# Patient Record
Sex: MALE | Race: WHITE | NOT HISPANIC OR LATINO | Employment: FULL TIME | URBAN - METROPOLITAN AREA
[De-identification: names, ages, dates, MRNs, and addresses within clinical notes are randomized per-mention and may not be internally consistent; named-entity substitution may affect disease eponyms.]

---

## 2021-03-20 PROBLEM — K70.11 ALCOHOLIC HEPATITIS WITH ASCITES: Status: ACTIVE | Noted: 2021-03-20

## 2021-03-20 PROBLEM — K76.6 PORTAL VENOUS HYPERTENSION (HCC): Status: ACTIVE | Noted: 2021-03-20

## 2021-06-24 PROBLEM — E87.1 HYPONATREMIA: Status: ACTIVE | Noted: 2021-06-01

## 2023-07-12 ENCOUNTER — APPOINTMENT (OUTPATIENT)
Dept: RADIOLOGY | Facility: MEDICAL CENTER | Age: 33
DRG: 854 | End: 2023-07-12
Attending: EMERGENCY MEDICINE

## 2023-07-12 ENCOUNTER — ANESTHESIA EVENT (OUTPATIENT)
Dept: SURGERY | Facility: MEDICAL CENTER | Age: 33
DRG: 854 | End: 2023-07-12

## 2023-07-12 ENCOUNTER — HOSPITAL ENCOUNTER (INPATIENT)
Facility: MEDICAL CENTER | Age: 33
LOS: 7 days | DRG: 854 | End: 2023-07-19
Attending: EMERGENCY MEDICINE | Admitting: HOSPITALIST
Payer: SELF-PAY

## 2023-07-12 ENCOUNTER — ANESTHESIA (OUTPATIENT)
Dept: SURGERY | Facility: MEDICAL CENTER | Age: 33
DRG: 854 | End: 2023-07-12

## 2023-07-12 DIAGNOSIS — R78.81 BACTEREMIA: ICD-10-CM

## 2023-07-12 DIAGNOSIS — E11.69 CONTROLLED TYPE 2 DIABETES MELLITUS WITH OTHER SPECIFIED COMPLICATION, UNSPECIFIED WHETHER LONG TERM INSULIN USE (HCC): ICD-10-CM

## 2023-07-12 DIAGNOSIS — L02.612 ABSCESS OF LEFT FOOT: ICD-10-CM

## 2023-07-12 PROBLEM — E11.9 DIABETES MELLITUS TYPE II, CONTROLLED (HCC): Status: ACTIVE | Noted: 2023-07-12

## 2023-07-12 PROBLEM — D64.9 NORMOCHROMIC NORMOCYTIC ANEMIA: Status: ACTIVE | Noted: 2023-07-12

## 2023-07-12 PROBLEM — E87.20 METABOLIC ACIDOSIS: Status: ACTIVE | Noted: 2023-07-12

## 2023-07-12 LAB
ALBUMIN SERPL BCP-MCNC: 4 G/DL (ref 3.2–4.9)
ALBUMIN/GLOB SERPL: 1.3 G/DL
ALP SERPL-CCNC: 197 U/L (ref 30–99)
ALT SERPL-CCNC: 14 U/L (ref 2–50)
ANION GAP SERPL CALC-SCNC: 18 MMOL/L (ref 7–16)
APPEARANCE UR: ABNORMAL
AST SERPL-CCNC: 18 U/L (ref 12–45)
BACTERIA #/AREA URNS HPF: ABNORMAL /HPF
BASOPHILS # BLD AUTO: 0.3 % (ref 0–1.8)
BASOPHILS # BLD: 0.03 K/UL (ref 0–0.12)
BILIRUB SERPL-MCNC: 2 MG/DL (ref 0.1–1.5)
BILIRUB UR QL STRIP.AUTO: ABNORMAL
BUN SERPL-MCNC: 10 MG/DL (ref 8–22)
CALCIUM ALBUM COR SERPL-MCNC: 8.8 MG/DL (ref 8.5–10.5)
CALCIUM SERPL-MCNC: 8.8 MG/DL (ref 8.4–10.2)
CHLORIDE SERPL-SCNC: 84 MMOL/L (ref 96–112)
CO2 SERPL-SCNC: 21 MMOL/L (ref 20–33)
COLOR UR: YELLOW
CREAT SERPL-MCNC: 0.94 MG/DL (ref 0.5–1.4)
EOSINOPHIL # BLD AUTO: 0 K/UL (ref 0–0.51)
EOSINOPHIL NFR BLD: 0 % (ref 0–6.9)
EPI CELLS #/AREA URNS HPF: ABNORMAL /HPF
ERYTHROCYTE [DISTWIDTH] IN BLOOD BY AUTOMATED COUNT: 38.4 FL (ref 35.9–50)
ERYTHROCYTE [SEDIMENTATION RATE] IN BLOOD BY WESTERGREN METHOD: 30 MM/HOUR (ref 0–20)
GFR SERPLBLD CREATININE-BSD FMLA CKD-EPI: 110 ML/MIN/1.73 M 2
GLOBULIN SER CALC-MCNC: 3 G/DL (ref 1.9–3.5)
GLUCOSE BLD STRIP.AUTO-MCNC: 188 MG/DL (ref 65–99)
GLUCOSE SERPL-MCNC: 209 MG/DL (ref 65–99)
GLUCOSE UR STRIP.AUTO-MCNC: 100 MG/DL
HCT VFR BLD AUTO: 39 % (ref 42–52)
HGB BLD-MCNC: 13.4 G/DL (ref 14–18)
HGB RETIC QN AUTO: 27.9 PG/CELL (ref 29–35)
IMM GRANULOCYTES # BLD AUTO: 0.07 K/UL (ref 0–0.11)
IMM GRANULOCYTES NFR BLD AUTO: 0.6 % (ref 0–0.9)
IMM RETICS NFR: 6.9 % (ref 2.6–16.1)
INR PPP: 1.48 (ref 0.87–1.13)
IRON SATN MFR SERPL: 5 % (ref 15–55)
IRON SERPL-MCNC: 12 UG/DL (ref 50–180)
KETONES UR STRIP.AUTO-MCNC: NEGATIVE MG/DL
LACTATE SERPL-SCNC: 1.6 MMOL/L (ref 0.5–2)
LACTATE SERPL-SCNC: 2.3 MMOL/L (ref 0.5–2)
LEUKOCYTE ESTERASE UR QL STRIP.AUTO: NEGATIVE
LYMPHOCYTES # BLD AUTO: 0.33 K/UL (ref 1–4.8)
LYMPHOCYTES NFR BLD: 3.1 % (ref 22–41)
MCH RBC QN AUTO: 28.6 PG (ref 27–33)
MCHC RBC AUTO-ENTMCNC: 34.4 G/DL (ref 32.3–36.5)
MCV RBC AUTO: 83.3 FL (ref 81.4–97.8)
MICRO URNS: ABNORMAL
MONOCYTES # BLD AUTO: 0.65 K/UL (ref 0–0.85)
MONOCYTES NFR BLD AUTO: 6 % (ref 0–13.4)
MUCOUS THREADS #/AREA URNS HPF: ABNORMAL /HPF
NEUTROPHILS # BLD AUTO: 9.73 K/UL (ref 1.82–7.42)
NEUTROPHILS NFR BLD: 90 % (ref 44–72)
NITRITE UR QL STRIP.AUTO: POSITIVE
NRBC # BLD AUTO: 0 K/UL
NRBC BLD-RTO: 0 /100 WBC (ref 0–0.2)
PH UR STRIP.AUTO: 5.5 [PH] (ref 5–8)
PLATELET # BLD AUTO: 94 K/UL (ref 164–446)
PLATELET BLD QL SMEAR: NORMAL
PMV BLD AUTO: 11.9 FL (ref 9–12.9)
POTASSIUM SERPL-SCNC: 3.9 MMOL/L (ref 3.6–5.5)
PROCALCITONIN SERPL-MCNC: 0.76 NG/ML
PROCALCITONIN SERPL-MCNC: 0.77 NG/ML
PROT SERPL-MCNC: 7 G/DL (ref 6–8.2)
PROT UR QL STRIP: 100 MG/DL
PROTHROMBIN TIME: 18.5 SEC (ref 12–14.6)
RBC # BLD AUTO: 4.68 M/UL (ref 4.7–6.1)
RBC # URNS HPF: ABNORMAL /HPF
RBC BLD AUTO: NORMAL
RBC UR QL AUTO: ABNORMAL
RETICS # AUTO: 0.06 M/UL (ref 0.04–0.12)
RETICS/RBC NFR: 1.4 % (ref 0.8–2.6)
SODIUM SERPL-SCNC: 123 MMOL/L (ref 135–145)
SP GR UR STRIP.AUTO: 1.02
TIBC SERPL-MCNC: 250 UG/DL (ref 250–450)
TROPONIN T SERPL-MCNC: 19 NG/L (ref 6–19)
UIBC SERPL-MCNC: 238 UG/DL (ref 110–370)
WBC # BLD AUTO: 10.8 K/UL (ref 4.8–10.8)
WBC #/AREA URNS HPF: ABNORMAL /HPF

## 2023-07-12 PROCEDURE — 700101 HCHG RX REV CODE 250: Performed by: EMERGENCY MEDICINE

## 2023-07-12 PROCEDURE — 99291 CRITICAL CARE FIRST HOUR: CPT

## 2023-07-12 PROCEDURE — 96367 TX/PROPH/DG ADDL SEQ IV INF: CPT

## 2023-07-12 PROCEDURE — 87181 SC STD AGAR DILUTION PER AGT: CPT

## 2023-07-12 PROCEDURE — 84145 PROCALCITONIN (PCT): CPT

## 2023-07-12 PROCEDURE — 73610 X-RAY EXAM OF ANKLE: CPT | Mod: LT

## 2023-07-12 PROCEDURE — 82607 VITAMIN B-12: CPT

## 2023-07-12 PROCEDURE — 99223 1ST HOSP IP/OBS HIGH 75: CPT | Performed by: HOSPITALIST

## 2023-07-12 PROCEDURE — 80053 COMPREHEN METABOLIC PANEL: CPT

## 2023-07-12 PROCEDURE — 99222 1ST HOSP IP/OBS MODERATE 55: CPT | Mod: 25 | Performed by: ORTHOPAEDIC SURGERY

## 2023-07-12 PROCEDURE — 85610 PROTHROMBIN TIME: CPT

## 2023-07-12 PROCEDURE — 28003 TREATMENT OF FOOT INFECTION: CPT | Mod: LT | Performed by: ORTHOPAEDIC SURGERY

## 2023-07-12 PROCEDURE — 700111 HCHG RX REV CODE 636 W/ 250 OVERRIDE (IP): Mod: JZ | Performed by: STUDENT IN AN ORGANIZED HEALTH CARE EDUCATION/TRAINING PROGRAM

## 2023-07-12 PROCEDURE — 700102 HCHG RX REV CODE 250 W/ 637 OVERRIDE(OP): Performed by: EMERGENCY MEDICINE

## 2023-07-12 PROCEDURE — 160009 HCHG ANES TIME/MIN: Performed by: ORTHOPAEDIC SURGERY

## 2023-07-12 PROCEDURE — 160027 HCHG SURGERY MINUTES - 1ST 30 MINS LEVEL 2: Performed by: ORTHOPAEDIC SURGERY

## 2023-07-12 PROCEDURE — 87186 SC STD MICRODIL/AGAR DIL: CPT | Mod: 91

## 2023-07-12 PROCEDURE — 02HV33Z INSERTION OF INFUSION DEVICE INTO SUPERIOR VENA CAVA, PERCUTANEOUS APPROACH: ICD-10-PCS | Performed by: ORTHOPAEDIC SURGERY

## 2023-07-12 PROCEDURE — 82728 ASSAY OF FERRITIN: CPT

## 2023-07-12 PROCEDURE — A9270 NON-COVERED ITEM OR SERVICE: HCPCS | Performed by: HOSPITALIST

## 2023-07-12 PROCEDURE — 770001 HCHG ROOM/CARE - MED/SURG/GYN PRIV*

## 2023-07-12 PROCEDURE — A9270 NON-COVERED ITEM OR SERVICE: HCPCS | Performed by: EMERGENCY MEDICINE

## 2023-07-12 PROCEDURE — 160002 HCHG RECOVERY MINUTES (STAT): Performed by: ORTHOPAEDIC SURGERY

## 2023-07-12 PROCEDURE — 87076 CULTURE ANAEROBE IDENT EACH: CPT | Mod: 91

## 2023-07-12 PROCEDURE — 71045 X-RAY EXAM CHEST 1 VIEW: CPT

## 2023-07-12 PROCEDURE — 36415 COLL VENOUS BLD VENIPUNCTURE: CPT

## 2023-07-12 PROCEDURE — 99140 ANES COMP EMERGENCY COND: CPT | Performed by: STUDENT IN AN ORGANIZED HEALTH CARE EDUCATION/TRAINING PROGRAM

## 2023-07-12 PROCEDURE — 83605 ASSAY OF LACTIC ACID: CPT

## 2023-07-12 PROCEDURE — 82962 GLUCOSE BLOOD TEST: CPT

## 2023-07-12 PROCEDURE — 700105 HCHG RX REV CODE 258: Performed by: HOSPITALIST

## 2023-07-12 PROCEDURE — 96365 THER/PROPH/DIAG IV INF INIT: CPT

## 2023-07-12 PROCEDURE — 96375 TX/PRO/DX INJ NEW DRUG ADDON: CPT

## 2023-07-12 PROCEDURE — 83540 ASSAY OF IRON: CPT

## 2023-07-12 PROCEDURE — 700101 HCHG RX REV CODE 250: Performed by: STUDENT IN AN ORGANIZED HEALTH CARE EDUCATION/TRAINING PROGRAM

## 2023-07-12 PROCEDURE — 96366 THER/PROPH/DIAG IV INF ADDON: CPT

## 2023-07-12 PROCEDURE — 87147 CULTURE TYPE IMMUNOLOGIC: CPT

## 2023-07-12 PROCEDURE — 700111 HCHG RX REV CODE 636 W/ 250 OVERRIDE (IP): Performed by: EMERGENCY MEDICINE

## 2023-07-12 PROCEDURE — 700105 HCHG RX REV CODE 258: Mod: JZ | Performed by: ORTHOPAEDIC SURGERY

## 2023-07-12 PROCEDURE — 87102 FUNGUS ISOLATION CULTURE: CPT | Mod: 91

## 2023-07-12 PROCEDURE — 700102 HCHG RX REV CODE 250 W/ 637 OVERRIDE(OP): Performed by: HOSPITALIST

## 2023-07-12 PROCEDURE — 87040 BLOOD CULTURE FOR BACTERIA: CPT

## 2023-07-12 PROCEDURE — 85025 COMPLETE CBC W/AUTO DIFF WBC: CPT

## 2023-07-12 PROCEDURE — 700111 HCHG RX REV CODE 636 W/ 250 OVERRIDE (IP): Mod: JZ | Performed by: HOSPITALIST

## 2023-07-12 PROCEDURE — 01470 ANES PX NRV MSC LW L/A/F NOS: CPT | Performed by: STUDENT IN AN ORGANIZED HEALTH CARE EDUCATION/TRAINING PROGRAM

## 2023-07-12 PROCEDURE — 83550 IRON BINDING TEST: CPT

## 2023-07-12 PROCEDURE — 94760 N-INVAS EAR/PLS OXIMETRY 1: CPT

## 2023-07-12 PROCEDURE — 84484 ASSAY OF TROPONIN QUANT: CPT

## 2023-07-12 PROCEDURE — 81001 URINALYSIS AUTO W/SCOPE: CPT

## 2023-07-12 PROCEDURE — 87086 URINE CULTURE/COLONY COUNT: CPT

## 2023-07-12 PROCEDURE — 87070 CULTURE OTHR SPECIMN AEROBIC: CPT | Mod: 91

## 2023-07-12 PROCEDURE — 160035 HCHG PACU - 1ST 60 MINS PHASE I: Performed by: ORTHOPAEDIC SURGERY

## 2023-07-12 PROCEDURE — 0QDM0ZZ EXTRACTION OF LEFT TARSAL, OPEN APPROACH: ICD-10-PCS | Performed by: ORTHOPAEDIC SURGERY

## 2023-07-12 PROCEDURE — 0JDR0ZZ EXTRACTION OF LEFT FOOT SUBCUTANEOUS TISSUE AND FASCIA, OPEN APPROACH: ICD-10-PCS | Performed by: ORTHOPAEDIC SURGERY

## 2023-07-12 PROCEDURE — 85652 RBC SED RATE AUTOMATED: CPT

## 2023-07-12 PROCEDURE — 87077 CULTURE AEROBIC IDENTIFY: CPT | Mod: 91

## 2023-07-12 PROCEDURE — 700105 HCHG RX REV CODE 258: Performed by: EMERGENCY MEDICINE

## 2023-07-12 PROCEDURE — 87075 CULTR BACTERIA EXCEPT BLOOD: CPT

## 2023-07-12 PROCEDURE — 160048 HCHG OR STATISTICAL LEVEL 1-5: Performed by: ORTHOPAEDIC SURGERY

## 2023-07-12 PROCEDURE — 85046 RETICYTE/HGB CONCENTRATE: CPT

## 2023-07-12 PROCEDURE — 87205 SMEAR GRAM STAIN: CPT | Mod: 91

## 2023-07-12 PROCEDURE — 73630 X-RAY EXAM OF FOOT: CPT | Mod: LT

## 2023-07-12 RX ORDER — SODIUM CHLORIDE, SODIUM LACTATE, POTASSIUM CHLORIDE, CALCIUM CHLORIDE 600; 310; 30; 20 MG/100ML; MG/100ML; MG/100ML; MG/100ML
INJECTION, SOLUTION INTRAVENOUS CONTINUOUS
Status: ACTIVE | OUTPATIENT
Start: 2023-07-12 | End: 2023-07-12

## 2023-07-12 RX ORDER — PROMETHAZINE HYDROCHLORIDE 25 MG/1
12.5-25 TABLET ORAL EVERY 4 HOURS PRN
Status: DISCONTINUED | OUTPATIENT
Start: 2023-07-12 | End: 2023-07-19 | Stop reason: HOSPADM

## 2023-07-12 RX ORDER — DEXAMETHASONE SODIUM PHOSPHATE 4 MG/ML
INJECTION, SOLUTION INTRA-ARTICULAR; INTRALESIONAL; INTRAMUSCULAR; INTRAVENOUS; SOFT TISSUE PRN
Status: DISCONTINUED | OUTPATIENT
Start: 2023-07-12 | End: 2023-07-12 | Stop reason: SURG

## 2023-07-12 RX ORDER — HALOPERIDOL 5 MG/ML
1 INJECTION INTRAMUSCULAR
Status: DISCONTINUED | OUTPATIENT
Start: 2023-07-12 | End: 2023-07-12 | Stop reason: HOSPADM

## 2023-07-12 RX ORDER — POLYETHYLENE GLYCOL 3350 17 G/17G
1 POWDER, FOR SOLUTION ORAL
Status: DISCONTINUED | OUTPATIENT
Start: 2023-07-12 | End: 2023-07-19 | Stop reason: HOSPADM

## 2023-07-12 RX ORDER — MORPHINE SULFATE 4 MG/ML
2 INJECTION INTRAVENOUS
Status: DISCONTINUED | OUTPATIENT
Start: 2023-07-12 | End: 2023-07-19 | Stop reason: HOSPADM

## 2023-07-12 RX ORDER — SODIUM CHLORIDE, SODIUM LACTATE, POTASSIUM CHLORIDE, AND CALCIUM CHLORIDE .6; .31; .03; .02 G/100ML; G/100ML; G/100ML; G/100ML
500 INJECTION, SOLUTION INTRAVENOUS
Status: DISCONTINUED | OUTPATIENT
Start: 2023-07-12 | End: 2023-07-19 | Stop reason: HOSPADM

## 2023-07-12 RX ORDER — SODIUM CHLORIDE, SODIUM LACTATE, POTASSIUM CHLORIDE, CALCIUM CHLORIDE 600; 310; 30; 20 MG/100ML; MG/100ML; MG/100ML; MG/100ML
INJECTION, SOLUTION INTRAVENOUS CONTINUOUS
Status: DISCONTINUED | OUTPATIENT
Start: 2023-07-12 | End: 2023-07-12

## 2023-07-12 RX ORDER — HYDROMORPHONE HYDROCHLORIDE 1 MG/ML
0.2 INJECTION, SOLUTION INTRAMUSCULAR; INTRAVENOUS; SUBCUTANEOUS
Status: DISCONTINUED | OUTPATIENT
Start: 2023-07-12 | End: 2023-07-12 | Stop reason: HOSPADM

## 2023-07-12 RX ORDER — EPHEDRINE SULFATE 50 MG/ML
5 INJECTION, SOLUTION INTRAVENOUS
Status: DISCONTINUED | OUTPATIENT
Start: 2023-07-12 | End: 2023-07-12 | Stop reason: HOSPADM

## 2023-07-12 RX ORDER — SODIUM CHLORIDE, SODIUM LACTATE, POTASSIUM CHLORIDE, CALCIUM CHLORIDE 600; 310; 30; 20 MG/100ML; MG/100ML; MG/100ML; MG/100ML
INJECTION, SOLUTION INTRAVENOUS CONTINUOUS
Status: DISCONTINUED | OUTPATIENT
Start: 2023-07-12 | End: 2023-07-12 | Stop reason: HOSPADM

## 2023-07-12 RX ORDER — ONDANSETRON 2 MG/ML
4 INJECTION INTRAMUSCULAR; INTRAVENOUS EVERY 4 HOURS PRN
Status: DISCONTINUED | OUTPATIENT
Start: 2023-07-12 | End: 2023-07-19 | Stop reason: HOSPADM

## 2023-07-12 RX ORDER — ONDANSETRON 2 MG/ML
4 INJECTION INTRAMUSCULAR; INTRAVENOUS
Status: DISCONTINUED | OUTPATIENT
Start: 2023-07-12 | End: 2023-07-12 | Stop reason: HOSPADM

## 2023-07-12 RX ORDER — OXYCODONE HCL 5 MG/5 ML
10 SOLUTION, ORAL ORAL
Status: DISCONTINUED | OUTPATIENT
Start: 2023-07-12 | End: 2023-07-12 | Stop reason: HOSPADM

## 2023-07-12 RX ORDER — LABETALOL HYDROCHLORIDE 5 MG/ML
10 INJECTION, SOLUTION INTRAVENOUS EVERY 4 HOURS PRN
Status: DISCONTINUED | OUTPATIENT
Start: 2023-07-12 | End: 2023-07-19 | Stop reason: HOSPADM

## 2023-07-12 RX ORDER — ACETAMINOPHEN 325 MG/1
650 TABLET ORAL EVERY 6 HOURS PRN
Status: DISCONTINUED | OUTPATIENT
Start: 2023-07-12 | End: 2023-07-19 | Stop reason: HOSPADM

## 2023-07-12 RX ORDER — PROCHLORPERAZINE EDISYLATE 5 MG/ML
5-10 INJECTION INTRAMUSCULAR; INTRAVENOUS EVERY 4 HOURS PRN
Status: DISCONTINUED | OUTPATIENT
Start: 2023-07-12 | End: 2023-07-19 | Stop reason: HOSPADM

## 2023-07-12 RX ORDER — DIPHENHYDRAMINE HYDROCHLORIDE 50 MG/ML
12.5 INJECTION INTRAMUSCULAR; INTRAVENOUS
Status: DISCONTINUED | OUTPATIENT
Start: 2023-07-12 | End: 2023-07-12 | Stop reason: HOSPADM

## 2023-07-12 RX ORDER — HYDROMORPHONE HYDROCHLORIDE 1 MG/ML
0.5 INJECTION, SOLUTION INTRAMUSCULAR; INTRAVENOUS; SUBCUTANEOUS
Status: DISCONTINUED | OUTPATIENT
Start: 2023-07-12 | End: 2023-07-12 | Stop reason: HOSPADM

## 2023-07-12 RX ORDER — HYDRALAZINE HYDROCHLORIDE 20 MG/ML
5 INJECTION INTRAMUSCULAR; INTRAVENOUS
Status: DISCONTINUED | OUTPATIENT
Start: 2023-07-12 | End: 2023-07-12 | Stop reason: HOSPADM

## 2023-07-12 RX ORDER — ONDANSETRON 2 MG/ML
4 INJECTION INTRAMUSCULAR; INTRAVENOUS ONCE
Status: COMPLETED | OUTPATIENT
Start: 2023-07-12 | End: 2023-07-12

## 2023-07-12 RX ORDER — OXYCODONE HYDROCHLORIDE 5 MG/1
2.5 TABLET ORAL
Status: DISCONTINUED | OUTPATIENT
Start: 2023-07-12 | End: 2023-07-19 | Stop reason: HOSPADM

## 2023-07-12 RX ORDER — MEPERIDINE HYDROCHLORIDE 25 MG/ML
6.25 INJECTION INTRAMUSCULAR; INTRAVENOUS; SUBCUTANEOUS
Status: DISCONTINUED | OUTPATIENT
Start: 2023-07-12 | End: 2023-07-12 | Stop reason: HOSPADM

## 2023-07-12 RX ORDER — SODIUM CHLORIDE, SODIUM LACTATE, POTASSIUM CHLORIDE, CALCIUM CHLORIDE 600; 310; 30; 20 MG/100ML; MG/100ML; MG/100ML; MG/100ML
1000 INJECTION, SOLUTION INTRAVENOUS ONCE
Status: COMPLETED | OUTPATIENT
Start: 2023-07-12 | End: 2023-07-12

## 2023-07-12 RX ORDER — MIDAZOLAM HYDROCHLORIDE 1 MG/ML
INJECTION INTRAMUSCULAR; INTRAVENOUS PRN
Status: DISCONTINUED | OUTPATIENT
Start: 2023-07-12 | End: 2023-07-12 | Stop reason: SURG

## 2023-07-12 RX ORDER — OXYCODONE HCL 5 MG/5 ML
5 SOLUTION, ORAL ORAL
Status: DISCONTINUED | OUTPATIENT
Start: 2023-07-12 | End: 2023-07-12 | Stop reason: HOSPADM

## 2023-07-12 RX ORDER — ONDANSETRON 2 MG/ML
INJECTION INTRAMUSCULAR; INTRAVENOUS PRN
Status: DISCONTINUED | OUTPATIENT
Start: 2023-07-12 | End: 2023-07-12 | Stop reason: SURG

## 2023-07-12 RX ORDER — ACETAMINOPHEN 325 MG/1
975 TABLET ORAL ONCE
Status: COMPLETED | OUTPATIENT
Start: 2023-07-12 | End: 2023-07-12

## 2023-07-12 RX ORDER — BISACODYL 10 MG
10 SUPPOSITORY, RECTAL RECTAL
Status: DISCONTINUED | OUTPATIENT
Start: 2023-07-12 | End: 2023-07-19 | Stop reason: HOSPADM

## 2023-07-12 RX ORDER — ONDANSETRON 4 MG/1
4 TABLET, ORALLY DISINTEGRATING ORAL EVERY 4 HOURS PRN
Status: DISCONTINUED | OUTPATIENT
Start: 2023-07-12 | End: 2023-07-19 | Stop reason: HOSPADM

## 2023-07-12 RX ORDER — OXYCODONE HYDROCHLORIDE 5 MG/1
5 TABLET ORAL
Status: DISCONTINUED | OUTPATIENT
Start: 2023-07-12 | End: 2023-07-19 | Stop reason: HOSPADM

## 2023-07-12 RX ORDER — CLINDAMYCIN PHOSPHATE 600 MG/50ML
600 INJECTION, SOLUTION INTRAVENOUS EVERY 8 HOURS
Status: DISCONTINUED | OUTPATIENT
Start: 2023-07-12 | End: 2023-07-13

## 2023-07-12 RX ORDER — HYDROMORPHONE HYDROCHLORIDE 1 MG/ML
0.1 INJECTION, SOLUTION INTRAMUSCULAR; INTRAVENOUS; SUBCUTANEOUS
Status: DISCONTINUED | OUTPATIENT
Start: 2023-07-12 | End: 2023-07-12 | Stop reason: HOSPADM

## 2023-07-12 RX ORDER — PROMETHAZINE HYDROCHLORIDE 25 MG/1
12.5-25 SUPPOSITORY RECTAL EVERY 4 HOURS PRN
Status: DISCONTINUED | OUTPATIENT
Start: 2023-07-12 | End: 2023-07-19 | Stop reason: HOSPADM

## 2023-07-12 RX ORDER — LIDOCAINE HYDROCHLORIDE 20 MG/ML
INJECTION, SOLUTION EPIDURAL; INFILTRATION; INTRACAUDAL; PERINEURAL PRN
Status: DISCONTINUED | OUTPATIENT
Start: 2023-07-12 | End: 2023-07-12 | Stop reason: SURG

## 2023-07-12 RX ORDER — AMOXICILLIN 250 MG
2 CAPSULE ORAL 2 TIMES DAILY
Status: DISCONTINUED | OUTPATIENT
Start: 2023-07-12 | End: 2023-07-19 | Stop reason: HOSPADM

## 2023-07-12 RX ADMIN — ACETAMINOPHEN 975 MG: 325 TABLET ORAL at 16:08

## 2023-07-12 RX ADMIN — AMPICILLIN AND SULBACTAM 3 G: 1; 2 INJECTION, POWDER, FOR SOLUTION INTRAMUSCULAR; INTRAVENOUS at 16:25

## 2023-07-12 RX ADMIN — ONDANSETRON 4 MG: 2 INJECTION INTRAMUSCULAR; INTRAVENOUS at 16:08

## 2023-07-12 RX ADMIN — PROPOFOL 150 MG: 10 INJECTION, EMULSION INTRAVENOUS at 19:30

## 2023-07-12 RX ADMIN — AMPICILLIN SODIUM AND SULBACTAM SODIUM 3 G: 2; 1 INJECTION, POWDER, FOR SOLUTION INTRAMUSCULAR; INTRAVENOUS at 23:57

## 2023-07-12 RX ADMIN — ACETAMINOPHEN 650 MG: 325 TABLET ORAL at 21:26

## 2023-07-12 RX ADMIN — VANCOMYCIN HYDROCHLORIDE 2250 MG: 500 INJECTION, POWDER, LYOPHILIZED, FOR SOLUTION INTRAVENOUS at 16:48

## 2023-07-12 RX ADMIN — INSULIN HUMAN 3 UNITS: 100 INJECTION, SOLUTION PARENTERAL at 21:15

## 2023-07-12 RX ADMIN — ONDANSETRON 4 MG: 2 INJECTION INTRAMUSCULAR; INTRAVENOUS at 19:51

## 2023-07-12 RX ADMIN — FENTANYL CITRATE 100 MCG: 50 INJECTION, SOLUTION INTRAMUSCULAR; INTRAVENOUS at 19:26

## 2023-07-12 RX ADMIN — SODIUM CHLORIDE, POTASSIUM CHLORIDE, SODIUM LACTATE AND CALCIUM CHLORIDE 1000 ML: 600; 310; 30; 20 INJECTION, SOLUTION INTRAVENOUS at 16:24

## 2023-07-12 RX ADMIN — CLINDAMYCIN PHOSPHATE 600 MG: 600 INJECTION, SOLUTION INTRAVENOUS at 21:22

## 2023-07-12 RX ADMIN — SODIUM CHLORIDE, POTASSIUM CHLORIDE, SODIUM LACTATE AND CALCIUM CHLORIDE: 600; 310; 30; 20 INJECTION, SOLUTION INTRAVENOUS at 19:26

## 2023-07-12 RX ADMIN — LIDOCAINE HYDROCHLORIDE 100 MG: 20 INJECTION, SOLUTION EPIDURAL; INFILTRATION; INTRACAUDAL at 19:30

## 2023-07-12 RX ADMIN — DEXAMETHASONE SODIUM PHOSPHATE 4 MG: 4 INJECTION INTRA-ARTICULAR; INTRALESIONAL; INTRAMUSCULAR; INTRAVENOUS; SOFT TISSUE at 19:30

## 2023-07-12 RX ADMIN — MIDAZOLAM 2 MG: 1 INJECTION, SOLUTION INTRAMUSCULAR; INTRAVENOUS at 19:26

## 2023-07-12 ASSESSMENT — ENCOUNTER SYMPTOMS
NAUSEA: 1
ABDOMINAL PAIN: 1
DIAPHORESIS: 0
HEADACHES: 1
BACK PAIN: 0
FLANK PAIN: 0
HEARTBURN: 0
CHILLS: 1
PSYCHIATRIC NEGATIVE: 1
BLOOD IN STOOL: 0
ORTHOPNEA: 0
STRIDOR: 0
CARDIOVASCULAR NEGATIVE: 1
SINUS PAIN: 0
SPUTUM PRODUCTION: 0
PND: 0
VOMITING: 1
TINGLING: 0
EYE DISCHARGE: 0
EYE REDNESS: 0
COUGH: 0
FALLS: 0
DOUBLE VISION: 0
WHEEZING: 0
SEIZURES: 0
CONSTIPATION: 1
BLURRED VISION: 1
PHOTOPHOBIA: 0
DEPRESSION: 0
DIZZINESS: 0
FOCAL WEAKNESS: 0
FEVER: 1
BRUISES/BLEEDS EASILY: 0
SHORTNESS OF BREATH: 0
WEIGHT LOSS: 1
MYALGIAS: 1
INSOMNIA: 0
POLYDIPSIA: 0
RESPIRATORY NEGATIVE: 1
SENSORY CHANGE: 0

## 2023-07-12 ASSESSMENT — COGNITIVE AND FUNCTIONAL STATUS - GENERAL
WALKING IN HOSPITAL ROOM: A LITTLE
DAILY ACTIVITIY SCORE: 24
MOBILITY SCORE: 22
SUGGESTED CMS G CODE MODIFIER DAILY ACTIVITY: CH
CLIMB 3 TO 5 STEPS WITH RAILING: A LITTLE
SUGGESTED CMS G CODE MODIFIER MOBILITY: CJ

## 2023-07-12 ASSESSMENT — LIFESTYLE VARIABLES
ALCOHOL_USE: NO
TOTAL SCORE: 0
HAVE YOU EVER FELT YOU SHOULD CUT DOWN ON YOUR DRINKING: NO
CONSUMPTION TOTAL: NEGATIVE
ON A TYPICAL DAY WHEN YOU DRINK ALCOHOL HOW MANY DRINKS DO YOU HAVE: 0
SUBSTANCE_ABUSE: 0
EVER HAD A DRINK FIRST THING IN THE MORNING TO STEADY YOUR NERVES TO GET RID OF A HANGOVER: NO
TOTAL SCORE: 0
TOTAL SCORE: 0
HAVE PEOPLE ANNOYED YOU BY CRITICIZING YOUR DRINKING: NO
AVERAGE NUMBER OF DAYS PER WEEK YOU HAVE A DRINK CONTAINING ALCOHOL: 0
HOW MANY TIMES IN THE PAST YEAR HAVE YOU HAD 5 OR MORE DRINKS IN A DAY: 0
EVER FELT BAD OR GUILTY ABOUT YOUR DRINKING: NO

## 2023-07-12 ASSESSMENT — FIBROSIS 4 INDEX
FIB4 SCORE: 2.19
FIB4 SCORE: 1.69

## 2023-07-12 ASSESSMENT — PATIENT HEALTH QUESTIONNAIRE - PHQ9
SUM OF ALL RESPONSES TO PHQ9 QUESTIONS 1 AND 2: 0
2. FEELING DOWN, DEPRESSED, IRRITABLE, OR HOPELESS: NOT AT ALL
1. LITTLE INTEREST OR PLEASURE IN DOING THINGS: NOT AT ALL

## 2023-07-12 ASSESSMENT — PAIN SCALES - GENERAL: PAIN_LEVEL: 2

## 2023-07-12 ASSESSMENT — PAIN DESCRIPTION - PAIN TYPE
TYPE: ACUTE PAIN;SURGICAL PAIN
TYPE: ACUTE PAIN

## 2023-07-12 NOTE — ED TRIAGE NOTES
Chief Complaint   Patient presents with    Wound Infection     Ulcer on the Left ball of foot for many months. Became worse 2 days ago.    Abdominal Pain     He feels the abd pain stems from the inability to eat.

## 2023-07-12 NOTE — ED NOTES
Pt stated that he does not want to stay over night, stated that he is worried about losing his job, ERP notified;

## 2023-07-12 NOTE — ED PROVIDER NOTES
ED Provider Note    CHIEF COMPLAINT  Chief Complaint   Patient presents with    Wound Infection     Ulcer on the Left ball of foot for many months. Became worse 2 days ago.    Abdominal Pain     He feels the abd pain stems from the inability to eat.       EXTERNAL RECORDS REVIEWED  Reviewed outpatient clinic visits previous work-ups for underlying liver disease laboratory studies    HPI/ROS  LIMITATION TO HISTORY   None  OUTSIDE HISTORIAN(S):  Father provided additional history     Esequiel Molina is a 33 y.o. male who presents evaluation of reported fever chills and profound increased pain and swelling in the left foot.  The patient has a notable history of underlying liver disease.  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  He had also been previously a diabetic due to his underlying liver disease but was able to clinically improve and is not on any medications currently.  He has been dealing with an open wound on the underside of his left foot for several months.  It had cultured out Staph aureus he had been on antibiotics but is currently not on antibiotics.  Over the past 3 days he reported elevated heart rate fever chills and profound increased swelling and now purulent drainage in the underside of the left foot.  Due to some underlying neuropathy he does not have great sensation in his extremities especially his feet.  He is accompanied by his father.    PAST MEDICAL HISTORY   has a past medical history of Anxiety, Depression, Diabetes (HCC), GERD (gastroesophageal reflux disease), History of chickenpox, Indigestion, Jaundice, Liver disease, and Urinary incontinence.    SURGICAL HISTORY  patient denies any surgical history  No major surgery  FAMILY HISTORY  No family history on file.  Noncontributory  SOCIAL HISTORY  Social History     Tobacco Use    Smoking status: Former     Types: Cigarettes     "Smokeless tobacco: Current     Types: Chew   Vaping Use    Vaping Use: Former    Start date: 1/11/2017    Quit date: 1/11/2020    Substances: Nicotine, THC    Devices: Pre-filled pod   Substance and Sexual Activity    Alcohol use: Not Currently    Drug use: Yes     Types: Marijuana, Inhaled, Oral     Comment: marijuana    Sexual activity: Not Currently     He has history of extensive alcohol use currently abstinent  CURRENT MEDICATIONS  Home Medications       Reviewed by Pebbles Briggs R.N. (Registered Nurse) on 07/12/23 at 1528  Med List Status: Not Addressed     Medication Last Dose Status   multivitamin Tab  Active   omeprazole (PRILOSEC) 20 MG delayed-release capsule  Active   sulfamethoxazole-trimethoprim (BACTRIM DS) 800-160 MG tablet  Active                    ALLERGIES  No Known Allergies    PHYSICAL EXAM  VITAL SIGNS: /85   Pulse (!) 103   Temp (!) 39.1 °C (102.3 °F) (Temporal)   Resp 16   Ht 1.956 m (6' 5\")   Wt 94.4 kg (208 lb 1.8 oz)   SpO2 98%   BMI 24.68 kg/m²    Pulse ox interpretation: I interpret this pulse ox as normal.  Constitutional: Alert and oriented x 3, patient appears ill tachycardic s  HEENT: Atraumatic normocephalic, pupils are equal round reactive to light extraocular movements are intact. The nares is clear, external ears are normal, mouth shows moist mucous membranes normal dentition for age  Neck: Supple, no JVD no tracheal deviation  Cardiovascular: Tachycardic no murmur rub or gallop 2+ pulses peripherally x4  Thorax & Lungs: No respiratory distress, no wheezes rales or rhonchi, No chest tenderness.   GI: Soft nontender nondistended positive bowel sounds, no peritoneal signs  Skin: Warm dry no acute rash or lesion  Musculoskeletal: Left lower extremity is notable for profound erythema soft tissue swelling to the left lower extremity particular in the dorsal aspect of the foot with a large 2 x 2 centimeter draining ulcer on the underside of the left " foot.          Neurologic: Cranial nerves III through XII are grossly intact no sensory deficit no cerebellar dysfunction   Psychiatric: Appropriate affect for situation at this time          DIAGNOSTIC STUDIES / PROCEDURES    LABS  Results for orders placed or performed during the hospital encounter of 07/12/23   Lactic acid (lactate)   Result Value Ref Range    Lactic Acid 2.3 (H) 0.5 - 2.0 mmol/L   CBC With Differential   Result Value Ref Range    WBC 10.8 4.8 - 10.8 K/uL    RBC 4.68 (L) 4.70 - 6.10 M/uL    Hemoglobin 13.4 (L) 14.0 - 18.0 g/dL    Hematocrit 39.0 (L) 42.0 - 52.0 %    MCV 83.3 81.4 - 97.8 fL    MCH 28.6 27.0 - 33.0 pg    MCHC 34.4 32.3 - 36.5 g/dL    RDW 38.4 35.9 - 50.0 fL    Platelet Count 94 (L) 164 - 446 K/uL    MPV 11.9 9.0 - 12.9 fL    Neutrophils-Polys 90.00 (H) 44.00 - 72.00 %    Lymphocytes 3.10 (L) 22.00 - 41.00 %    Monocytes 6.00 0.00 - 13.40 %    Eosinophils 0.00 0.00 - 6.90 %    Basophils 0.30 0.00 - 1.80 %    Immature Granulocytes 0.60 0.00 - 0.90 %    Nucleated RBC 0.00 0.00 - 0.20 /100 WBC    Neutrophils (Absolute) 9.73 (H) 1.82 - 7.42 K/uL    Lymphs (Absolute) 0.33 (L) 1.00 - 4.80 K/uL    Monos (Absolute) 0.65 0.00 - 0.85 K/uL    Eos (Absolute) 0.00 0.00 - 0.51 K/uL    Baso (Absolute) 0.03 0.00 - 0.12 K/uL    Immature Granulocytes (abs) 0.07 0.00 - 0.11 K/uL    NRBC (Absolute) 0.00 K/uL   Comp Metabolic Panel   Result Value Ref Range    Sodium 123 (L) 135 - 145 mmol/L    Potassium 3.9 3.6 - 5.5 mmol/L    Chloride 84 (L) 96 - 112 mmol/L    Co2 21 20 - 33 mmol/L    Anion Gap 18.0 (H) 7.0 - 16.0    Glucose 209 (H) 65 - 99 mg/dL    Bun 10 8 - 22 mg/dL    Creatinine 0.94 0.50 - 1.40 mg/dL    Calcium 8.8 8.4 - 10.2 mg/dL    AST(SGOT) 18 12 - 45 U/L    ALT(SGPT) 14 2 - 50 U/L    Alkaline Phosphatase 197 (H) 30 - 99 U/L    Total Bilirubin 2.0 (H) 0.1 - 1.5 mg/dL    Albumin 4.0 3.2 - 4.9 g/dL    Total Protein 7.0 6.0 - 8.2 g/dL    Globulin 3.0 1.9 - 3.5 g/dL    A-G Ratio 1.3 g/dL    Urinalysis    Specimen: Blood   Result Value Ref Range    Color Yellow     Character Hazy (A)     Specific Gravity 1.025 <1.035    Ph 5.5 5.0 - 8.0    Glucose 100 (A) Negative mg/dL    Ketones Negative Negative mg/dL    Protein 100 (A) Negative mg/dL    Bilirubin Moderate (A) Negative    Nitrite Positive (A) Negative    Leukocyte Esterase Negative Negative    Occult Blood Trace (A) Negative    Micro Urine Req Microscopic    PROCALCITONIN   Result Value Ref Range    Procalcitonin 0.76 (H) <0.25 ng/mL   Sed Rate   Result Value Ref Range    Sed Rate Westergren 30 (H) 0 - 20 mm/hour   URINE MICROSCOPIC (W/UA)   Result Value Ref Range    WBC 0-2 (A) /hpf    RBC Rare /hpf    Bacteria Rare (A) None /hpf    Epithelial Cells Rare Few /hpf    Mucous Threads Few /hpf   CORRECTED CALCIUM   Result Value Ref Range    Correct Calcium 8.8 8.5 - 10.5 mg/dL   ESTIMATED GFR   Result Value Ref Range    GFR (CKD-EPI) 110 >60 mL/min/1.73 m 2   PLATELET ESTIMATE   Result Value Ref Range    Plt Estimation Decreased    MORPHOLOGY   Result Value Ref Range    RBC Morphology Normal    Lactic Acid -STAT Once   Result Value Ref Range    Lactic Acid 1.6 0.5 - 2.0 mmol/L   Prothrombin time (INR)   Result Value Ref Range    PT 18.5 (H) 12.0 - 14.6 sec    INR 1.48 (H) 0.87 - 1.13   Procalcitonin   Result Value Ref Range    Procalcitonin 0.77 (H) <0.25 ng/mL   Troponin   Result Value Ref Range    Troponin T 19 6 - 19 ng/L   RETICULOCYTES COUNT   Result Value Ref Range    Reticulocyte Count 1.4 0.8 - 2.6 %    Retic, Absolute 0.06 0.04 - 0.12 M/uL    Imm. Reticulocyte Fraction 6.9 2.6 - 16.1 %    Retic Hgb Equivalent 27.9 (L) 29.0 - 35.0 pg/cell   AFB Culture    Specimen: Wound   Result Value Ref Range    Significant Indicator NEG     Source WND     Site left foot     Culture Result -     AFB Smear Results -    Fungal Culture    Specimen: Wound   Result Value Ref Range    Significant Indicator NEG     Source WND     Site left foot     Culture Result  -     Fungal Smear Results -    Anaerobic Culture    Specimen: Wound   Result Value Ref Range    Significant Indicator NEG     Source WND     Site left foot     Culture Result -    AFB Culture    Specimen: Wound   Result Value Ref Range    Significant Indicator NEG     Source WND     Site left foot #2     Culture Result -     AFB Smear Results -    Fungal Culture    Specimen: Wound   Result Value Ref Range    Significant Indicator NEG     Source WND     Site left foot #2     Culture Result -     Fungal Smear Results -    Anaerobic Culture    Specimen: Wound   Result Value Ref Range    Significant Indicator NEG     Source WND     Site left foot #2     Culture Result -    POCT glucose device results   Result Value Ref Range    POC Glucose, Blood 188 (H) 65 - 99 mg/dL        RADIOLOGY  I have independently interpreted the diagnostic imaging associated with this visit and am waiting the final reading from the radiologist.   My preliminary interpretation is as follows: Soft tissue swelling with small amount of gas in the first metatarsal  Radiologist interpretation:   DX-ANKLE 3+ VIEWS LEFT   Final Result      1.  There is diffuse swelling of the left ankle and hindfoot with no underlying osteomyelitis.      DX-FOOT-COMPLETE 3+ LEFT   Final Result      1.  There is diffuse swelling of the left foot predominantly adjacent to the 1st metatarsal with soft tissue air lucencies suggestive of cellulitis or potentially abscess.   2.  No radiographic evidence of osteomyelitis.      DX-CHEST-PORTABLE (1 VIEW)   Final Result      1.  There is no acute cardiopulmonary process.      MR-FOOT-W/O LEFT    (Results Pending)       COURSE & MEDICAL DECISION MAKING    ED Observation Status? No; Patient does not meet criteria for ED Observation.     INITIAL ASSESSMENT, COURSE AND PLAN  Care Narrative:     This is a very pleasant 33-year-old gentleman who presents here profoundly ill.  He has high fever tachycardia with evidence of a severe  limb threatening infection in the left lower extremity.  I immediately activated septic protocol and ordered blood cultures IV fluids as well as antibiotics.  Radiograph demonstrates significant soft tissue swelling with a small amount of soft tissue gas.  Emergent consultation with orthopedic surgery was obtained with Dr. Ponce as well as Dr. Knight with the hospitalist service.  The patient was given parenteral vancomycin, Unasyn as well as clindamycin.  There is significant concern about limb threatening infection possible even necrotizing fasciitis.  We considered performing an MRI but after just further discussion with orthopedics the plan was to go straight to the operating room after brief resuscitative efforts here in the emergency department.  The patient will be admitted to the hospitalist service as well as to the operating room with orthopedics.  He is in critical condition with a life and limb threatening condition.      HYDRATION: Based on the patient's presentation of Sepsis the patient was given IV fluids. IV Hydration was used because oral hydration was not adequate alone. Upon recheck following hydration, the patient was not improved.      ADDITIONAL PROBLEM LIST    DISPOSITION AND DISCUSSIONS  I have discussed management of the patient with the following physicians and REMY's: Discussed with orthopedic attending as well as hospitalist    Discussion of management with other QHP or appropriate source(s): Discussed with ER pharmacist regarding antibiotic therapy    Escalation of care considered, and ultimately not performed: Not applicable    Barriers to care at this time, including but not limited to: Not applicable    Decision tools and prescription drugs considered including, but not limited to: Not applicable    FINAL DIAGNOSIS  Possible left foot necrotizing fasciitis  Left lower extremity cellulitis  Sepsis    CRITICAL CARE  The very real possibilty of a deterioration of this patient's condition  required the highest level of my preparedness for sudden, emergent intervention.  I provided critical care services, which included medication orders, frequent reevaluations of the patient's condition and response to treatment, ordering and reviewing test results, and discussing the case with various consultants.  The critical care time associated with the care of the patient was 45 minutes. Review chart for interventions. This time is exclusive of any other billable procedures.          Electronically signed by: Werner Jones M.D., 7/12/2023 4:07 PM

## 2023-07-13 ENCOUNTER — APPOINTMENT (OUTPATIENT)
Dept: RADIOLOGY | Facility: MEDICAL CENTER | Age: 33
DRG: 854 | End: 2023-07-13
Attending: INTERNAL MEDICINE

## 2023-07-13 PROBLEM — M86.172 ACUTE OSTEOMYELITIS OF LEFT FOOT (HCC): Status: ACTIVE | Noted: 2023-07-13

## 2023-07-13 LAB
ANION GAP SERPL CALC-SCNC: 15 MMOL/L (ref 7–16)
BUN SERPL-MCNC: 13 MG/DL (ref 8–22)
CALCIUM SERPL-MCNC: 7.9 MG/DL (ref 8.4–10.2)
CHLORIDE SERPL-SCNC: 88 MMOL/L (ref 96–112)
CO2 SERPL-SCNC: 20 MMOL/L (ref 20–33)
CREAT SERPL-MCNC: 0.93 MG/DL (ref 0.5–1.4)
ERYTHROCYTE [DISTWIDTH] IN BLOOD BY AUTOMATED COUNT: 39.1 FL (ref 35.9–50)
EST. AVERAGE GLUCOSE BLD GHB EST-MCNC: 120 MG/DL
FERRITIN SERPL-MCNC: 406 NG/ML (ref 22–322)
FUNGUS SPEC FUNGUS STN: NORMAL
FUNGUS SPEC FUNGUS STN: NORMAL
GFR SERPLBLD CREATININE-BSD FMLA CKD-EPI: 111 ML/MIN/1.73 M 2
GLUCOSE BLD STRIP.AUTO-MCNC: 155 MG/DL (ref 65–99)
GLUCOSE BLD STRIP.AUTO-MCNC: 216 MG/DL (ref 65–99)
GLUCOSE BLD STRIP.AUTO-MCNC: 224 MG/DL (ref 65–99)
GLUCOSE BLD STRIP.AUTO-MCNC: 278 MG/DL (ref 65–99)
GLUCOSE SERPL-MCNC: 363 MG/DL (ref 65–99)
GRAM STN SPEC: ABNORMAL
GRAM STN SPEC: NORMAL
GRAM STN SPEC: NORMAL
HBA1C MFR BLD: 5.8 % (ref 4–5.6)
HCT VFR BLD AUTO: 31.9 % (ref 42–52)
HGB BLD-MCNC: 10.9 G/DL (ref 14–18)
LACTATE SERPL-SCNC: 3.1 MMOL/L (ref 0.5–2)
LACTATE SERPL-SCNC: 3.4 MMOL/L (ref 0.5–2)
MCH RBC QN AUTO: 29 PG (ref 27–33)
MCHC RBC AUTO-ENTMCNC: 34.2 G/DL (ref 32.3–36.5)
MCV RBC AUTO: 84.8 FL (ref 81.4–97.8)
PLATELET # BLD AUTO: 79 K/UL (ref 164–446)
PLATELETS.RETICULATED NFR BLD AUTO: 13.2 % (ref 0.6–13.1)
PMV BLD AUTO: 12.6 FL (ref 9–12.9)
POTASSIUM SERPL-SCNC: 3.8 MMOL/L (ref 3.6–5.5)
RBC # BLD AUTO: 3.76 M/UL (ref 4.7–6.1)
SCCMEC + MECA PNL NOSE NAA+PROBE: NEGATIVE
SIGNIFICANT IND 70042: ABNORMAL
SIGNIFICANT IND 70042: NORMAL
SITE SITE: ABNORMAL
SITE SITE: NORMAL
SODIUM SERPL-SCNC: 123 MMOL/L (ref 135–145)
SOURCE SOURCE: ABNORMAL
SOURCE SOURCE: NORMAL
VIT B12 SERPL-MCNC: 1464 PG/ML (ref 211–911)
WBC # BLD AUTO: 9.3 K/UL (ref 4.8–10.8)

## 2023-07-13 PROCEDURE — 700102 HCHG RX REV CODE 250 W/ 637 OVERRIDE(OP): Performed by: INTERNAL MEDICINE

## 2023-07-13 PROCEDURE — 80048 BASIC METABOLIC PNL TOTAL CA: CPT

## 2023-07-13 PROCEDURE — 700101 HCHG RX REV CODE 250: Performed by: HOSPITALIST

## 2023-07-13 PROCEDURE — 94760 N-INVAS EAR/PLS OXIMETRY 1: CPT

## 2023-07-13 PROCEDURE — 73720 MRI LWR EXTREMITY W/O&W/DYE: CPT | Mod: LT

## 2023-07-13 PROCEDURE — 36415 COLL VENOUS BLD VENIPUNCTURE: CPT

## 2023-07-13 PROCEDURE — 700105 HCHG RX REV CODE 258: Performed by: HOSPITALIST

## 2023-07-13 PROCEDURE — 700111 HCHG RX REV CODE 636 W/ 250 OVERRIDE (IP): Mod: JZ | Performed by: HOSPITALIST

## 2023-07-13 PROCEDURE — 99024 POSTOP FOLLOW-UP VISIT: CPT | Performed by: ORTHOPAEDIC SURGERY

## 2023-07-13 PROCEDURE — 85027 COMPLETE CBC AUTOMATED: CPT

## 2023-07-13 PROCEDURE — 87641 MR-STAPH DNA AMP PROBE: CPT

## 2023-07-13 PROCEDURE — 700117 HCHG RX CONTRAST REV CODE 255: Performed by: INTERNAL MEDICINE

## 2023-07-13 PROCEDURE — 700105 HCHG RX REV CODE 258: Performed by: INTERNAL MEDICINE

## 2023-07-13 PROCEDURE — 99233 SBSQ HOSP IP/OBS HIGH 50: CPT | Performed by: INTERNAL MEDICINE

## 2023-07-13 PROCEDURE — 83036 HEMOGLOBIN GLYCOSYLATED A1C: CPT

## 2023-07-13 PROCEDURE — 85055 RETICULATED PLATELET ASSAY: CPT

## 2023-07-13 PROCEDURE — 82962 GLUCOSE BLOOD TEST: CPT | Mod: 91

## 2023-07-13 PROCEDURE — 770001 HCHG ROOM/CARE - MED/SURG/GYN PRIV*

## 2023-07-13 PROCEDURE — A9579 GAD-BASE MR CONTRAST NOS,1ML: HCPCS | Performed by: INTERNAL MEDICINE

## 2023-07-13 PROCEDURE — 83605 ASSAY OF LACTIC ACID: CPT | Mod: 91

## 2023-07-13 RX ORDER — SODIUM CHLORIDE 9 MG/ML
1000 INJECTION, SOLUTION INTRAVENOUS ONCE
Status: COMPLETED | OUTPATIENT
Start: 2023-07-13 | End: 2023-07-13

## 2023-07-13 RX ORDER — SODIUM CHLORIDE 9 MG/ML
INJECTION, SOLUTION INTRAVENOUS CONTINUOUS
Status: DISCONTINUED | OUTPATIENT
Start: 2023-07-13 | End: 2023-07-16

## 2023-07-13 RX ADMIN — SODIUM CHLORIDE 1000 ML: 9 INJECTION, SOLUTION INTRAVENOUS at 08:03

## 2023-07-13 RX ADMIN — INSULIN HUMAN 3 UNITS: 100 INJECTION, SOLUTION PARENTERAL at 17:33

## 2023-07-13 RX ADMIN — CLINDAMYCIN PHOSPHATE 600 MG: 600 INJECTION, SOLUTION INTRAVENOUS at 05:17

## 2023-07-13 RX ADMIN — AMPICILLIN SODIUM AND SULBACTAM SODIUM 3 G: 2; 1 INJECTION, POWDER, FOR SOLUTION INTRAMUSCULAR; INTRAVENOUS at 23:21

## 2023-07-13 RX ADMIN — VANCOMYCIN HYDROCHLORIDE 1250 MG: 1 INJECTION, POWDER, LYOPHILIZED, FOR SOLUTION INTRAVENOUS at 09:33

## 2023-07-13 RX ADMIN — INSULIN HUMAN 4 UNITS: 100 INJECTION, SOLUTION PARENTERAL at 12:09

## 2023-07-13 RX ADMIN — INSULIN HUMAN 7 UNITS: 100 INJECTION, SOLUTION PARENTERAL at 05:52

## 2023-07-13 RX ADMIN — SODIUM CHLORIDE: 9 INJECTION, SOLUTION INTRAVENOUS at 09:06

## 2023-07-13 RX ADMIN — INSULIN HUMAN 4 UNITS: 100 INJECTION, SOLUTION PARENTERAL at 21:07

## 2023-07-13 RX ADMIN — VANCOMYCIN HYDROCHLORIDE 1250 MG: 1 INJECTION, POWDER, LYOPHILIZED, FOR SOLUTION INTRAVENOUS at 17:40

## 2023-07-13 RX ADMIN — INSULIN GLARGINE-YFGN 6 UNITS: 100 INJECTION, SOLUTION SUBCUTANEOUS at 08:05

## 2023-07-13 RX ADMIN — SODIUM CHLORIDE 1000 ML: 9 INJECTION, SOLUTION INTRAVENOUS at 01:38

## 2023-07-13 RX ADMIN — VANCOMYCIN HYDROCHLORIDE 1250 MG: 1 INJECTION, POWDER, LYOPHILIZED, FOR SOLUTION INTRAVENOUS at 01:34

## 2023-07-13 RX ADMIN — AMPICILLIN SODIUM AND SULBACTAM SODIUM 3 G: 2; 1 INJECTION, POWDER, FOR SOLUTION INTRAMUSCULAR; INTRAVENOUS at 12:09

## 2023-07-13 RX ADMIN — AMPICILLIN SODIUM AND SULBACTAM SODIUM 3 G: 2; 1 INJECTION, POWDER, FOR SOLUTION INTRAMUSCULAR; INTRAVENOUS at 17:30

## 2023-07-13 RX ADMIN — GADOTERIDOL 20 ML: 279.3 INJECTION, SOLUTION INTRAVENOUS at 13:50

## 2023-07-13 RX ADMIN — AMPICILLIN SODIUM AND SULBACTAM SODIUM 3 G: 2; 1 INJECTION, POWDER, FOR SOLUTION INTRAMUSCULAR; INTRAVENOUS at 05:50

## 2023-07-13 ASSESSMENT — ENCOUNTER SYMPTOMS
FEVER: 0
COUGH: 0
DIZZINESS: 0
SORE THROAT: 0
FOCAL WEAKNESS: 0
NAUSEA: 0
MYALGIAS: 0
VOMITING: 0
ROS SKIN COMMENTS: WOUND
HEARTBURN: 0
WEAKNESS: 1
BLOOD IN STOOL: 0
CHILLS: 0
DEPRESSION: 0
HALLUCINATIONS: 0
ABDOMINAL PAIN: 0
DIARRHEA: 0
HEADACHES: 0
SHORTNESS OF BREATH: 0
BACK PAIN: 0
PALPITATIONS: 0

## 2023-07-13 ASSESSMENT — PAIN DESCRIPTION - PAIN TYPE
TYPE: SURGICAL PAIN
TYPE: SURGICAL PAIN

## 2023-07-13 NOTE — ANESTHESIA PREPROCEDURE EVALUATION
Case: [id number] Date/Time: 07/12/23 1845    Procedure: INCISION AND DRAINAGE foot (Left)    Location:  OR 02 / SURGERY [facility]    Surgeons: Michael Ponce M.D.          Relevant Problems   CARDIAC   (positive) Portal venous hypertension (HCC)      ENDO   (positive) Diabetes mellitus type II, controlled (HCC)      Other   (positive) Alcoholic hepatitis with ascites   (positive) Hyponatremia   (positive) Metabolic acidosis   (positive) Normochromic normocytic anemia   Septic from foot infection, with resultant acidosis and hyponatremia    Pt is an everyday marijuana smoker, has not drank in 2 years, smokes cigarettes occasionally, no other medical problems.     Physical Exam    Airway   Mallampati: II  TM distance: >3 FB  Neck ROM: full       Cardiovascular - normal exam  Rhythm: regular  Rate: normal  (-) murmur     Dental - normal exam           Pulmonary - normal exam  Breath sounds clear to auscultation     Abdominal    Neurological - normal exam                 Anesthesia Plan    ASA 4- EMERGENT   ASA physical status 4 criteria: sepsisASA physical status emergent criteria: sepsis    Plan - general       Airway plan will be LMA          Induction: intravenous    Postoperative Plan: Postoperative administration of opioids is intended.    Pertinent diagnostic labs and testing reviewed    Informed Consent:    Anesthetic plan and risks discussed with patient.    Use of blood products discussed with: patient whom consented to blood products.

## 2023-07-13 NOTE — H&P
Hospital Medicine History & Physical Note    Date of Service  7/12/2023    Primary Care Physician  Juvenal Lewis P.A.-C.    Consultants  orthopedics    Specialist Names: Dr. Michael Ponce    Code Status  Full Code    Chief Complaint  Chief Complaint   Patient presents with    Wound Infection     Ulcer on the Left ball of foot for many months. Became worse 2 days ago.    Abdominal Pain     He feels the abd pain stems from the inability to eat.       History of Presenting Illness  Esequiel Molina is a 33 y.o. male who presented 7/12/2023 with swelling and redness in his left foot.  Patient was known to be diabetic when he was drinking heavily but quit 2-1/2 years ago.  Unfortunately in spite of this the diabetes took some time to resolve and the patient developed a left sole abscess that was under the care of wound care.  Because of wound care the patient was able to really improve and he had wound care from November all the way until February.  At that point his insurance changed and he had issues with regarding regaining his insurance and so he no longer had wound care.  Over time his foot started breaking down and he developed a deep ulcer.  Over the past 2 days his foot became much worse and his great toe started swelling up with yellowish weeping.  The patient does came into the emergency room with pain 6 out of 10.  Fevers of 103-104 at home for the past 2 days.  The patient was evaluated imaging studies which shows an abscess formation with early necrotizing fasciitis with gas formation.  Patient at this point will be placed on clindamycin, vancomycin, Unasyn.  I have spoken with Dr. Michael Ponce of orthopedic surgery and he will be taken the patient to the OR tonight for incision and drainage.  Patient will be kept NPO.  We will continue pain management.  We will keep fluid resuscitation going.    I discussed the plan of care with patient, family, bedside RN, and emergency room physician and orthopedic surgeon  .    Review of Systems  Review of Systems   Constitutional:  Positive for chills, fever, malaise/fatigue and weight loss. Negative for diaphoresis.   HENT: Negative.  Negative for nosebleeds and sinus pain.         Loss of taste     Eyes:  Positive for blurred vision. Negative for double vision, photophobia, discharge and redness.   Respiratory: Negative.  Negative for cough, sputum production, shortness of breath, wheezing and stridor.    Cardiovascular: Negative.  Negative for chest pain, orthopnea, leg swelling and PND.   Gastrointestinal:  Positive for abdominal pain, constipation, nausea and vomiting. Negative for blood in stool and heartburn.   Genitourinary: Negative.  Negative for dysuria, flank pain and frequency.   Musculoskeletal:  Positive for joint pain (left foot 6/10) and myalgias. Negative for back pain and falls.   Skin:  Negative for itching.        Redness and swelling of left foot   Neurological:  Positive for headaches. Negative for dizziness, tingling, sensory change, focal weakness and seizures.   Endo/Heme/Allergies: Negative.  Negative for polydipsia. Does not bruise/bleed easily.   Psychiatric/Behavioral: Negative.  Negative for depression, substance abuse and suicidal ideas. The patient does not have insomnia.    All other systems reviewed and are negative.      Past Medical History   has a past medical history of Anxiety, Depression, Diabetes (HCC), GERD (gastroesophageal reflux disease), History of chickenpox, Indigestion, Jaundice, Liver disease, and Urinary incontinence.    Surgical History   has no past surgical history on file.     Family History  Diabetes and atrial fibrillation in her father, diabetes in her mom  Family history reviewed with patient. There is family history that is pertinent to the chief complaint.     Social History   reports that he has quit smoking. His smoking use included cigarettes. His smokeless tobacco use includes chew. He reports that he does not  currently use alcohol. He reports current drug use. Drugs: Marijuana, Inhaled, and Oral.    Allergies  No Known Allergies    Medications  None       Physical Exam  Temp:  [39.1 °C (102.3 °F)-39.7 °C (103.4 °F)] 39.7 °C (103.4 °F)  Pulse:  [] 85  Resp:  [16-20] 20  BP: (134-160)/(62-85) 134/65  SpO2:  [97 %-100 %] 97 %  Blood Pressure: (!) 146/62   Temperature: (!) 39.7 °C (103.4 °F)   Pulse: 90   Respiration: 20   Pulse Oximetry: 97 %       Physical Exam  Vitals and nursing note reviewed. Exam conducted with a chaperone present.   Constitutional:       General: He is not in acute distress.     Appearance: Normal appearance. He is well-developed and normal weight. He is not ill-appearing, toxic-appearing or diaphoretic.   HENT:      Head: Normocephalic and atraumatic.      Right Ear: External ear normal.      Left Ear: External ear normal.      Nose: Nose normal.      Mouth/Throat:      Mouth: Mucous membranes are moist.      Pharynx: Oropharynx is clear.   Eyes:      Extraocular Movements: Extraocular movements intact.      Conjunctiva/sclera: Conjunctivae normal.      Pupils: Pupils are equal, round, and reactive to light.   Neck:      Thyroid: No thyromegaly.      Vascular: No JVD.   Cardiovascular:      Rate and Rhythm: Normal rate and regular rhythm.      Pulses: Normal pulses.      Heart sounds: Normal heart sounds.   Pulmonary:      Effort: Pulmonary effort is normal.      Breath sounds: Normal breath sounds.   Chest:      Chest wall: No tenderness.   Abdominal:      General: Abdomen is flat. Bowel sounds are normal. There is no distension.      Palpations: Abdomen is soft. There is no mass.      Tenderness: There is no abdominal tenderness. There is no guarding or rebound.   Musculoskeletal:         General: Swelling and tenderness present. Normal range of motion.      Cervical back: Normal range of motion and neck supple.      Right lower leg: No edema.      Left lower leg: Edema present.    Lymphadenopathy:      Cervical: No cervical adenopathy.   Skin:     General: Skin is warm and dry.      Capillary Refill: Capillary refill takes more than 3 seconds.      Findings: Erythema present. No rash.   Neurological:      General: No focal deficit present.      Mental Status: He is alert and oriented to person, place, and time. Mental status is at baseline.      GCS: GCS eye subscore is 4. GCS verbal subscore is 5. GCS motor subscore is 6.      Cranial Nerves: No cranial nerve deficit.      Deep Tendon Reflexes: Reflexes are normal and symmetric.   Psychiatric:         Mood and Affect: Mood normal.         Behavior: Behavior normal.         Thought Content: Thought content normal.         Judgment: Judgment normal.         Laboratory:  Recent Labs     07/12/23  1557   WBC 10.8   RBC 4.68*   HEMOGLOBIN 13.4*   HEMATOCRIT 39.0*   MCV 83.3   MCH 28.6   MCHC 34.4   RDW 38.4   PLATELETCT 94*   MPV 11.9     Recent Labs     07/12/23  1557   SODIUM 123*   POTASSIUM 3.9   CHLORIDE 84*   CO2 21   GLUCOSE 209*   BUN 10   CREATININE 0.94   CALCIUM 8.8     Recent Labs     07/12/23  1557   ALTSGPT 14   ASTSGOT 18   ALKPHOSPHAT 197*   TBILIRUBIN 2.0*   GLUCOSE 209*         No results for input(s): NTPROBNP in the last 72 hours.      No results for input(s): TROPONINT in the last 72 hours.    Imaging:  DX-ANKLE 3+ VIEWS LEFT   Final Result      1.  There is diffuse swelling of the left ankle and hindfoot with no underlying osteomyelitis.      DX-FOOT-COMPLETE 3+ LEFT   Final Result      1.  There is diffuse swelling of the left foot predominantly adjacent to the 1st metatarsal with soft tissue air lucencies suggestive of cellulitis or potentially abscess.   2.  No radiographic evidence of osteomyelitis.      DX-CHEST-PORTABLE (1 VIEW)   Final Result      1.  There is no acute cardiopulmonary process.      MR-FOOT-W/O LEFT    (Results Pending)       X-Ray:  I have personally reviewed the images and compared with prior  images.  EKG:  I have personally reviewed the images and compared with prior images.  Early gas formation in the first metatarsal joint with cellulitis and abscess formation    Assessment/Plan:  Justification for Admission Status  I anticipate this patient will require at least two midnights for appropriate medical management, necessitating inpatient admission because patient has early necrotizing fasciitis with need of antibiotics and surgical intervention.  Patient will require at least 48 hours of inpatient management utilizing antibiotics pain management and obtaining cultures    Patient will need a Med/Surg bed on MEDICAL service .  The need is secondary to left foot infection.    * Abscess of left foot- (present on admission)  Assessment & Plan  Patient has developed swelling and redness of the left foot 2 days ago  Patient is coming to the hospital for evaluation and imaging studies at this point reveal a abscess formation with gas suspicious for necrotizing fasciitis  MRI has been ordered  Dr. Michael Ponce has been consulted  Antibiotics with Unasyn, vancomycin and clindamycin have been started after obtaining cultures from both the blood and the wound.  Pain management  Fluid resuscitation  Monitoring of lactic acid level  Evaluation procalcitonin level  Strict diabetic control          Hyponatremia- (present on admission)  Assessment & Plan  Most likely secondary to the infection sodium is down to 123  Give fluid resuscitation monitor sodium levels    Metabolic acidosis  Assessment & Plan  Metabolic acidosis most likely secondary to early sepsis  Corrected sepsis with antibiotics and fluid management and we should see an improvement in his metabolic acidosis.    Diabetes mellitus type II, controlled (HCC)  Assessment & Plan  -accus with sliding scale coverage  -diabetic diet  -diabetic education  -follow glycohemoglobin levels long term, most recent hemoglobin A1c is 5.3  -monitor for hypoglycemic episodes  and adjust control if he should get low    Portal venous hypertension (HCC)- (present on admission)  Assessment & Plan  History of portal hypertension continue at this point with blood pressure management using beta-blockers    Alcoholic hepatitis with ascites- (present on admission)  Assessment & Plan  History of alcoholic hepatitis and he has been in remission for 2-1/2 years  Continue to monitor    Normochromic normocytic anemia  Assessment & Plan  Monitor H&H if drops below 7 or 21 transfuse  Check a B12 level and iron level        VTE prophylaxis: SCDs/TEDs

## 2023-07-13 NOTE — ASSESSMENT & PLAN NOTE
Chronic DM ulcer which was healing with wound care, then he developed pain, swelling, fever  Imaging showed abscess formation with gas suspicious for necrotizing fasciitis s/p I&D by Dr. Ponce on 7/12  Operative cultures are polymicrobial  MRI shows osteomyelitis-patient declined any further orthopedic surgery  D/w ID- will need 6 wks IV abx  Continue Unasyn, add Dapto (to cover Corynebacterium) through 8/25  Strict diabetic control  PICC in place

## 2023-07-13 NOTE — CARE PLAN
"The patient is Watcher - Medium risk of patient condition declining or worsening    Shift Goals  Clinical Goals: monitor for fevers, pain control, safety, sleep >4 hours  Patient Goals: \"just not have any fevers\"    Progress made toward(s) clinical / shift goals:  Pt had fever last night, tylenol given. Denies pain. Able to rest in long intervals      Problem: Pain - Standard  Goal: Alleviation of pain or a reduction in pain to the patient’s comfort goal  7/12/2023 2242 by Brooklynn Ernandez R.N.  Outcome: Progressing  7/12/2023 2235 by Brooklynn Ernandez R.N.  Outcome: Progressing     Problem: Knowledge Deficit - Standard  Goal: Patient and family/care givers will demonstrate understanding of plan of care, disease process/condition, diagnostic tests and medications  7/12/2023 2242 by Brooklynn Ernandez R.N.  Outcome: Progressing  7/12/2023 2235 by ESTHER HessNMaile  Outcome: Progressing     Problem: Self Care  Goal: Patient will have the ability to perform ADLs independently or with assistance (bathe, groom, dress, toilet and feed)  Outcome: Progressing     Problem: Infection - Standard  Goal: Patient will remain free from infection  Outcome: Progressing     Problem: Wound/ / Incision Healing  Goal: Patient's wound/surgical incision will decrease in size and heals properly  Outcome: Progressing     "

## 2023-07-13 NOTE — PROGRESS NOTES
Hospital Medicine Daily Progress Note    Date of Service  7/13/2023    Chief Complaint  Esequiel Molina is a 33 y.o. male admitted 7/12/2023 with foot wound with drainage and foul odor    Hospital Course  History of alcoholic cirrhosis, sober for 2 years, type 2 diabetes admitted for left foot pain redness, wound and drainage.  He was found to have an abscess on imaging and likely surgery was consulted.  They performed an I&D on 7/12 with cultures were taken.  He started empiric IV antibiotics syncope.  Visual 7/13:    Interval Problem Update  7/13: MRI performed and confirms osteomyelitis and cellulitis.  He will likely need at least a partial amputation and will need ID consultation for IV antibiotics.  Pending final Ortho recommendations.  Continue IV Vanco and Unasyn.  Sugars are not at goal, Lantus added    I have discussed this patient's plan of care and discharge plan at IDT rounds today with Case Management, Nursing, Nursing leadership, and other members of the IDT team.    Consultants/Specialty  orthopedics    Code Status  Full Code    Disposition  The patient is not medically cleared for discharge to home or a post-acute facility.  Anticipate discharge to: home with close outpatient follow-up    I have placed the appropriate orders for post-discharge needs.    Review of Systems  Review of Systems   Constitutional:  Negative for chills, fever and malaise/fatigue.   HENT:  Negative for sore throat.    Respiratory:  Negative for cough and shortness of breath.    Cardiovascular:  Negative for chest pain and palpitations.   Gastrointestinal:  Negative for abdominal pain, blood in stool, diarrhea, heartburn, nausea and vomiting.   Genitourinary:  Negative for dysuria and frequency.   Musculoskeletal:  Negative for back pain and myalgias.   Skin:         Wound   Neurological:  Positive for weakness. Negative for dizziness, focal weakness and headaches.   Psychiatric/Behavioral:  Negative for depression and  hallucinations.    All other systems reviewed and are negative.       Physical Exam  Temp:  [35.6 °C (96.1 °F)-39.7 °C (103.4 °F)] 36.3 °C (97.4 °F)  Pulse:  [] 71  Resp:  [16-20] 17  BP: ()/(54-85) 133/66  SpO2:  [90 %-100 %] 100 %    Physical Exam  Constitutional:       Appearance: Normal appearance.   HENT:      Head: Normocephalic and atraumatic.      Nose: Nose normal.      Mouth/Throat:      Mouth: Mucous membranes are moist.   Eyes:      Extraocular Movements: Extraocular movements intact.      Pupils: Pupils are equal, round, and reactive to light.   Cardiovascular:      Rate and Rhythm: Normal rate and regular rhythm.      Heart sounds: No murmur heard.  Pulmonary:      Effort: Pulmonary effort is normal. No respiratory distress.      Breath sounds: Normal breath sounds. No stridor.   Abdominal:      General: Abdomen is flat. Bowel sounds are normal. There is no distension.      Palpations: Abdomen is soft.      Tenderness: There is no abdominal tenderness.   Musculoskeletal:         General: No swelling, tenderness or deformity.      Cervical back: Normal range of motion and neck supple.      Comments: Dressing c/d/i   Skin:     General: Skin is warm and dry.      Coloration: Skin is not pale.   Neurological:      General: No focal deficit present.      Mental Status: He is alert and oriented to person, place, and time. Mental status is at baseline.   Psychiatric:         Mood and Affect: Mood normal.         Behavior: Behavior normal.         Thought Content: Thought content normal.         Fluids    Intake/Output Summary (Last 24 hours) at 7/13/2023 1508  Last data filed at 7/13/2023 0900  Gross per 24 hour   Intake 1660 ml   Output 1760 ml   Net -100 ml       Laboratory  Recent Labs     07/12/23  1557 07/13/23  0009   WBC 10.8 9.3   RBC 4.68* 3.76*   HEMOGLOBIN 13.4* 10.9*   HEMATOCRIT 39.0* 31.9*   MCV 83.3 84.8   MCH 28.6 29.0   MCHC 34.4 34.2   RDW 38.4 39.1   PLATELETCT 94* 79*   MPV  11.9 12.6     Recent Labs     07/12/23  1557 07/13/23  0009   SODIUM 123* 123*   POTASSIUM 3.9 3.8   CHLORIDE 84* 88*   CO2 21 20   GLUCOSE 209* 363*   BUN 10 13   CREATININE 0.94 0.93   CALCIUM 8.8 7.9*     Recent Labs     07/12/23  1557   INR 1.48*               Imaging  MR-FOOT-WITH & W/O LEFT   Final Result      1.  Limited study due to extensive patient motion artifact and failed fat saturation.      2.  Some marrow edema and enhancement of the first metatarsal and the first proximal phalanx likely representing osteomyelitis.      3.  Extensive cellulitis involving the great toe with extension into the mid and forefoot most prominently medially. There are areas of soft tissue ulceration seen as well.      DX-ANKLE 3+ VIEWS LEFT   Final Result      1.  There is diffuse swelling of the left ankle and hindfoot with no underlying osteomyelitis.      DX-FOOT-COMPLETE 3+ LEFT   Final Result      1.  There is diffuse swelling of the left foot predominantly adjacent to the 1st metatarsal with soft tissue air lucencies suggestive of cellulitis or potentially abscess.   2.  No radiographic evidence of osteomyelitis.      DX-CHEST-PORTABLE (1 VIEW)   Final Result      1.  There is no acute cardiopulmonary process.           Assessment/Plan  * Abscess of left foot- (present on admission)  Assessment & Plan  Chronic ulcer which was healing, then developed pain, swelling, fever  Imaging showed abscess formation with gas suspicious for necrotizing fasciitis s/p I&D by Dr. Ponce on 7/12  F/U Cultures  MRI shows osteomyelitis  He will need 6 wks IV abx vs amputation  Will consult ID  Continue Unasyn/ vancomycin  Pain management  Strict diabetic control    Normochromic normocytic anemia  Assessment & Plan  Monitor H&H if drops below 7 or 21 transfuse  Check a B12 level and iron level    Metabolic acidosis  Assessment & Plan  Metabolic acidosis most likely secondary to early sepsis and lactic acidosis  Still elevated  today  Bolus, +IVF  Repeat lactate    Diabetes mellitus type II, controlled (HCC)  Assessment & Plan  Not at goal  Add lantus 6U, continue sliding scale  +IV fluid bolus for lactic acidosis and hyponatremia  -diabetic diet  -follow glycohemoglobin levels long term, most recent hemoglobin A1c is 5.3 (November 11/22)  -Repeat A1c    Hyponatremia- (present on admission)  Assessment & Plan  Most likely secondary to the infection and hyperglycemia  Check a1C  Bolus x1 and start NS at 75  Correct glucose levels    Portal venous hypertension (HCC)- (present on admission)  Assessment & Plan  History of portal hypertension  Continue blood pressure management using beta-blockers prn    Alcoholic hepatitis with ascites- (present on admission)  Assessment & Plan  History of alcoholic hepatitis and he has been in remission for 2-1/2 years  Continue to monitor         VTE prophylaxis: SCDs/TEDs, no pharm ppx until surgical plan is finalized    I have performed a physical exam and reviewed and updated ROS and Plan today (7/13/2023). In review of yesterday's note (7/12/2023), there are no changes except as documented above.    Total time:  52 minutes.  I spent greater than 50% of the time for patient care, counseling, and coordination on this date, including unit/floor time, and face-to-face time with the patient as per interval events and assessment and plan above

## 2023-07-13 NOTE — PROGRESS NOTES
"Subjective:    He is feeling much better this morning.  No active fevers or chills.  Foot pain is significantly improved.    Objective:  /62   Pulse (!) 57   Temp (!) 35.6 °C (96.1 °F) (Oral)   Resp 17   Ht 1.956 m (6' 5.01\")   Wt 94.4 kg (208 lb 1.8 oz)   SpO2 99%     Recent Labs     07/12/23  1557 07/13/23  0009   WBC 10.8 9.3   RBC 4.68* 3.76*   HEMOGLOBIN 13.4* 10.9*   HEMATOCRIT 39.0* 31.9*   MCV 83.3 84.8   MCH 28.6 29.0   MCHC 34.4 34.2   RDW 38.4 39.1   PLATELETCT 94* 79*   MPV 11.9 12.6     Recent Labs     07/12/23  1557 07/13/23  0009   SODIUM 123* 123*   POTASSIUM 3.9 3.8   CHLORIDE 84* 88*   CO2 21 20   GLUCOSE 209* 363*   BUN 10 13   CREATININE 0.94 0.93   CALCIUM 8.8 7.9*         Intake/Output Summary (Last 24 hours) at 7/13/2023 0637  Last data filed at 7/13/2023 0440  Gross per 24 hour   Intake 1440 ml   Output 1760 ml   Net -320 ml       Comfortable, no distress  Dressing intact just some mild plantar saturation  No proximal erythema or other signs of a sending infection      Assessment:  Doing well s/p total knee arthroplasty.    Plan:      Heel weightbearing to offload the ulcer  Antibiotics  Recommend MRI to further evaluate the extent of infection osteomyelitis in his foot  Discussed the role of ongoing wound management and antibiotics versus partial foot amputation, which we can discuss further once the MRI is completed.    "

## 2023-07-13 NOTE — ASSESSMENT & PLAN NOTE
Sugars are at goal last 24 hours  A1C of 5.8, neuropathy from DM contributing   Continue lantus 7U in hospital, Metformin at discharge   continue sliding scale   -diabetic diet

## 2023-07-13 NOTE — PROGRESS NOTES
"Pharmacy Vancomycin Kinetics Note for 7/12/2023     33 y.o. male on Vancomycin day # 1     Vancomycin Indication (AUC Dosing): Skin/skin structure infection    Provider specified end date: 07/22/23    Active Antibiotics (From admission, onward)      Ordered     Ordering Provider       Wed Jul 12, 2023  6:02 PM    07/12/23 1802  ampicillin/sulbactam (Unasyn) 3 g in  mL IVPB  EVERY 6 HOURS         Michael Al M.D.    07/12/23 1802  clindamycin (Cleocin) IVPB premix 600 mg  EVERY 8 HOURS         Michael Al M.D.    07/12/23 1802  MD Alert...Vancomycin per Pharmacy  PHARMACY TO DOSE        Question:  Indication(s) for vancomycin?  Answer:  Skin and soft tissue infection    Michael Al M.D.       Wed Jul 12, 2023  4:04 PM    07/12/23 1604  vancomycin 2250 mg/500mL NS IVPB premix  (vancomycin (VANCOCIN) IV (LD + Maintenance))  ONCE         Werner Jones M.D.            Dosing Weight: 94.3 kg (207 lb 14.3 oz)      Admission History: Admitted on 7/12/2023 for Abscess of left foot [L02.612]  Pertinent history: Patient coming in with rapidly worsening non-healing ulcer on foot. Wound has been present for months, but increasing in size and redness over the last few days prior to arrival. On arrival, the patient is tachycardic, febrile and elevated markers of inflammation. Xray concerning for necrotizing fascitis. Empiric broad spectrum antibiotics initiated.    Allergies:     Patient has no known allergies.     Pertinent cultures to date:     Results       Procedure Component Value Units Date/Time    Culture Wound W/ Gram Stain [[id number]]     Order Status: Sent Specimen: Wound from Left Foot     Blood Culture [[id number]] Collected: 07/12/23 7571    Order Status: Sent Specimen: Blood from Peripheral Updated: 07/12/23 1622    Narrative:      Per Hospital Policy: Only change Specimen Src: to \"Line\" if  specified by physician order.    Urine Culture (New) [[id number]] Collected: 07/12/23 1550    Order Status: " "Sent Specimen: Urine Updated: 23    Narrative:      Indication for culture:->Evaluation for sepsis without a  clear source of infection    Urinalysis [[id number]]  (Abnormal) Collected: 23 155    Order Status: Completed Specimen: Blood Updated: 23     Color Yellow     Character Hazy     Specific Gravity 1.025     Ph 5.5     Glucose 100 mg/dL      Ketones Negative mg/dL      Protein 100 mg/dL      Bilirubin Moderate     Nitrite Positive     Leukocyte Esterase Negative     Occult Blood Trace     Micro Urine Req Microscopic    Narrative:      Indication for culture:->Evaluation for sepsis without a  clear source of infection    Blood Culture [[id number]] Collected: 23    Order Status: Sent Specimen: Blood from Peripheral Updated: 23    Narrative:      Per Hospital Policy: Only change Specimen Src: to \"Line\" if  specified by physician order.            Labs:     Estimated Creatinine Clearance: 140.9 mL/min (by C-G formula based on SCr of 0.94 mg/dL).  Recent Labs     23  1557   WBC 10.8   NEUTSPOLYS 90.00*     Recent Labs     23  1557   BUN 10   CREATININE 0.94   ALBUMIN 4.0     No intake or output data in the 24 hours ending 23 1807   /65   Pulse 85   Temp (!) 39.7 °C (103.4 °F) (Oral)   Resp 20   Ht 1.956 m (6' 5\")   Wt 94.4 kg (208 lb 1.8 oz)   SpO2 97%  Temp (24hrs), Av.4 °C (102.9 °F), Min:39.1 °C (102.3 °F), Max:39.7 °C (103.4 °F)      List concerns for Vancomycin clearance:     None    Pharmacokinetics: AUC Dosing    AUC kinetics:   Ke (hr ^-1): 0.1214 hr^-1  Half life: 5.71 hr  Clearance: 7.441  Estimated TDD: 3720.5  Estimated Dose: 1194  Estimated interval: 7.7  .    A/P:     -  Vancomycin dose: 1250 mg q8h    -  Next vancomycin level(s): Not currently scheduled,  consider after 4th maintenance dose.     -  Predicted vancomycin AUC from initial AUC test calculator: 504 mg·hr/L    -  Comments: MRSA nares ordered. Please continue " to monitor renal function, urine output and vancomycin levels for dosing adjustments. Please also follow cultures and signs of clinical cure for de-escalation of therapy.       Taisha Hall, PharmD, BCEMP

## 2023-07-13 NOTE — OP REPORT
Preop Diagnosis: Complicated left foot infection with large abscess and plantar ulcer in a patient with alcoholic cirrhosis  Postop Diagnosis:  Same  Procedure: Irrigation and debridement with drainage of abscess from plantar and dorsal aspects of the left foot   Surgeon: Dr. Michael Ponce MD  Anesthesia: General   Estimated Blood Loss: Min  Drains: None  Complications: None     Indications: He resented the emergency room with worsening left foot infection.  He had a ulcer on that side since about November which suddenly worsened.  We discussed the options.  Given his acute septic nature, he is indicated for urgent irrigation and debridement, which would likely need to be followed up with subsequent more formal surgery potentially involving amputation of the first ray or transmetatarsal region.  Discussed the pros and cons and risks and benefits of surgery as well as potential risk of worsening infection and loss of limb.  He expressed understanding wish to proceed.    Pertinent Findings: There was a full-thickness ulcer all the way to bone on the plantar aspect under the first metatarsal head.  There is also a large abscess involving this area extending around to the dorsal aspect of the foot extending across the nearly entire medial lateral extent of the forefoot.    Informed consent and site marking were confirmed in preop.  He was brought back to the OR where anesthesia was performed. Supine positioning was perfmored with all bony prominences well padded.  The extremity was prepped and draped in the usual sterile fashion. I performed a pre-procedure timeout to confirm we had the correct patient, side, site, procedure, and presence of necessary personal and equipment.  Antibiotics have been given.    I started by debriding and exploring the plantar wound.  There is a very foul odor and significant purulence noted.  Knife and rongeur were used to create full-thickness debridement of this tissue down to the level of  bone and tendon.  I then probed around and ultimately felt like his abscess was likely to be most profound over the dorsal aspect of the foot, particularly focused around the great toe.  A dorsal lateral incision was made over the great toe and there was a large abscess encountered which was evacuated.  This wound bed was probed and traveled all the way to the lateral aspect of the dorsal forefoot.  Also tracked around medially communicated with the plantar ulcer.  Blunt dissection was used to open this area up to get rid of all the loculations.  The wound was then copiously irrigated.  Multiple cultures were taken and sent.  A Penrose drain was placed in the abscess cavity and connected from the dorsal to plantar aspects.  The dorsal wound was loosely closed with nylon, and then the wounds were dressed with gauze and a compressive wrap was applied with cast padding and an Ace wrap.      Plan: Nonweightbearing on left foot.  Broad-spectrum antibiotics until cultures are back.  He will ultimately require an MRI of his left foot likely followed by definitive surgical management which based on findings tonight would most likely be a transmetatarsal amputation, lose weight for the MRI before making a definitive decision.

## 2023-07-13 NOTE — PROGRESS NOTES
2040: Pt arrived from PACU with MALINA Bui. A&Ox4. Transferred himself from [facility] to bed. Denies pain. Reviewed plan of care for the night and oriented pt to room  2121: Pt noted to have fever. Tylenol administered and MD notified. No new orders  2130: Skin check completed. Pt eating snack.  2230: Assisted to bathroom. Pt had large BM.   2315: Pt sweating, linen changed  0005: Assisted to bathroom, had another large BM  0130: Lactic Acid resulted 3.6. Notified MD, orders received for bolus, pt resting, denies pain  0255: Up to bathroom with CNA, tolerating well  0445: Awake, watching TV, denies pain  0600: MD at bedside. Pt awake and conversant with MD.

## 2023-07-13 NOTE — PROGRESS NOTES
Received bedside report from night RN. Patient alert and oriented x4. Reports 2 out 10 left foot pain, refused any intervention at this time. Discussed plan of care and understood. Assessment per [facility]. Falls precaution in place. Call light placed within reach. Will continue to monitor.

## 2023-07-13 NOTE — ASSESSMENT & PLAN NOTE
History of alcoholic hepatitis and he has been in remission for 2-1/2 years  He demonstrates pancytopenia, chronic and stable  Continue to monitor

## 2023-07-13 NOTE — HOSPITAL COURSE
This is a gentleman with a history of alcohol abuse (recovered), DM II and GERD who presented to hospital with a diabetic foot wound on 7/12/23. MRI foot was performed and demonstrated osteo of the first metatarsal and proximal phalanx - the pt underwent I&D with drainage of an abscess from the plantar and dorsal aspects of the L foot with Dr Ponce on 7/12 as the patient did not wish for amputation. Blood cultures from 7/12/23 were positive for strep intermedius, and foot cultures grew VIDAL, strep intermedius, Corynebacterium, pasteurella, bacteroides, prevotella and enterococcus. Repeat blood cultures taken on 7/14 were negative. ID was consulted and are recommending 6 weeks of Dapto and Ertapenem from 7/14 with a stop date of 8/25. The patient is uninsured and has agreed to pay out of pocket for wound care services, and we are looking to supplement access for [facility] for him.

## 2023-07-13 NOTE — PROGRESS NOTES
Rounding     2040 Arrived from PACU  2050 Vitals check, snack and water provided, emptied urinal   2121 Vitals check   2204 Vitals check   2233 Vitals check, snack  0005 Up to the bathroom   0126 Vitals check   0225 Urinal emptied   0300 Up to the bathroom, linen change   0405 Vitals check   0615 Patient watching TV

## 2023-07-13 NOTE — OR NURSING
1957: Patient arrived to PACU from OR via bed. Report received from anesthesia and RN. Respirations are spontaneous and unlabored. VSS on 6L. Dressing is CDI. Cold pack applied. LLE elevated. LLE: cap refill less than 3 seconds, warm, pink. OPA in place.    0812: OPA removed    0820: family updated    0830: meets criteria for transfer to room. Report called to receiving RN

## 2023-07-13 NOTE — CONSULTS
33-year-old male with a history of cirrhosis and chronic left foot ulcer presents to the emergency department this afternoon with increased pain and swelling.  He was febrile on admission with increased inflammatory markers and lactic acid.  He has been started on IV antibiotics.  The MRI machine is not currently functional, and Dr. Al with the medical service was concerned that he undergo urgent debridement to help with source control given his immunocompromise state as well as his significant symptoms.  Patient states he has been treating this ulcer since about November and was attending wound care down in [place] until about February and has been taking care of the wound on his own since then.  Things have been relatively stable until Monday.  Denies pain in the other extremities.     Past Medical History:   Diagnosis Date    Anxiety     Depression     Diabetes (HCC)     GERD (gastroesophageal reflux disease)     History of chickenpox     Indigestion     Jaundice     Liver disease     Urinary incontinence     bowel incontnence        History reviewed. No pertinent surgical history.    Medications  No current facility-administered medications on file prior to encounter.     No current outpatient medications on file prior to encounter.       Allergies  Patient has no known allergies.    ROS  All other systems were reviewed and found to be negative    History reviewed. No pertinent family history.    Social History     Socioeconomic History    Marital status: Single   Tobacco Use    Smoking status: Former     Types: Cigarettes    Smokeless tobacco: Current     Types: Chew   Vaping Use    Vaping Use: Former    Start date: 1/11/2017    Quit date: 1/11/2020    Substances: Nicotine, THC    Devices: Pre-filled pod   Substance and Sexual Activity    Alcohol use: Not Currently    Drug use: Yes     Types: Marijuana, Inhaled, Oral     Comment: marijuana    Sexual activity: Not Currently       Physical Exam  Vitals  BP  "136/61   Pulse 90   Temp 37.6 °C (99.7 °F) (Temporal)   Resp 20   Ht 1.956 m (6' 5.01\")   Wt 94.4 kg (208 lb 1.8 oz)   SpO2 97%   General: Well Developed, Well Nourished, laying supine   HEENT: Normocephalic, atraumatic  Lungs: Non labored breathing  Heart: Regular rate and rhythm  Abdomen: Soft, NT, ND  Skin: Plantar ulcer over the first metatarsal head of the left foot  Extremities: He has significant swelling in the left foot most pronounced in the great toe.  Neuro: Diminished sensation in the feet  Vascular: Capillary refill <2 seconds    Radiographs:  DX-ANKLE 3+ VIEWS LEFT   Final Result      1.  There is diffuse swelling of the left ankle and hindfoot with no underlying osteomyelitis.      DX-FOOT-COMPLETE 3+ LEFT   Final Result      1.  There is diffuse swelling of the left foot predominantly adjacent to the 1st metatarsal with soft tissue air lucencies suggestive of cellulitis or potentially abscess.   2.  No radiographic evidence of osteomyelitis.      DX-CHEST-PORTABLE (1 VIEW)   Final Result      1.  There is no acute cardiopulmonary process.      MR-FOOT-W/O LEFT    (Results Pending)       Laboratory Values  Recent Labs     07/12/23  1557   WBC 10.8   RBC 4.68*   HEMOGLOBIN 13.4*   HEMATOCRIT 39.0*   MCV 83.3   MCH 28.6   MCHC 34.4   RDW 38.4   PLATELETCT 94*   MPV 11.9     Recent Labs     07/12/23  1557   SODIUM 123*   POTASSIUM 3.9   CHLORIDE 84*   CO2 21   GLUCOSE 209*   BUN 10     Recent Labs     07/12/23  1557   INR 1.48*         Impression: Significant left foot infection in an immunocompromised patient with cirrhosis    Plan: Discussed the options.  Given the fact that we do not have an MRI with full detail on the extent of his infection and potential osteomyelitis, and the arterial structures were grasped.  I we will plan on performing irrigation and debridement tonight to help obtain source control and help him get stabilized.  Discussed that he will likely require more formal surgery " potentially involving amputation of the great toe or transmetatarsal region depending on how his hospital course progresses.  Discussed the risk of worsening infection or possible loss of limb as well as sepsis and worsening infection.

## 2023-07-13 NOTE — DISCHARGE PLANNING
ER CM met with pt and father at bedside. AOX4 Very pleasant. He lives in 1 story home with Father William and Step mother Marianne. His birth mother Evette is also supportive and lives in [place]. He has been able to weightbear. Size 13 foot . RX Anatoli[facility] Pedroza PCP Dr Juvenal CORMIER  Foot profound swelling. Denies ETOH Hx remote tho. Hx glipizide. Wound care pictures in past.   Care Transition Team Assessment    Information Source  Orientation Level: Oriented X4  Information Given By: Patient  Informant's Name: Esequiel  Who is responsible for making decisions for patient? : Patient         Elopement Risk  Legal Hold: No    Interdisciplinary Discharge Planning  Does Admitting Nurse Feel This Could be a Complex Discharge?: Yes  Primary Care Physician: MEREDITH CORMIER  Lives with - Patient's Self Care Capacity: Parents  Support Systems: Parent  Housing / Facility: [address] [facility]  Do You Take your Prescribed Medications Regularly: Yes  Mobility Issues: Yes  Assistance Needed: Yes  Durable Medical Equipment: Other - Specify (possible walking boot new that father may have. Size 13 but may still need)    Discharge Preparedness  What is your plan after discharge?: Home with help  What are your discharge supports?: Parent  Prior Functional Level: Ambulatory, Independent with Activities of Daily Living    Functional Assesment  Prior Functional Level: Ambulatory, Independent with Activities of Daily Living    Finances  Prescription Coverage: No                   Domestic Abuse  Have you ever been the victim of abuse or violence?: No    Psychological Assessment  History of Substance Abuse: None (etoh hx)    Discharge Risks or Barriers  Discharge risks or barriers?: Uninsured / underinsured    Anticipated Discharge Information  Discharge Disposition: D/T to home under A care in anticipation of covered skilled care (06)

## 2023-07-13 NOTE — ANESTHESIA PROCEDURE NOTES
Airway    Date/Time: 7/12/2023 7:32 PM    Performed by: Pranav Lubin M.D.  Authorized by: Pranav Lubin M.D.    Location:  OR  Urgency:  Elective  Indications for Airway Management:  Anesthesia      Spontaneous Ventilation: absent    Sedation Level:  Deep  Preoxygenated: Yes    Mask Difficulty Assessment:  0 - not attempted  Final Airway Type:  Supraglottic airway  Final Supraglottic Airway:  Standard LMA    SGA Size:  5  Number of Attempts at Approach:  1

## 2023-07-13 NOTE — ANESTHESIA POSTPROCEDURE EVALUATION
Patient: Esequiel Molina    Procedure Summary     Date: 07/12/23 Room / Location:  OR  / SURGERY [facility]    Anesthesia Start: 1926 Anesthesia Stop: 1958    Procedure: INCISION AND DRAINAGE foot (Left: Foot) Diagnosis: (left foot infection)    Surgeons: Michael Ponce M.D. Responsible Provider: Pranav Lubin M.D.    Anesthesia Type: general ASA Status: 4 - Emergent          Final Anesthesia Type: general  Last vitals  BP   Blood Pressure: 133/70    Temp   36.3 °C (97.3 °F)    Pulse   96   Resp   20    SpO2   96 %      Anesthesia Post Evaluation    Patient location during evaluation: PACU  Patient participation: complete - patient participated  Level of consciousness: awake and alert  Pain score: 2    Airway patency: patent  Anesthetic complications: no  Cardiovascular status: hemodynamically stable  Respiratory status: acceptable  Hydration status: euvolemic    PONV: none          No notable events documented.     Nurse Pain Score: 2 (NPRS)

## 2023-07-13 NOTE — PROGRESS NOTES
4 Eyes Skin Assessment Completed by Milana RN and MALINA Ruiz.    Head WDL  Ears WDL  Nose WDL  Mouth WDL  Neck WDL  Breast/Chest WDL  Shoulder Blades WDL  Spine Acne scars  (R) Arm/Elbow/Hand WDL  (L) Arm/Elbow/Hand WDL  Abdomen WDL  Groin WDL  Scrotum/Coccyx/Buttocks WDL  (R) Leg WDL  (L) Leg WDL  (R) Heel/Foot/Toe WDL  (L) Heel/Foot/Toe Surgical wound          Devices In Places Blood Pressure Cuff, Pulse Ox, and SCD's      Interventions In Place Pillows and Heels Loaded W/Pillows    Possible Skin Injury No    Pictures Uploaded Into Epic N/A  Wound Consult Placed N/A  RN Wound Prevention Protocol Ordered No

## 2023-07-13 NOTE — ANESTHESIA TIME REPORT
Anesthesia Start and Stop Event Times     Date Time Event    7/12/2023 1839 Ready for Procedure     1926 Anesthesia Start     1958 Anesthesia Stop        Responsible Staff  07/12/23    Name Role Begin End    Pranav Lubin M.D. Anesth 1926 1958        Overtime Reason:  no overtime (within assigned shift)    Comments:

## 2023-07-13 NOTE — WOUND TEAM
"Wound consult received regarding patient left foot. Patient went to OR with MD Ponce on 7/12/23 for left foot I&D. Per OP note, penrose drain was placed, dorsal foot closed with nylon and gauze dressings with ACE wrap applied.   Per MD Ponce: \"Nonweightbearing on left foot.  Broad-spectrum antibiotics until cultures are back.  He will ultimately require an MRI of his left foot likely followed by definitive surgical management which based on findings tonight would most likely be a transmetatarsal amputation, lose weight for the MRI before making a definitive decision.\"    Wound team signing off at this time, MD Ponce to reconsult or reach out to wound team if our follow up or interventions are needed. Please call for questions or concerns.   "

## 2023-07-14 PROBLEM — R78.81 BACTEREMIA: Status: ACTIVE | Noted: 2023-07-14

## 2023-07-14 PROBLEM — E83.39 HYPOPHOSPHATEMIA: Status: ACTIVE | Noted: 2023-07-14

## 2023-07-14 LAB
ALBUMIN SERPL BCP-MCNC: 3 G/DL (ref 3.2–4.9)
BACTERIA WND AEROBE CULT: ABNORMAL
BACTERIA WND AEROBE CULT: ABNORMAL
BASOPHILS # BLD AUTO: 0.1 % (ref 0–1.8)
BASOPHILS # BLD: 0.01 K/UL (ref 0–0.12)
BUN SERPL-MCNC: 13 MG/DL (ref 8–22)
CALCIUM ALBUM COR SERPL-MCNC: 8.9 MG/DL (ref 8.5–10.5)
CALCIUM SERPL-MCNC: 8.1 MG/DL (ref 8.4–10.2)
CHLORIDE SERPL-SCNC: 102 MMOL/L (ref 96–112)
CO2 SERPL-SCNC: 22 MMOL/L (ref 20–33)
CREAT SERPL-MCNC: 0.64 MG/DL (ref 0.5–1.4)
EOSINOPHIL # BLD AUTO: 0.02 K/UL (ref 0–0.51)
EOSINOPHIL NFR BLD: 0.3 % (ref 0–6.9)
ERYTHROCYTE [DISTWIDTH] IN BLOOD BY AUTOMATED COUNT: 40.2 FL (ref 35.9–50)
GFR SERPLBLD CREATININE-BSD FMLA CKD-EPI: 128 ML/MIN/1.73 M 2
GLUCOSE BLD STRIP.AUTO-MCNC: 130 MG/DL (ref 65–99)
GLUCOSE BLD STRIP.AUTO-MCNC: 148 MG/DL (ref 65–99)
GLUCOSE BLD STRIP.AUTO-MCNC: 148 MG/DL (ref 65–99)
GLUCOSE BLD STRIP.AUTO-MCNC: 149 MG/DL (ref 65–99)
GLUCOSE SERPL-MCNC: 151 MG/DL (ref 65–99)
GRAM STN SPEC: ABNORMAL
HCT VFR BLD AUTO: 30.7 % (ref 42–52)
HGB BLD-MCNC: 10.4 G/DL (ref 14–18)
IMM GRANULOCYTES # BLD AUTO: 0.04 K/UL (ref 0–0.11)
IMM GRANULOCYTES NFR BLD AUTO: 0.6 % (ref 0–0.9)
LYMPHOCYTES # BLD AUTO: 0.72 K/UL (ref 1–4.8)
LYMPHOCYTES NFR BLD: 10.3 % (ref 22–41)
MAGNESIUM SERPL-MCNC: 1.9 MG/DL (ref 1.5–2.5)
MCH RBC QN AUTO: 28.4 PG (ref 27–33)
MCHC RBC AUTO-ENTMCNC: 33.9 G/DL (ref 32.3–36.5)
MCV RBC AUTO: 83.9 FL (ref 81.4–97.8)
MONOCYTES # BLD AUTO: 0.54 K/UL (ref 0–0.85)
MONOCYTES NFR BLD AUTO: 7.7 % (ref 0–13.4)
NEUTROPHILS # BLD AUTO: 5.67 K/UL (ref 1.82–7.42)
NEUTROPHILS NFR BLD: 81 % (ref 44–72)
NRBC # BLD AUTO: 0 K/UL
NRBC BLD-RTO: 0 /100 WBC (ref 0–0.2)
PHOSPHATE SERPL-MCNC: 1.9 MG/DL (ref 2.5–4.5)
PLATELET # BLD AUTO: 85 K/UL (ref 164–446)
PLATELETS.RETICULATED NFR BLD AUTO: 14.8 % (ref 0.6–13.1)
PMV BLD AUTO: 12.6 FL (ref 9–12.9)
POTASSIUM SERPL-SCNC: 3.4 MMOL/L (ref 3.6–5.5)
RBC # BLD AUTO: 3.66 M/UL (ref 4.7–6.1)
SIGNIFICANT IND 70042: ABNORMAL
SITE SITE: ABNORMAL
SODIUM SERPL-SCNC: 136 MMOL/L (ref 135–145)
SOURCE SOURCE: ABNORMAL
VANCOMYCIN PEAK 2573: 25.2 UG/ML (ref 20–40)
VANCOMYCIN TROUGH SERPL-MCNC: 17.1 UG/ML (ref 10–20)
WBC # BLD AUTO: 7 K/UL (ref 4.8–10.8)

## 2023-07-14 PROCEDURE — 36415 COLL VENOUS BLD VENIPUNCTURE: CPT

## 2023-07-14 PROCEDURE — A9270 NON-COVERED ITEM OR SERVICE: HCPCS | Performed by: INTERNAL MEDICINE

## 2023-07-14 PROCEDURE — 700105 HCHG RX REV CODE 258: Performed by: HOSPITALIST

## 2023-07-14 PROCEDURE — 85025 COMPLETE CBC W/AUTO DIFF WBC: CPT

## 2023-07-14 PROCEDURE — 94760 N-INVAS EAR/PLS OXIMETRY 1: CPT

## 2023-07-14 PROCEDURE — 97602 WOUND(S) CARE NON-SELECTIVE: CPT

## 2023-07-14 PROCEDURE — 700105 HCHG RX REV CODE 258: Performed by: INTERNAL MEDICINE

## 2023-07-14 PROCEDURE — 770001 HCHG ROOM/CARE - MED/SURG/GYN PRIV*

## 2023-07-14 PROCEDURE — 80069 RENAL FUNCTION PANEL: CPT

## 2023-07-14 PROCEDURE — 700102 HCHG RX REV CODE 250 W/ 637 OVERRIDE(OP): Performed by: INTERNAL MEDICINE

## 2023-07-14 PROCEDURE — 85055 RETICULATED PLATELET ASSAY: CPT

## 2023-07-14 PROCEDURE — 82962 GLUCOSE BLOOD TEST: CPT | Mod: 91

## 2023-07-14 PROCEDURE — 99255 IP/OBS CONSLTJ NEW/EST HI 80: CPT | Performed by: INTERNAL MEDICINE

## 2023-07-14 PROCEDURE — 87040 BLOOD CULTURE FOR BACTERIA: CPT

## 2023-07-14 PROCEDURE — 700111 HCHG RX REV CODE 636 W/ 250 OVERRIDE (IP): Mod: JZ | Performed by: INTERNAL MEDICINE

## 2023-07-14 PROCEDURE — 99233 SBSQ HOSP IP/OBS HIGH 50: CPT | Performed by: INTERNAL MEDICINE

## 2023-07-14 PROCEDURE — 80202 ASSAY OF VANCOMYCIN: CPT | Mod: 91

## 2023-07-14 PROCEDURE — 83735 ASSAY OF MAGNESIUM: CPT

## 2023-07-14 PROCEDURE — 700101 HCHG RX REV CODE 250: Performed by: HOSPITALIST

## 2023-07-14 PROCEDURE — 700111 HCHG RX REV CODE 636 W/ 250 OVERRIDE (IP): Performed by: HOSPITALIST

## 2023-07-14 PROCEDURE — 302009 SKINTEGRITY WOUND CLEANSER: Performed by: ORTHOPAEDIC SURGERY

## 2023-07-14 RX ORDER — MAGNESIUM SULFATE HEPTAHYDRATE 40 MG/ML
2 INJECTION, SOLUTION INTRAVENOUS ONCE
Status: COMPLETED | OUTPATIENT
Start: 2023-07-14 | End: 2023-07-14

## 2023-07-14 RX ADMIN — INSULIN GLARGINE-YFGN 6 UNITS: 100 INJECTION, SOLUTION SUBCUTANEOUS at 06:05

## 2023-07-14 RX ADMIN — DIBASIC SODIUM PHOSPHATE, MONOBASIC POTASSIUM PHOSPHATE AND MONOBASIC SODIUM PHOSPHATE 500 MG: 852; 155; 130 TABLET ORAL at 17:38

## 2023-07-14 RX ADMIN — AMPICILLIN SODIUM AND SULBACTAM SODIUM 3 G: 2; 1 INJECTION, POWDER, FOR SOLUTION INTRAMUSCULAR; INTRAVENOUS at 17:38

## 2023-07-14 RX ADMIN — MAGNESIUM SULFATE HEPTAHYDRATE 2 G: 2 INJECTION, SOLUTION INTRAVENOUS at 07:58

## 2023-07-14 RX ADMIN — AMPICILLIN SODIUM AND SULBACTAM SODIUM 3 G: 2; 1 INJECTION, POWDER, FOR SOLUTION INTRAMUSCULAR; INTRAVENOUS at 12:06

## 2023-07-14 RX ADMIN — SODIUM CHLORIDE: 9 INJECTION, SOLUTION INTRAVENOUS at 04:43

## 2023-07-14 RX ADMIN — DIBASIC SODIUM PHOSPHATE, MONOBASIC POTASSIUM PHOSPHATE AND MONOBASIC SODIUM PHOSPHATE 500 MG: 852; 155; 130 TABLET ORAL at 07:59

## 2023-07-14 RX ADMIN — AMPICILLIN SODIUM AND SULBACTAM SODIUM 3 G: 2; 1 INJECTION, POWDER, FOR SOLUTION INTRAMUSCULAR; INTRAVENOUS at 04:45

## 2023-07-14 RX ADMIN — VANCOMYCIN HYDROCHLORIDE 1250 MG: 1 INJECTION, POWDER, LYOPHILIZED, FOR SOLUTION INTRAVENOUS at 00:15

## 2023-07-14 ASSESSMENT — ENCOUNTER SYMPTOMS
COUGH: 0
HEARTBURN: 0
CONSTIPATION: 0
WEAKNESS: 0
BLOOD IN STOOL: 0
DIZZINESS: 0
WEAKNESS: 1
DEPRESSION: 0
BACK PAIN: 0
MYALGIAS: 0
HEADACHES: 0
ROS SKIN COMMENTS: WOUND
SORE THROAT: 0
NERVOUS/ANXIOUS: 0
SPUTUM PRODUCTION: 0
NAUSEA: 0
HALLUCINATIONS: 0
SHORTNESS OF BREATH: 0
ABDOMINAL PAIN: 0
BLURRED VISION: 0
VOMITING: 0
FOCAL WEAKNESS: 0
CHILLS: 0
DIARRHEA: 0
FEVER: 0
PALPITATIONS: 0

## 2023-07-14 ASSESSMENT — PAIN DESCRIPTION - PAIN TYPE
TYPE: SURGICAL PAIN
TYPE: SURGICAL PAIN

## 2023-07-14 NOTE — PROGRESS NOTES
[facility] Medicine Daily Progress Note    Date of Service  7/14/2023    Chief Complaint  Esequiel Molina is a 33 y.o. male admitted 7/12/2023 with foot wound with drainage and foul odor    Hospital Course  History of alcoholic cirrhosis, sober for 2 years, type 2 diabetes admitted for left foot pain redness, wound and drainage.  He was found to have an abscess on imaging and likely surgery was consulted.  They performed an I&D on 7/12 with cultures were taken.  He started empiric IV antibiotics syncope.  Visual 7/13:    Interval Problem Update  7/13: MRI performed and confirms osteomyelitis and cellulitis.  He will likely need at least a partial amputation and will need ID consultation for IV antibiotics.  Pending final Ortho recommendations.  Continue IV Vanco and Unasyn.  Sugars are not at goal, Lantus added    7/14: Patient declining surgery, ID consulted for antibiotic treatment.  Cultures with MSSA.  Positive blood culture from 7/12 (strep), repeat drawn.  Sugars improved with Lantus.  Phos low, replaced    I have discussed this patient's plan of care and discharge plan at IDT rounds today with Case Management, Nursing, Nursing leadership, and other members of the IDT team.    Consultants/Specialty  orthopedics    Code Status  Full Code    Disposition  The patient is not medically cleared for discharge to home or a post-acute facility.  Anticipate discharge to: home with close outpatient follow-up    I have placed the appropriate orders for post-discharge needs.    Review of Systems  Review of Systems   Constitutional:  Negative for chills, fever and malaise/fatigue.   HENT:  Negative for sore throat.    Respiratory:  Negative for cough and shortness of breath.    Cardiovascular:  Negative for chest pain and palpitations.   Gastrointestinal:  Negative for abdominal pain, blood in stool, diarrhea, heartburn, nausea and vomiting.   Genitourinary:  Negative for dysuria and frequency.   Musculoskeletal:  Negative for  back pain and myalgias.   Skin:         Wound   Neurological:  Positive for weakness. Negative for dizziness, focal weakness and headaches.   Psychiatric/Behavioral:  Negative for depression and hallucinations.    All other systems reviewed and are negative.       Physical Exam  Temp:  [36.6 °C (97.9 °F)-37.5 °C (99.5 °F)] 36.6 °C (97.9 °F)  Pulse:  [67-82] 76  Resp:  [16-18] 18  BP: ()/(7-73) 113/67  SpO2:  [97 %-100 %] 100 %    Physical Exam  Constitutional:       Appearance: Normal appearance.   HENT:      Head: Normocephalic and atraumatic.      Nose: Nose normal.      Mouth/Throat:      Mouth: Mucous membranes are moist.   Eyes:      Extraocular Movements: Extraocular movements intact.      Pupils: Pupils are equal, round, and reactive to light.   Cardiovascular:      Rate and Rhythm: Normal rate and regular rhythm.      Heart sounds: No murmur heard.  Pulmonary:      Effort: Pulmonary effort is normal. No respiratory distress.      Breath sounds: Normal breath sounds. No stridor.   Abdominal:      General: Abdomen is flat. Bowel sounds are normal. There is no distension.      Palpations: Abdomen is soft.      Tenderness: There is no abdominal tenderness.   Musculoskeletal:         General: No swelling, tenderness or deformity.      Cervical back: Normal range of motion and neck supple.      Comments: Dressing c/d/I   Skin:     General: Skin is warm and dry.      Coloration: Skin is not pale.   Neurological:      General: No focal deficit present.      Mental Status: He is alert and oriented to person, place, and time. Mental status is at baseline.   Psychiatric:         Speech: Speech is rapid and pressured.         Behavior: Behavior normal.         Fluids    Intake/Output Summary (Last 24 hours) at 7/14/2023 1200  Last data filed at 7/14/2023 0600  Gross per 24 hour   Intake 940 ml   Output --   Net 940 ml       Laboratory  Recent Labs     07/12/23  1557 07/13/23  0009 07/14/23  0346   WBC 10.8 9.3 7.0    RBC 4.68* 3.76* 3.66*   HEMOGLOBIN 13.4* 10.9* 10.4*   HEMATOCRIT 39.0* 31.9* 30.7*   MCV 83.3 84.8 83.9   MCH 28.6 29.0 28.4   MCHC 34.4 34.2 33.9   RDW 38.4 39.1 40.2   PLATELETCT 94* 79* 85*   MPV 11.9 12.6 12.6     Recent Labs     07/12/23  1557 07/13/23  0009 07/14/23  0346   SODIUM 123* 123* 136   POTASSIUM 3.9 3.8 3.4*   CHLORIDE 84* 88* 102   CO2 21 20 22   GLUCOSE 209* 363* 151*   BUN 10 13 13   CREATININE 0.94 0.93 0.64   CALCIUM 8.8 7.9* 8.1*     Recent Labs     07/12/23  1557   INR 1.48*               Imaging  MR-FOOT-WITH & W/O LEFT   Final Result      1.  Limited study due to extensive patient motion artifact and failed fat saturation.      2.  Some marrow edema and enhancement of the first metatarsal and the first proximal phalanx likely representing osteomyelitis.      3.  Extensive cellulitis involving the great toe with extension into the mid and forefoot most prominently medially. There are areas of soft tissue ulceration seen as well.      DX-ANKLE 3+ VIEWS LEFT   Final Result      1.  There is diffuse swelling of the left ankle and hindfoot with no underlying osteomyelitis.      DX-FOOT-COMPLETE 3+ LEFT   Final Result      1.  There is diffuse swelling of the left foot predominantly adjacent to the 1st metatarsal with soft tissue air lucencies suggestive of cellulitis or potentially abscess.   2.  No radiographic evidence of osteomyelitis.      DX-CHEST-PORTABLE (1 VIEW)   Final Result      1.  There is no acute cardiopulmonary process.           Assessment/Plan  * Abscess of left foot- (present on admission)  Assessment & Plan  Chronic DM ulcer which was healing with wound care, then he developed pain, swelling, fever  Imaging showed abscess formation with gas suspicious for necrotizing fasciitis s/p I&D by Dr. Ponce on 7/12  Cultures with MSSA  MRI shows osteomyelitis-patient declined any further orthopedic surgery  He will need 6 wks IV abx  Discussed with ID  Continue Unasyn  DC  vancomycin  Pain is controlled, continue as needed Tylenol  Strict diabetic control    Bacteremia  Assessment & Plan  Real versus contaminant  Strep sp + in both cultures from 7/12  Repeat blood cultures this morning  Will need negative cultures for 48 hours prior to placement of PICC    Hypophosphatemia  Assessment & Plan  Replace p.o.  Repeat in the morning    Acute osteomyelitis of left foot (HCC)  Assessment & Plan  Due to infected diabetic ulcer/abscess status post washout on 7/12  Cultures are growing MSSA  Patient declining orthopedic intervention at this time and would like to opt for nonoperative treatment despite the increased risk of recurrent infection  I discussed with ID -anticipate 6 weeks of IV antibiotic therapy  Discontinue vancomycin  Continue Unasyn  Repeat blood cultures, will need to be negative x48 hours prior to placing PICC line  Heel weightbearing    Normochromic normocytic anemia  Assessment & Plan  Monitor H&H if drops below 7 or 21 transfuse  Chronic disease and pancytopenia from cirrhosis contributing  No e/o bleeding  Monitor    Metabolic acidosis  Assessment & Plan  Metabolic acidosis most likely secondary to early sepsis and lactic acidosis  Improved with fluid bolus    Diabetes mellitus type II, controlled (HCC)  Assessment & Plan  Sugars are not at goal despite A1C of 5.8, neuropathy from DM contributing   Added lantus 6U, with improved control   continue sliding scale  -diabetic diet    Hyponatremia- (present on admission)  Assessment & Plan  Most likely secondary to the infection and hyperglycemia  Improved with fluids  A1c has increased slightly but is still in the nondiabetic range at 5.8  DC NS    Portal venous hypertension (HCC)- (present on admission)  Assessment & Plan  History of portal hypertension  Continue blood pressure management using beta-blockers prn    Alcoholic hepatitis with ascites- (present on admission)  Assessment & Plan  History of alcoholic hepatitis and  he has been in remission for 2-1/2 years  He demonstrates pancytopenia, chronic and stable  Continue to monitor         VTE prophylaxis: SCDs/TEDs, no pharm ppx until surgical plan is finalized    I have performed a physical exam and reviewed and updated ROS and Plan today (7/14/2023). In review of yesterday's note (7/13/2023), there are no changes except as documented above.    Total time:  54 minutes.  I spent greater than 50% of the time for patient care, counseling, and coordination on this date, including unit/floor time, and face-to-face time with the patient as per interval events and assessment and plan above

## 2023-07-14 NOTE — PROGRESS NOTES
0747 Called for breakfast tray.    0830 Follow up for breakfast tray. Snacks given to patient in the meantime.    0900 2nd follow up for breakfast tray. Walked over to kitchen and asked for breakfast tray.

## 2023-07-14 NOTE — CONSULTS
Consults  INFECTIOUS DISEASES INPATIENT CONSULT NOTE     Date of Service: 7/14/2023    Consult Requested By: Zabrina Nina D.O.    Reason for Consultation: Bacteremia and foot infection     History of Present Illness:   Esequiel Molina is a 33 y.o.  admitted 7/12/2023. Pt has a past medical history of DMT2, alcoholic cirrhosis and sober x2 years per notes, admitted with left foot pain, erythema and drainage.  Febrile to 103.4 on arrival.  MRI left foot with limited study due to motion artifact, some marrow enhancement the first metatarsal and first proximal likely representing osteomyelitis, extensive cellulitis of the great toe with extension into the midfoot.  Orthopedic surgery were consulted and the patient went to the OR.  MRSA nares negative.    Review Of Systems:  Review of Systems   Constitutional:  Positive for malaise/fatigue. Negative for chills and fever.   HENT:  Negative for hearing loss.    Eyes:  Negative for blurred vision.   Respiratory:  Negative for cough, sputum production and shortness of breath.    Cardiovascular:  Positive for leg swelling. Negative for chest pain.   Gastrointestinal:  Negative for abdominal pain, constipation, diarrhea, nausea and vomiting.   Genitourinary:  Negative for dysuria.   Musculoskeletal:  Negative for myalgias.   Skin:  Negative for rash.   Neurological:  Negative for weakness.   Psychiatric/Behavioral:  The patient is not nervous/anxious.        PMH:   Past Medical History:   Diagnosis Date    Acute osteomyelitis of left foot (HCC) 7/13/2023    Anxiety     Depression     Diabetes (HCC)     GERD (gastroesophageal reflux disease)     History of chickenpox     Indigestion     Jaundice     Liver disease     Urinary incontinence     bowel incontnence        PSH:  Past Surgical History:   Procedure Laterality Date    INCISION AND DRAINAGE ORTHOPEDIC Left 7/12/2023    Procedure: INCISION AND DRAINAGE foot;  Surgeon: Michael Ponce M.D.;  Location: SURGERY [facility]  [place];  Service: Orthopedics       FAMILY HX:  History reviewed. No pertinent family history.  Reviewed family history. No pertinent family history.     SOCIAL HX:  Social History     Socioeconomic History    Marital status: Single     Spouse name: Not on file    Number of children: Not on file    Years of education: Not on file    Highest education level: Not on file   Occupational History    Not on file   Tobacco Use    Smoking status: Former     Types: Cigarettes    Smokeless tobacco: Current     Types: Chew   Vaping Use    Vaping Use: Former    Start date: 1/11/2017    Quit date: 1/11/2020    Substances: Nicotine, THC    Devices: Pre-filled pod   Substance and Sexual Activity    Alcohol use: Not Currently    Drug use: Yes     Types: Marijuana, Inhaled, Oral     Comment: marijuana    Sexual activity: Not Currently   Other Topics Concern    Not on file   Social History Narrative    Not on file     Social Determinants of Health     Financial Resource Strain: Not on file   Food Insecurity: Not on file   Transportation Needs: Not on file   Physical Activity: Not on file   Stress: Not on file   Social Connections: Not on file   Intimate Partner Violence: Not on file   Housing Stability: Not on file     Social History     Tobacco Use   Smoking Status Former    Types: Cigarettes   Smokeless Tobacco Current    Types: Chew     Social History     Substance and Sexual Activity   Alcohol Use Not Currently       Allergies/Intolerances:  No Known Allergies    History reviewed with the patient and /or family member, chart & primary care team    Other Current Medications:    Current Facility-Administered Medications:     phosphorus (K-Phos-Neutral) per tablet 500 mg, 2 Tablet, Oral, BID, Zabrina Nina, D.O., 500 mg at 07/14/23 0759    insulin GLARGINE (Lantus,Semglee) injection, 6 Units, Subcutaneous, QAM INSULIN, HEMANTH LamO., 6 Units at 07/14/23 0605    NS infusion, , Intravenous, Continuous, Zabrina Nina,  D.O., Last Rate: 75 mL/hr at 07/14/23 0443, New Bag at 07/14/23 0443    senna-docusate (Pericolace Or Senokot S) 8.6-50 MG per tablet 2 Tablet, 2 Tablet, Oral, BID **AND** polyethylene glycol/lytes (Miralax) PACKET 1 Packet, 1 Packet, Oral, QDAY PRN **AND** magnesium hydroxide (Milk Of Magnesia) suspension 30 mL, 30 mL, Oral, QDAY PRN **AND** bisacodyl (Dulcolax) suppository 10 mg, 10 mg, Rectal, QDAY PRN, Michael Al M.D.    lactated ringers infusion (BOLUS), 500 mL, Intravenous, Once PRN, Michael Al M.D.    acetaminophen (Tylenol) tablet 650 mg, 650 mg, Oral, Q6HRS PRN, Michael Al M.D., 650 mg at 07/12/23 2126    Notify provider if pain remains uncontrolled, , , CONTINUOUS **AND** Use the Numeric Rating Scale (NRS), Zapata-Baker Faces (WBF), or FLACC on regular floors and Critical-Care Pain Observation Tool (CPOT) on ICUs/Trauma to assess pain, , , CONTINUOUS **AND** Pulse Ox, , , CONTINUOUS **AND** Pharmacy Consult Request ...Pain Management Review 1 Each, 1 Each, Other, PHARMACY TO DOSE **AND** If patient difficult to arouse and/or has respiratory depression (respiratory rate of 10 or less), stop any opiates that are currently infusing and call a Rapid Response., , , CONTINUOUS, Michael Al M.D.    oxyCODONE immediate-release (Roxicodone) tablet 2.5 mg, 2.5 mg, Oral, Q3HRS PRN **OR** oxyCODONE immediate-release (Roxicodone) tablet 5 mg, 5 mg, Oral, Q3HRS PRN **OR** morphine 4 MG/ML injection 2 mg, 2 mg, Intravenous, Q3HRS PRN, Michael Al M.D.    ampicillin/sulbactam (Unasyn) 3 g in  mL IVPB, 3 g, Intravenous, Q6HRS, Michael Al M.D., Stopped at 07/14/23 1236    labetalol (Normodyne/Trandate) injection 10 mg, 10 mg, Intravenous, Q4HRS PRN, Michael Al M.D.    ondansetron (Zofran) syringe/vial injection 4 mg, 4 mg, Intravenous, Q4HRS PRN, Michael Al M.D.    ondansetron (Zofran ODT) dispertab 4 mg, 4 mg, Oral, Q4HRS PRN, Michael Al M.D.    promethazine (Phenergan) tablet 12.5-25  "mg, 12.5-25 mg, Oral, Q4HRS PRN, Mihcael Al M.D.    promethazine (Phenergan) suppository 12.5-25 mg, 12.5-25 mg, Rectal, Q4HRS PRN, Michael Al M.D.    prochlorperazine (Compazine) injection 5-10 mg, 5-10 mg, Intravenous, Q4HRS PRN, Michael Al M.D.    insulin regular (HumuLIN R,NovoLIN R) injection, 3-14 Units, Subcutaneous, 4X/DAY ACHS, 4 Units at 23 2107 **AND** POC blood glucose manual result, , , Q AC AND BEDTIME(S) **AND** NOTIFY MD and PharmD, , , Once **AND** Administer 20 grams of glucose (approximately 8 ounces of fruit juice) every 15 minutes PRN FSBG less than 70 mg/dL, , , PRN **AND** dextrose 10 % BOLUS 25 g, 25 g, Intravenous, Q15 MIN PRN, Michael Al M.D.  [unfilled]    Most Recent Vital Signs:  /67   Pulse 75   Temp 36.6 °C (97.9 °F) (Oral)   Resp 16   Ht 1.956 m (6' 5.01\")   Wt 94.4 kg (208 lb 1.8 oz)   SpO2 98%   BMI 24.67 kg/m²   Temp  Av.4 °C (99.3 °F)  Min: 35.6 °C (96.1 °F)  Max: 39.7 °C (103.4 °F)    Physical Exam:  Physical Exam  Constitutional:       Appearance: Normal appearance.   HENT:      Head: Normocephalic and atraumatic.      Right Ear: External ear normal.      Left Ear: External ear normal.      Nose: Nose normal.      Mouth/Throat:      Mouth: Mucous membranes are moist.      Pharynx: Oropharynx is clear.   Eyes:      Extraocular Movements: Extraocular movements intact.      Conjunctiva/sclera: Conjunctivae normal.      Pupils: Pupils are equal, round, and reactive to light.   Cardiovascular:      Rate and Rhythm: Normal rate and regular rhythm.      Heart sounds: Normal heart sounds.   Pulmonary:      Effort: Pulmonary effort is normal.      Breath sounds: Normal breath sounds.   Abdominal:      General: Abdomen is flat. Bowel sounds are normal.      Palpations: Abdomen is soft.   Musculoskeletal:         General: Tenderness and signs of injury present.      Cervical back: Normal range of motion and neck supple.      Left lower leg: " Edema present.   Skin:     General: Skin is warm and dry.   Neurological:      General: No focal deficit present.      Mental Status: He is alert and oriented to person, place, and time.   Psychiatric:         Mood and Affect: Mood normal.         Behavior: Behavior normal.           Pertinent Lab Results:  Recent Labs     07/12/23  1557 07/13/23  0009 07/14/23  0346   WBC 10.8 9.3 7.0      Recent Labs     07/12/23  1557 07/13/23  0009 07/14/23  0346   HEMOGLOBIN 13.4* 10.9* 10.4*   HEMATOCRIT 39.0* 31.9* 30.7*   MCV 83.3 84.8 83.9   MCH 28.6 29.0 28.4   PLATELETCT 94* 79* 85*         Recent Labs     07/12/23  1557 07/13/23 0009 07/14/23 0346   SODIUM 123* 123* 136   POTASSIUM 3.9 3.8 3.4*   CHLORIDE 84* 88* 102   CO2 21 20 22   CREATININE 0.94 0.93 0.64        Recent Labs     07/12/23 1557 07/14/23 0346   ALBUMIN 4.0 3.0*        Pertinent Micro:  Results       Procedure Component Value Units Date/Time    Fungal Culture [[id number]] Collected: 07/12/23 1938    Order Status: Completed Specimen: Wound Updated: 07/14/23 1434     Significant Indicator NEG     Source WND     Site left foot #2     Culture Result Culture in progress.     Fungal Smear Results No fungal elements seen.    Narrative:      2 left foot swab #2  Surgery - swabs received    Anaerobic Culture [[id number]] Collected: 07/12/23 1938    Order Status: Completed Specimen: Wound Updated: 07/14/23 1434     Significant Indicator NEG     Source WND     Site left foot #2     Culture Result Culture in progress.    Narrative:      2 left foot swab #2  Surgery - swabs received    CULTURE WOUND W/ GRAM STAIN [[id number]]  (Abnormal) Collected: 07/12/23 1938    Order Status: No result Specimen: Wound Updated: 07/14/23 1434     Significant Indicator NEG     Source WND     Site left foot #2     Culture Result -     Gram Stain Result Many WBCs.  Moderate Gram positive cocci.      Narrative:      2 left foot swab #2  Surgery - swabs received    Fungal Culture  [[id number]] Collected: 07/12/23 1845    Order Status: Completed Specimen: Wound Updated: 07/14/23 1434     Significant Indicator NEG     Source WND     Site left foot     Culture Result Culture in progress.     Fungal Smear Results No fungal elements seen.    Narrative:      1 left foot swab #1  Surgery - swabs received    Anaerobic Culture [[id number]] Collected: 07/12/23 1845    Order Status: Completed Specimen: Wound Updated: 07/14/23 1434     Significant Indicator NEG     Source WND     Site left foot     Culture Result Culture in progress.    Narrative:      1 left foot swab #1  Surgery - swabs received    CULTURE WOUND W/ GRAM STAIN [[id number]]  (Abnormal) Collected: 07/12/23 1845    Order Status: No result Specimen: Wound Updated: 07/14/23 1434     Significant Indicator NEG     Source WND     Site left foot     Culture Result -     Gram Stain Result Many WBCs.  Many Gram positive cocci.      Narrative:      1 left foot swab #1  Surgery - swabs received    Culture Wound W/ Gram Stain [[id number]]  (Abnormal)  (Susceptibility) Collected: 07/12/23 1841    Order Status: Completed Specimen: Wound from Left Foot Updated: 07/14/23 1210     Significant Indicator POS     Source WND     Site LEFT FOOT     Culture Result Heavy growth usual skin ramonita.     Gram Stain Result Many WBCs.  Many mixed bacteria, no predominant organism seen.       Culture Result Staphylococcus aureus  Moderate growth  Methicillin sensitive by screening method      Susceptibility       Staphylococcus aureus (1)       Antibiotic Interpretation Microscan   Method Status    Azithromycin Sensitive <=2 mcg/mL CONCETTA Final    Clindamycin Sensitive 0.5 mcg/mL CONCETTA Final    Cefazolin Sensitive <=8 mcg/mL CONCETTA Final    Cefepime Sensitive <=4 mcg/mL CONCETTA Final    Ceftaroline Sensitive <=0.5 mcg/mL CONCETTA Final    Daptomycin Sensitive 1 mcg/mL CONCETTA Final    Erythromycin Sensitive <=0.25 mcg/mL CONCETTA Final    Ampicillin/sulbactam Sensitive <=8/4 mcg/mL CONCETTA Final     "Vancomycin Sensitive 1 mcg/mL CONCETTA Final    Oxacillin Sensitive <=0.25 mcg/mL CONCETTA Final    Pip/Tazobactam Sensitive <=8 mcg/mL CONCETTA Final    Trimeth/Sulfa Sensitive <=0.5/9.5 mcg/mL CONCETTA Final    Tetracycline Sensitive <=4 mcg/mL CONCETTA Final                       BLOOD CULTURE [[id number]] Collected: 07/14/23 0801    Order Status: Sent Specimen: Blood from Peripheral Updated: 07/14/23 0810    Narrative:      Per Hospital Policy: Only change Specimen Src: to \"Line\" if  specified by physician order.    BLOOD CULTURE [[id number]] Collected: 07/14/23 0801    Order Status: Sent Specimen: Blood from Peripheral Updated: 07/14/23 0809    Narrative:      Per Hospital Policy: Only change Specimen Src: to \"Line\" if  specified by physician order.    Blood Culture [[id number]]  (Abnormal) Collected: 07/12/23 1557    Order Status: Completed Specimen: Blood from Peripheral Updated: 07/14/23 0049     Significant Indicator POS     Source BLD     Site PERIPHERAL     Culture Result Growth detected by Bactec instrument. 07/14/2023  00:46  Gram Stain: Gram positive cocci: Possible Streptococcus sp.      Narrative:      Per Hospital Policy: Only change Specimen Src: to \"Line\" if  specified by physician order.  Left AC    GRAM STAIN [[id number]]  (Abnormal) Collected: 07/12/23 1938    Order Status: Completed Specimen: Wound Updated: 07/13/23 2037     Significant Indicator .     Source WND     Site left foot #2     Gram Stain Result Many WBCs.  Moderate Gram positive cocci.      Narrative:      2 left foot swab #2  Surgery - swabs received    Fungal Smear [[id number]] Collected: 07/12/23 1938    Order Status: Completed Specimen: Wound Updated: 07/13/23 2037     Significant Indicator NEG     Source WND     Site left foot #2     Fungal Smear Results No fungal elements seen.    Narrative:      2 left foot swab #2  Surgery - swabs received    GRAM STAIN [[id number]] Collected: 07/12/23 1845    Order Status: Completed Specimen: Wound Updated: 07/13/23 " "2036     Significant Indicator .     Source WND     Site left foot     Gram Stain Result Many WBCs.  Many Gram positive cocci.      Narrative:      1 left foot swab #1  Surgery - swabs received    Fungal Smear [[id number]] Collected: 07/12/23 1845    Order Status: Completed Specimen: Wound Updated: 07/13/23 2036     Significant Indicator NEG     Source WND     Site left foot     Fungal Smear Results No fungal elements seen.    Narrative:      1 left foot swab #1  Surgery - swabs received    Blood Culture [[id number]]  (Abnormal) Collected: 07/12/23 1614    Order Status: Completed Specimen: Blood from Peripheral Updated: 07/13/23 1826     Significant Indicator POS     Source BLD     Site PERIPHERAL     Culture Result Growth detected by Bactec instrument. 07/13/2023  18:24  Gram Stain: Gram positive cocci: Possible Streptococcus sp.      Narrative:      CALL  [facility] tel. [phone],  CALLED  [facility] tel. [phone] 07/13/2023, 18:26, RB PERF. RESULTS CALLED TO:  MALINA Coronado [id number]  Per Hospital Policy: Only change Specimen Src: to \"Line\" if  specified by physician order.  Right Forearm/Arm    MRSA By PCR (Amp) [[id number]] Collected: 07/13/23 0718    Order Status: Completed Specimen: Respirate from Nares Updated: 07/13/23 1549     MRSA by PCR Negative    Narrative:      Collected By: [id number] ROBERT BHANU WHITMAN    GRAM STAIN [[id number]] Collected: 07/12/23 1841    Order Status: Completed Specimen: Wound Updated: 07/13/23 0700     Significant Indicator .     Source WND     Site LEFT FOOT     Gram Stain Result Many WBCs.  Many mixed bacteria, no predominant organism seen.      AFB Culture [[id number]] Collected: 07/12/23 1938    Order Status: Canceled Specimen: Other     AFB Culture [[id number]] Collected: 07/12/23 1845    Order Status: Canceled Specimen: Other     Urine Culture (New) [[id number]] Collected: 07/12/23 1550    Order Status: Sent Specimen: Urine Updated: 07/12/23 1622    Narrative:      Indication for " culture:->Evaluation for sepsis without a  clear source of infection    Urinalysis [[id number]]  (Abnormal) Collected: 07/12/23 1550    Order Status: Completed Specimen: Blood Updated: 07/12/23 1620     Color Yellow     Character Hazy     Specific Gravity 1.025     Ph 5.5     Glucose 100 mg/dL      Ketones Negative mg/dL      Protein 100 mg/dL      Bilirubin Moderate     Nitrite Positive     Leukocyte Esterase Negative     Occult Blood Trace     Micro Urine Req Microscopic    Narrative:      Indication for culture:->Evaluation for sepsis without a  clear source of infection          No results found for: BLOODCULTU, BLDCULT, BCHOLD     Studies:  MR-FOOT-WITH & W/O LEFT    Result Date: 7/13/2023 7/13/2023 12:56 PM HISTORY/REASON FOR EXAM: Open wound left foot with foot infection. TECHNIQUE/EXAM DESCRIPTION: MRI of the LEFT foot with contrast. Using a [employer] Signa 1.5 Gabriella MRI scanner, T1 axial, sagittal, and coronal, fast spin-echo T2 fat-suppressed axial and coronal, fast inversion recovery sagittal, and T1 fat suppressed axial and sagittal post intravenous contrast images were obtained. A total of mL ProHance given. COMPARISON: None. FINDINGS: Examination is limited due to patient motion artifact and extensive failed fat saturation. Osseous structures/Cartilaginous surfaces: There is limited evaluation of the osseous structures due to motion artifact and failed fat saturation. There is some faint marrow edema and enhancement of the first metatarsal as well as the proximal phalanx. No evidence of bony destructive change. Miscellaneous: There is extensive soft tissue edema and enhancement of the mid and forefoot most prominently surrounding the great toe and involving the medial forefoot. There is also soft tissue ulceration involving the great toe in underlying the first  MTP joint. No focal abscess.     1.  Limited study due to extensive patient motion artifact and failed fat saturation. 2.  Some marrow edema and  enhancement of the first metatarsal and the first proximal phalanx likely representing osteomyelitis. 3.  Extensive cellulitis involving the great toe with extension into the mid and forefoot most prominently medially. There are areas of soft tissue ulceration seen as well.    DX-FOOT-COMPLETE 3+ LEFT    Result Date: 7/12/2023  7/12/2023 3:58 PM HISTORY/REASON FOR EXAM:  Ulcer on the ball of the foot for several months recently progressing. TECHNIQUE/EXAM DESCRIPTION AND NUMBER OF VIEWS: 3 views of the LEFT foot. COMPARISON:  None FINDINGS: There is diffuse soft tissue swelling of the left ankle and foot. There are multiple small air lucencies in the soft tissues over the dorsum and medial aspect of the left foot adjacent to the 1st digit. Possible one small air lucency anterior to the ankle. No radiopaque foreign body. No periostitis to suggest osteomyelitis. There is no evidence of displaced fracture or dislocation. The alignment is maintained. Minimal degenerative change in the tarsometatarsal junctions and 1st MTP joint.     1.  There is diffuse swelling of the left foot predominantly adjacent to the 1st metatarsal with soft tissue air lucencies suggestive of cellulitis or potentially abscess. 2.  No radiographic evidence of osteomyelitis.    DX-ANKLE 3+ VIEWS LEFT    Result Date: 7/12/2023  7/12/2023 3:58 PM HISTORY/REASON FOR EXAM:  Ulcer on the ball of the left foot for several months progressing over the past 2 days. TECHNIQUE/EXAM DESCRIPTION AND NUMBER OF VIEWS:  3 views of the LEFT ankle. COMPARISON: None FINDINGS: There is diffuse swelling of the left ankle and hindfoot. There is no soft tissue gas evident radiographically. No foreign body. There is no displaced fracture or dislocation. The alignment of the ankle is within normal limits. There is degenerative change in the dorsal tarsometatarsal junctions.     1.  There is diffuse swelling of the left ankle and hindfoot with no underlying  osteomyelitis.    DX-CHEST-PORTABLE (1 VIEW)    Result Date: 7/12/2023 7/12/2023 3:54 PM HISTORY/REASON FOR EXAM:  Ulcer of the left foot. Possible sepsis. TECHNIQUE/EXAM DESCRIPTION AND NUMBER OF VIEWS: Single portable view of the chest. COMPARISON: 10/31/2014 FINDINGS: The mediastinal and cardiac silhouette is unremarkable. The pulmonary vascularity is within normal limits. The lung parenchyma is clear. There is no significant pleural effusion. There is no visible pneumothorax. There are no acute bony abnormalities.     1.  There is no acute cardiopulmonary process.      ASSESSMENT/PLAN:     33 y.o.  admitted 7/12/2023. Pt has a past medical history of DMT2, alcoholic cirrhosis and sober x2 years per notes, admitted with left foot pain, erythema and drainage.  Febrile to 103.4 on arrival.  MRI left foot w/ osteomyelitis, extensive cellulitis of the great toe with extension into the midfoot.  Orthopedic surgery were consulted and the patient went to the OR.  MRSA nares negative.      Problem List    Bacteremia,  secondary to foot infection  -Blood cultures on 7/12 + possible Streptococcus species (confirmed Gram stain with microbiology department)   Cellulitis of left great toe and midfoot, abscess  Osteomyelitis per MRI of first metatarsal and first proximal phalanx  -OR on 7/12 for I&D with drainage of abscess from plantar and dorsal aspects of the left foot.  Per op note very foul odor and significant purulence, cultures were obtained.  -Wound cultures on 7/12 +MSSA  - OR cultures from 7/12 +GPCs   Diabetes mellitus  Alcoholic cirrhosis  History of alcohol abuse     Assessment:      -Notes were reviewed from primary team, radiology, surgery, microbiology, nursing etc.  -Reviewed labs to date, microbiology for current admit and prior  -Imaging was independently reviewed and interpreted    Plan:  ID recommends ongoing hospitalization due to high risk of of morbidity for the above problems.   Recommendations for  antibiotics:   Continue Unasyn 3 g every 6 hours, stopped vancomycin  Recommendations for further/ongoing work-up:   Follow-up OR cultures  Follow-up blood cultures  Recommendations for monitoring of clinical status and drug toxicity:   Monitor labs, monitor and control blood sugar  Monitor wound and surgical site, drains in place, still with significant edema and erythema, prognosis for limb salvage guarded  Recommendations for intervention:   Status post OR, potentially will need additional procedures and amputation has been discussed with the patient but he prefers to try and salvage the foot      Dispo: Awaiting culture results and work-up as above   PICC: TBD      Plan of care discussed with IM Zabrina Nina D.O.. Will continue to follow    Zabrina Cisse M.D.

## 2023-07-14 NOTE — PROGRESS NOTES
2200H Pt awake, watching tv, O2 sat 94%    2321H Pt resting in bed, med pass    0200H Pt resting in bed,eyes closed, respirations even and unlabored, arouses easily    0335H Pt resting in bed,eyes closed, respirations even and unlabored, arouses easily    0607H Med pass, RN at bedside    0712H Bedside report given

## 2023-07-14 NOTE — PROGRESS NOTES
0050H Received a critical lab value, anaerobic culture gram + cocci in chain, possible Strep    0059H Informed MD Dr Meyer no new order made

## 2023-07-14 NOTE — DIETARY
Nutrition Services: Diabetes Diet Education Consult   Day 2 of admit.  Esequiel Molina is a 33 y.o. male with admitting DX of Abscess of left foot [L02.612]    Consult received to provide diabetes diet education. Pt with A1c 5.8% indicating good glycemic control. RD visited pt at bedside; pt politely declined diet education, reports he is actively managing diabetes. RD answered pt's questions.    Please re-consult RD as indicated.

## 2023-07-14 NOTE — ASSESSMENT & PLAN NOTE
Due to infected diabetic ulcer with underlying abscess status post washout on 7/12  Cultures are growing MSSA/polymicrobial  Patient declining orthopedic intervention at this time and would like to opt for nonoperative treatment despite the increased risk of recurrent infection  I discussed with ID -anticipate 6 weeks of IV antibiotic therapy throug 8/25  Continue Unasyn + Dapto here, plan for Ertapenem at discharge  Has PICC will get [facility] infusions- pending funding coverage/estimate  Heel weightbearing  Ordered OP wound care, pt will pay out of pocket

## 2023-07-14 NOTE — PROGRESS NOTES
0800 Sitting up in bed. RR 18.    0900 Sitting up in bed. RR 18    1000 Sitting up in sofa with family.    1100 Talking with family    1200 Eating lunch. RR 18    1300 at MRI    1400 at MRI    1500 Sitting up in bed. RR 17. Family at bedside.    1600 Sitting up in bed. RR 18.    1700 Sitting up in bed. Eating dinner.

## 2023-07-14 NOTE — CARE PLAN
The patient is Stable - Low risk of patient condition declining or worsening    Shift Goals  Clinical Goals: monitor dressing, sleep at least 8 hours  Patient Goals: sleep at least 8 hours, no surgery tomorrow  Family Goals: n/a    Progress made toward(s) clinical / shift goals:  Dressing kept monitored. Pt seen on his cellphone during rounds. Call light within reach. Fall precautions in placed. Hourly rounding in progress.    Patient is not progressing towards the following goals:  Pt instructed to keep NPO as ordered for possible surgery, pt refused as he doesn't want any amputation. This is informed to MD Dr Meyer no new order made.

## 2023-07-14 NOTE — PROGRESS NOTES
Received bedside report from night RN. Patient alert and oriented x4. Denies any complains at this time. Discussed plan of care and understood. Assessment per [facility]. Falls precaution in place. Call light placed within reach. Will continue to monitor. Changed and reinforced outer dressing to left foot.

## 2023-07-14 NOTE — CARE PLAN
"The patient is Stable - Low risk of patient condition declining or worsening    Shift Goals  Clinical Goals: pain control 3/10 or less; monitor wound; monitor lactic acid;  Patient Goals: \"for the wound to be better\"  Family Goals: n/a    Progress made toward(s) clinical / shift goals:  pain controlled 3/10     Patient is not progressing towards the following goals:  MRI showed possible bone infection      Problem: Infection - Standard  Goal: Patient will remain free from infection  7/13/2023 1824 by Cliff Leon, R.N.  Outcome: Not Progressing  7/13/2023 1824 by Cliff Leon, R.N.  Outcome: Progressing     "

## 2023-07-14 NOTE — PROGRESS NOTES
"Subjective:    Feeling much better this morning.  He had his MRI completed yesterday.  Not interested in any sort of further surgical intervention at this time.    Objective:  /69   Pulse 67   Temp 36.9 °C (98.4 °F) (Oral)   Resp 16   Ht 1.956 m (6' 5.01\")   Wt 94.4 kg (208 lb 1.8 oz)   SpO2 97%     Recent Labs     07/12/23  1557 07/13/23  0009 07/14/23  0346   WBC 10.8 9.3 7.0   RBC 4.68* 3.76* 3.66*   HEMOGLOBIN 13.4* 10.9* 10.4*   HEMATOCRIT 39.0* 31.9* 30.7*   MCV 83.3 84.8 83.9   MCH 28.6 29.0 28.4   MCHC 34.4 34.2 33.9   RDW 38.4 39.1 40.2   PLATELETCT 94* 79* 85*   MPV 11.9 12.6 12.6     Recent Labs     07/12/23  1557 07/13/23  0009 07/14/23  0346   SODIUM 123* 123* 136   POTASSIUM 3.9 3.8 3.4*   CHLORIDE 84* 88* 102   CO2 21 20 22   GLUCOSE 209* 363* 151*   BUN 10 13 13   CREATININE 0.94 0.93 0.64   CALCIUM 8.8 7.9* 8.1*         Intake/Output Summary (Last 24 hours) at 7/14/2023 0654  Last data filed at 7/14/2023 0600  Gross per 24 hour   Intake 1380 ml   Output --   Net 1380 ml       Comfortable, no distress  Dressing intact      Assessment:  Left forefoot infection with associated osteomyelitis with first ray    Plan:    Mixed he is declining surgical options at this time.  Discussed the pros and cons and risks and benefits of nonoperative treatment.  We will turn over his care to wound care at this point.    "

## 2023-07-14 NOTE — CARE PLAN
The patient is Stable - Low risk of patient condition declining or worsening    Shift Goals  Clinical Goals: Follow up with wound care; Monitor wound; Monitor labs  Patient Goals: Wound care  Family Goals: n/a    Progress made toward(s) clinical / shift goals: Pain controlled 3/10 or less; Seen by wound care;    Patient is not progressing towards the following goals:

## 2023-07-14 NOTE — PROGRESS NOTES
Rounding     1909 Introduction, patient on phone   1913 Ipad, ice water   2100 Patient talking on phone   2245 Vitals, shower, linen change   0110 Patient sleeping   0310 Patient on his phone, no needs at this time   0426 Vitals check   0610 Patient sitting up in bed, no needs at this time

## 2023-07-15 LAB
ALBUMIN SERPL BCP-MCNC: 2.8 G/DL (ref 3.2–4.9)
BACTERIA UR CULT: NORMAL
BUN SERPL-MCNC: 10 MG/DL (ref 8–22)
CALCIUM ALBUM COR SERPL-MCNC: 8.8 MG/DL (ref 8.5–10.5)
CALCIUM SERPL-MCNC: 7.8 MG/DL (ref 8.4–10.2)
CHLORIDE SERPL-SCNC: 101 MMOL/L (ref 96–112)
CO2 SERPL-SCNC: 22 MMOL/L (ref 20–33)
CREAT SERPL-MCNC: 0.62 MG/DL (ref 0.5–1.4)
GFR SERPLBLD CREATININE-BSD FMLA CKD-EPI: 129 ML/MIN/1.73 M 2
GLUCOSE BLD STRIP.AUTO-MCNC: 115 MG/DL (ref 65–99)
GLUCOSE BLD STRIP.AUTO-MCNC: 125 MG/DL (ref 65–99)
GLUCOSE BLD STRIP.AUTO-MCNC: 132 MG/DL (ref 65–99)
GLUCOSE BLD STRIP.AUTO-MCNC: 155 MG/DL (ref 65–99)
GLUCOSE SERPL-MCNC: 162 MG/DL (ref 65–99)
MAGNESIUM SERPL-MCNC: 1.9 MG/DL (ref 1.5–2.5)
PHOSPHATE SERPL-MCNC: 3.1 MG/DL (ref 2.5–4.5)
POTASSIUM SERPL-SCNC: 3.4 MMOL/L (ref 3.6–5.5)
SIGNIFICANT IND 70042: NORMAL
SITE SITE: NORMAL
SODIUM SERPL-SCNC: 136 MMOL/L (ref 135–145)
SOURCE SOURCE: NORMAL

## 2023-07-15 PROCEDURE — 99233 SBSQ HOSP IP/OBS HIGH 50: CPT | Performed by: INTERNAL MEDICINE

## 2023-07-15 PROCEDURE — 700105 HCHG RX REV CODE 258: Performed by: HOSPITALIST

## 2023-07-15 PROCEDURE — 94760 N-INVAS EAR/PLS OXIMETRY 1: CPT

## 2023-07-15 PROCEDURE — 83735 ASSAY OF MAGNESIUM: CPT

## 2023-07-15 PROCEDURE — 36415 COLL VENOUS BLD VENIPUNCTURE: CPT

## 2023-07-15 PROCEDURE — 700111 HCHG RX REV CODE 636 W/ 250 OVERRIDE (IP): Mod: JZ | Performed by: INTERNAL MEDICINE

## 2023-07-15 PROCEDURE — 82962 GLUCOSE BLOOD TEST: CPT

## 2023-07-15 PROCEDURE — 770001 HCHG ROOM/CARE - MED/SURG/GYN PRIV*

## 2023-07-15 PROCEDURE — 700102 HCHG RX REV CODE 250 W/ 637 OVERRIDE(OP): Performed by: INTERNAL MEDICINE

## 2023-07-15 PROCEDURE — 700105 HCHG RX REV CODE 258: Performed by: INTERNAL MEDICINE

## 2023-07-15 PROCEDURE — 80069 RENAL FUNCTION PANEL: CPT

## 2023-07-15 PROCEDURE — 700111 HCHG RX REV CODE 636 W/ 250 OVERRIDE (IP): Mod: JZ | Performed by: HOSPITALIST

## 2023-07-15 PROCEDURE — A9270 NON-COVERED ITEM OR SERVICE: HCPCS | Performed by: INTERNAL MEDICINE

## 2023-07-15 RX ORDER — POTASSIUM CHLORIDE 20 MEQ/1
40 TABLET, EXTENDED RELEASE ORAL ONCE
Status: COMPLETED | OUTPATIENT
Start: 2023-07-15 | End: 2023-07-15

## 2023-07-15 RX ORDER — MAGNESIUM SULFATE HEPTAHYDRATE 40 MG/ML
2 INJECTION, SOLUTION INTRAVENOUS ONCE
Status: COMPLETED | OUTPATIENT
Start: 2023-07-15 | End: 2023-07-15

## 2023-07-15 RX ADMIN — DIBASIC SODIUM PHOSPHATE, MONOBASIC POTASSIUM PHOSPHATE AND MONOBASIC SODIUM PHOSPHATE 500 MG: 852; 155; 130 TABLET ORAL at 17:47

## 2023-07-15 RX ADMIN — INSULIN GLARGINE-YFGN 6 UNITS: 100 INJECTION, SOLUTION SUBCUTANEOUS at 05:18

## 2023-07-15 RX ADMIN — DAPTOMYCIN 570 MG: 500 INJECTION, POWDER, LYOPHILIZED, FOR SOLUTION INTRAVENOUS at 14:16

## 2023-07-15 RX ADMIN — AMPICILLIN SODIUM AND SULBACTAM SODIUM 3 G: 2; 1 INJECTION, POWDER, FOR SOLUTION INTRAMUSCULAR; INTRAVENOUS at 17:50

## 2023-07-15 RX ADMIN — AMPICILLIN SODIUM AND SULBACTAM SODIUM 3 G: 2; 1 INJECTION, POWDER, FOR SOLUTION INTRAMUSCULAR; INTRAVENOUS at 23:38

## 2023-07-15 RX ADMIN — AMPICILLIN SODIUM AND SULBACTAM SODIUM 3 G: 2; 1 INJECTION, POWDER, FOR SOLUTION INTRAMUSCULAR; INTRAVENOUS at 00:02

## 2023-07-15 RX ADMIN — POTASSIUM CHLORIDE 40 MEQ: 1500 TABLET, EXTENDED RELEASE ORAL at 08:49

## 2023-07-15 RX ADMIN — AMPICILLIN SODIUM AND SULBACTAM SODIUM 3 G: 2; 1 INJECTION, POWDER, FOR SOLUTION INTRAMUSCULAR; INTRAVENOUS at 11:30

## 2023-07-15 RX ADMIN — AMPICILLIN SODIUM AND SULBACTAM SODIUM 3 G: 2; 1 INJECTION, POWDER, FOR SOLUTION INTRAMUSCULAR; INTRAVENOUS at 05:12

## 2023-07-15 RX ADMIN — DIBASIC SODIUM PHOSPHATE, MONOBASIC POTASSIUM PHOSPHATE AND MONOBASIC SODIUM PHOSPHATE 500 MG: 852; 155; 130 TABLET ORAL at 05:14

## 2023-07-15 RX ADMIN — MAGNESIUM SULFATE HEPTAHYDRATE 2 G: 2 INJECTION, SOLUTION INTRAVENOUS at 08:53

## 2023-07-15 ASSESSMENT — ENCOUNTER SYMPTOMS
CHILLS: 0
VOMITING: 0
COUGH: 0
HEADACHES: 0
FEVER: 0
BLOOD IN STOOL: 0
SHORTNESS OF BREATH: 0
MYALGIAS: 0
NERVOUS/ANXIOUS: 1
NAUSEA: 0
ABDOMINAL PAIN: 0
HALLUCINATIONS: 0
BACK PAIN: 0
DIZZINESS: 0
PALPITATIONS: 0
SORE THROAT: 0
DEPRESSION: 0
HEARTBURN: 0
FOCAL WEAKNESS: 0
ROS SKIN COMMENTS: WOUND
WEAKNESS: 0
DIARRHEA: 0

## 2023-07-15 ASSESSMENT — PAIN DESCRIPTION - PAIN TYPE
TYPE: SURGICAL PAIN
TYPE: SURGICAL PAIN

## 2023-07-15 NOTE — PROGRESS NOTES
[facility] Medicine Daily Progress Note    Date of Service  7/15/2023    Chief Complaint  Esequiel Molina is a 33 y.o. male admitted 7/12/2023 with foot wound with drainage and foul odor    Hospital Course  History of alcoholic cirrhosis, sober for 2 years, type 2 diabetes admitted for left foot pain redness, wound and drainage.  He was found to have an abscess on imaging and likely surgery was consulted.  They performed an I&D on 7/12 with cultures were taken.  He started empiric IV antibiotics syncope.  Visual 7/13:    Interval Problem Update  7/13: MRI performed and confirms osteomyelitis and cellulitis.  He will likely need at least a partial amputation and will need ID consultation for IV antibiotics.  Pending final Ortho recommendations.  Continue IV Vanco and Unasyn.  Sugars are not at goal, Lantus added    7/14: Patient declining surgery, ID consulted for antibiotic treatment.  Cultures with MSSA.  Positive blood culture from 7/12 (strep), repeat drawn.  Sugars improved with Lantus.  Phos low, replaced    7/15: AM sugar still elevated Lantus increased.  Operative cultures are polymicrobial, daptomycin added.  End date 8/25    I have discussed this patient's plan of care and discharge plan at IDT rounds today with Case Management, Nursing, Nursing leadership, and other members of the IDT team.    Consultants/Specialty  orthopedics    Code Status  Full Code    Disposition  The patient is not medically cleared for discharge to home or a post-acute facility.  Anticipate discharge to: home with close outpatient follow-up    I have placed the appropriate orders for post-discharge needs.    Review of Systems  Review of Systems   Constitutional:  Negative for chills, fever and malaise/fatigue.   HENT:  Negative for sore throat.    Respiratory:  Negative for cough and shortness of breath.    Cardiovascular:  Negative for chest pain and palpitations.   Gastrointestinal:  Negative for abdominal pain, blood in stool,  diarrhea, heartburn, nausea and vomiting.   Genitourinary:  Negative for dysuria and frequency.   Musculoskeletal:  Negative for back pain and myalgias.   Skin:         Wound   Neurological:  Negative for dizziness, focal weakness, weakness and headaches.   Psychiatric/Behavioral:  Negative for depression and hallucinations. The patient is nervous/anxious.    All other systems reviewed and are negative.       Physical Exam  Temp:  [36.8 °C (98.2 °F)-37.3 °C (99.1 °F)] 36.9 °C (98.5 °F)  Pulse:  [56-74] 73  Resp:  [17-18] 18  BP: (112-120)/(62-75) 120/75  SpO2:  [96 %-98 %] 98 %    Physical Exam  Constitutional:       Appearance: Normal appearance.   HENT:      Head: Normocephalic and atraumatic.      Nose: Nose normal.      Mouth/Throat:      Mouth: Mucous membranes are moist.   Eyes:      Extraocular Movements: Extraocular movements intact.      Pupils: Pupils are equal, round, and reactive to light.   Cardiovascular:      Rate and Rhythm: Normal rate and regular rhythm.      Heart sounds: No murmur heard.  Pulmonary:      Effort: Pulmonary effort is normal. No respiratory distress.      Breath sounds: Normal breath sounds. No stridor.   Abdominal:      General: Abdomen is flat. Bowel sounds are normal. There is no distension.      Palpations: Abdomen is soft.      Tenderness: There is no abdominal tenderness.   Musculoskeletal:         General: No swelling, tenderness or deformity.      Cervical back: Normal range of motion and neck supple.      Comments: Dressing c/d/I   Skin:     General: Skin is warm and dry.      Coloration: Skin is not pale.   Neurological:      General: No focal deficit present.      Mental Status: He is alert and oriented to person, place, and time. Mental status is at baseline.   Psychiatric:         Speech: Speech is rapid and pressured.         Behavior: Behavior normal.         Fluids    Intake/Output Summary (Last 24 hours) at 7/15/2023 1247  Last data filed at 7/15/2023 1000  Gross  per 24 hour   Intake 2000.19 ml   Output --   Net 2000.19 ml         Laboratory  Recent Labs     07/12/23  1557 07/13/23  0009 07/14/23  0346   WBC 10.8 9.3 7.0   RBC 4.68* 3.76* 3.66*   HEMOGLOBIN 13.4* 10.9* 10.4*   HEMATOCRIT 39.0* 31.9* 30.7*   MCV 83.3 84.8 83.9   MCH 28.6 29.0 28.4   MCHC 34.4 34.2 33.9   RDW 38.4 39.1 40.2   PLATELETCT 94* 79* 85*   MPV 11.9 12.6 12.6       Recent Labs     07/13/23  0009 07/14/23  0346 07/15/23  0144   SODIUM 123* 136 136   POTASSIUM 3.8 3.4* 3.4*   CHLORIDE 88* 102 101   CO2 20 22 22   GLUCOSE 363* 151* 162*   BUN 13 13 10   CREATININE 0.93 0.64 0.62   CALCIUM 7.9* 8.1* 7.8*       Recent Labs     07/12/23  1557   INR 1.48*                 Imaging  MR-FOOT-WITH & W/O LEFT   Final Result      1.  Limited study due to extensive patient motion artifact and failed fat saturation.      2.  Some marrow edema and enhancement of the first metatarsal and the first proximal phalanx likely representing osteomyelitis.      3.  Extensive cellulitis involving the great toe with extension into the mid and forefoot most prominently medially. There are areas of soft tissue ulceration seen as well.      DX-ANKLE 3+ VIEWS LEFT   Final Result      1.  There is diffuse swelling of the left ankle and hindfoot with no underlying osteomyelitis.      DX-FOOT-COMPLETE 3+ LEFT   Final Result      1.  There is diffuse swelling of the left foot predominantly adjacent to the 1st metatarsal with soft tissue air lucencies suggestive of cellulitis or potentially abscess.   2.  No radiographic evidence of osteomyelitis.      DX-CHEST-PORTABLE (1 VIEW)   Final Result      1.  There is no acute cardiopulmonary process.             Assessment/Plan  * Abscess of left foot- (present on admission)  Assessment & Plan  Chronic DM ulcer which was healing with wound care, then he developed pain, swelling, fever  Imaging showed abscess formation with gas suspicious for necrotizing fasciitis s/p I&D by Dr. Ponce on  7/12  Operative cultures are polymicrobial  MRI shows osteomyelitis-patient declined any further orthopedic surgery  Discussed with ID  He will need 6 wks IV abx  Continue Unasyn, add Dapto (to cover Corynebacterium) through 8/25  Continue as needed Tylenol  Strict diabetic control  Place PICC in AM if blood cx remain negative    Bacteremia  Assessment & Plan  Real versus contaminant  Strep sp + in both cultures from 7/12  Repeat blood cultures drawn on 7/14  Will need negative cultures for 48 hours prior to placement of PICC    Hypophosphatemia  Assessment & Plan  Replace p.o.  Repeat in the morning    Acute osteomyelitis of left foot (HCC)  Assessment & Plan  Due to infected diabetic ulcer/abscess status post washout on 7/12  Cultures are growing MSSA/polymicrobial  Patient declining orthopedic intervention at this time and would like to opt for nonoperative treatment despite the increased risk of recurrent infection  I discussed with ID -anticipate 6 weeks of IV antibiotic therapy throug 8/25  Continue Unasyn + Dapto  Place PICC in a.m. if repeat blood cultures remain negative  Heel weightbearing    Normochromic normocytic anemia  Assessment & Plan  Monitor H&H if drops below 7 or 21 transfuse  Chronic disease and pancytopenia from cirrhosis contributing  No e/o bleeding  Monitor    Metabolic acidosis  Assessment & Plan  Metabolic acidosis most likely secondary to early sepsis and lactic acidosis  Improved with fluid bolus    Diabetes mellitus type II, controlled (HCC)  Assessment & Plan  Sugars are not at goal but improving  A1C of 5.8, neuropathy from DM contributing   Increased lantus to 7U   continue sliding scale   -diabetic diet    Hyponatremia- (present on admission)  Assessment & Plan  Most likely secondary to the infection and hyperglycemia  Improved with fluids  A1c has increased slightly but is still in the nondiabetic range at 5.8  DC NS    Portal venous hypertension (HCC)- (present on  admission)  Assessment & Plan  History of portal hypertension  Continue blood pressure management using beta-blockers prn    Alcoholic hepatitis with ascites- (present on admission)  Assessment & Plan  History of alcoholic hepatitis and he has been in remission for 2-1/2 years  He demonstrates pancytopenia, chronic and stable  Continue to monitor         VTE prophylaxis: SCDs/TEDs, no pharm ppx until surgical plan is finalized    I have performed a physical exam and reviewed and updated ROS and Plan today (7/15/2023). In review of yesterday's note (7/14/2023), there are no changes except as documented above.    Total time:  52 minutes.  I spent greater than 50% of the time for patient care, counseling, and coordination on this date, including unit/floor time, and face-to-face time with the patient as per interval events and assessment and plan above

## 2023-07-15 NOTE — CARE PLAN
The patient is Stable - Low risk of patient condition declining or worsening    Shift Goals  Clinical Goals: BG will be within parameters this shift, zero falls  Patient Goals: Shower  Family Goals: n/a    Progress made toward(s) clinical / shift goals:  Accuchecks done, BG remained within parameters this shift. Safety precautons kept in place. Pt had zero falls overnight. Pt was able to shower.    Patient is not progressing towards the following goals:N/a

## 2023-07-15 NOTE — WOUND TEAM
Patient will need outpatient wound care appointment for discharge for continued care to left diabetic foot wound.

## 2023-07-15 NOTE — WOUND TEAM
[facility]  Inpatient Services  Initial Wound and Skin Care Evaluation    Admission Date: 7/12/2023     Last order of IP CONSULT TO WOUND CARE was found on 7/14/2023 from Hospital Encounter on 7/12/2023     HPI, PMH, SH: Reviewed    Past Surgical History:   Procedure Laterality Date    INCISION AND DRAINAGE ORTHOPEDIC Left 7/12/2023    Procedure: INCISION AND DRAINAGE foot;  Surgeon: Michael Ponce M.D.;  Location: SURGERY [facility];  Service: Orthopedics     Social History     Tobacco Use    Smoking status: Former     Types: Cigarettes    Smokeless tobacco: Current     Types: Chew   Substance Use Topics    Alcohol use: Not Currently     Chief Complaint   Patient presents with    Wound Infection     Ulcer on the Left ball of foot for many months. Became worse 2 days ago.    Abdominal Pain     He feels the abd pain stems from the inability to eat.     Diagnosis: Abscess of left foot [L02.612]    Unit where seen by Wound Team: 1101/01     WOUND CONSULT RELATED TO:  Left foot post I&D day 2    WOUND HISTORY:  MD Zabrina Nina, [facility] Medicine:  7/13: MRI performed and confirms osteomyelitis and cellulitis.  He will likely need at least a partial amputation and will need ID consultation for IV antibiotics.  Pending final Ortho recommendations.  Continue IV Vanco and Unasyn.  Sugars are not at goal, Lantus added     7/14: Patient declining surgery, ID consulted for antibiotic treatment.  Cultures with MSSA.  Positive blood culture from 7/12 (strep), repeat drawn.  Sugars improved with Lantus.  Phos low, replaced    7/14/23:   MD Zabrina Cisse, Infectious Disease: Esequiel Molina is a 33 y.o.  admitted 7/12/2023. Pt has a past medical history of DMT2, alcoholic cirrhosis and sober x2 years per notes, admitted with left foot pain, erythema and drainage.  Febrile to 103.4 on arrival.  MRI left foot with limited study due to motion artifact, some marrow enhancement the first metatarsal and first  proximal likely representing osteomyelitis, extensive cellulitis of the great toe with extension into the midfoot.  Orthopedic surgery were consulted and the patient went to the OR.  MRSA nares negative.     7/14/23:   MD Michael Ponce, Surgery Orthopedic:   Assessment:  Left forefoot infection with associated osteomyelitis with first ray     Plan:    Mixed he is declining surgical options at this time.  Discussed the pros and cons and risks and benefits of nonoperative treatment.  We will turn over his care to wound care at this point.    WOUND ASSESSMENT/LDA  Wound 07/12/23 Incision Foot Left Penrose,  (Active)   Date First Assessed/Time First Assessed: 07/12/23 1949   Primary Wound Type: Incision  Location: Foot  Laterality: Left  Wound Description (Comments): Penrose,       Assessments 7/14/2023  4:00 PM   Wound Image      Site Assessment Pink;Red;Yellow;White;Drainage;Edema;Fragile;Slough;Tunneling   Periwound Assessment Pink;Red;White;Maceration;Denuded;Warm;Blistered;Edema;Blanchable erythema;Fragile   Margins Defined edges   Closure Secondary intention;Sutures   Drainage Amount Moderate   Drainage Description Serosanguineous   Treatments Cleansed;Site care;Offloading;Nonselective debridement   Offloading/DME Offloading Shoe   Wound Cleansing Normal Saline Irrigation   Periwound Protectant Mepitel   Dressing Status Removed   Dressing Changed Changed   Dressing Options Hydrofiber Silver;Absorbent Abdominal Pad;Dry Roll Gauze   Dressing Change/Treatment Frequency Daily, and As Needed   NEXT Dressing Change/Treatment Date 07/15/23   NEXT Weekly Photo (Inpatient Only) 07/18/23   Wound Team Following Bi-Weekly   Non-staged Wound Description Full thickness   Wound Length (cm) 6.5 cm   Wound Width (cm) 1 cm   Wound Depth (cm) 6 cm   Wound Surface Area (cm^2) 6.5 cm^2   Wound Volume (cm^3) 39 cm^3   Wound Bed Granulation (%) 20 %   Wound Bed Slough (%) 80 %   Tunneling (cm) 6 cm   Shape linear x1; round x1   Wound Odor  Mild   Pulses Brachial;Popliteal   Exposed Structures Adipose;Bone;Connective tissue   WOUND NURSE ONLY - Time Spent with Patient (mins) 60        Vascular:  FINDINGS:  The deep venous system of the left lower extremity was examined from the common femoral vein through the veins of the calf and demonstrates normal compressibility and hemodynamics without evidence for DVT.     IMPRESSION:     No evidence for DVT left lower extremity.     Good Cooper MD  2/15/2023 2:32 PM        HISTORY/REASON FOR EXAM: Ulcer on Upper/Lower Limb; Pain in Limb with Pressure           TECHNIQUE/EXAM DESCRIPTION: LEFT lower extremity doppler venous ultrasound was performed, 2/15/2023 1:39 PM     FINDINGS:  The deep venous system of the lower extremity was examined from the common femoral vein through the veins of the calf and demonstrates normal compressibility and hemodynamics without evidence for DVT.        No evidence for DVT lower extremity.           Exam Ended: 02/15/23 14:22 Last Resulted: 02/15/23 14:32           INDERJIT:   FINDINGS:  Sonographic imaging of the left leg arterial system with waveform analysis and Doppler interrogation were identified.     All visualized vessels are patent.     Peak systolic velocities are within normal range     Normal waveforms.     IMPRESSION:     No sonographic evidence of hemodynamically significant stenosis within the left leg arterial system           Exam Ended: 02/15/23 14:21 Last Resulted: 02/15/23 14:34             Lab Values:    Lab Results   Component Value Date/Time    WBC 7.0 07/14/2023 03:46 AM    RBC 3.66 (L) 07/14/2023 03:46 AM    HEMOGLOBIN 10.4 (L) 07/14/2023 03:46 AM    HEMATOCRIT 30.7 (L) 07/14/2023 03:46 AM    SEDRATEWES 30 (H) 07/12/2023 03:57 PM    HBA1C 5.8 (H) 07/13/2023 12:09 AM         Culture Results show:  Recent Results (from the past 720 hour(s))   Culture Wound W/ Gram Stain    Collection Time: 07/12/23  6:41 PM    Specimen: Left Foot; Wound   Result Value Ref Range     Significant Indicator POS (POS)     Source WND     Site LEFT FOOT     Culture Result Heavy growth usual skin ramonita. (A)     Gram Stain Result       Many WBCs.  Many mixed bacteria, no predominant organism seen.      Culture Result (A)      Staphylococcus aureus  Moderate growth  Methicillin sensitive by screening method         Susceptibility    Staphylococcus aureus - CONCETTA     Azithromycin <=2 Sensitive mcg/mL     Clindamycin 0.5 Sensitive mcg/mL     Cefazolin <=8 Sensitive mcg/mL     Cefepime <=4 Sensitive mcg/mL     Ceftaroline <=0.5 Sensitive mcg/mL     Daptomycin 1 Sensitive mcg/mL     Erythromycin <=0.25 Sensitive mcg/mL     Ampicillin/sulbactam <=8/4 Sensitive mcg/mL     Vancomycin 1 Sensitive mcg/mL     Oxacillin <=0.25 Sensitive mcg/mL     Pip/Tazobactam <=8 Sensitive mcg/mL     Trimeth/Sulfa <=0.5/9.5 Sensitive mcg/mL     Tetracycline <=4 Sensitive mcg/mL       Pain Level/Medicated:  None, Tolerated without pain medication       INTERVENTIONS BY WOUND TEAM:  Chart and images reviewed. Discussed with bedside RN. All areas of concern (based on picture review, LDA review and discussion with bedside RN) have been thoroughly assessed. Documentation of areas based on significant findings. This RN in to assess patient. Performed standard wound care which includes appropriate positioning, dressing removal and non-selective debridement. Pictures and measurements obtained weekly if/when required.    Wound:  LEFT MEDIAL AND PLANTAR FOOT   Preparation for Dressing removal: Dressing soaked with saline  Cleansed/Non-selectively Debrided with:  No rinse foam soap and Moist warm washcloth  Nuvia wound: Cleansed with No rinse foam soap and Moist warm washcloth, Prepped with No Sting and Mepitel One  Primary Dressing:  AQUACEL AG+  Secondary (Outer) Dressing: ABD pad and rolled gauze    Advanced Wound Care Discharge Planning  Number of Clinicians necessary to complete wound care: 1  Is patient requiring IV pain medications  for dressing changes:  No   Length of time for dressing change 60 min. (This does not include chart review, pre-medication time, set up, clean up or time spent charting.)    Interdisciplinary consultation: Patient, Bedside RN (Cliff), MD Cisse at bedside     EVALUATION / RATIONALE FOR TREATMENT:     Date:  07/14/23  Wound Status:  Initial evaluation    7/14/23: Patient with chronic infected diabetic foot wound that was surgically debrided two days ago by MD Ponce.     -Two open areas: left medial 1st metatarsal that tunnels 6cm to open medial plantar foot wound. There are intact sutures above and below wound opening to left 1st metatarsal, white penrose drain in place. The danni-wound is macerated and edematous with peeling white nonviable tissue that easily peels away with cleansing and gauze.   -The plantar aspect of the wound is majority yellow slough and white nonviable tissue with a small amount of red granular tissue present, danni-wound is callused. Bone is visible and palpable in wound bed.   -  Mepitel placed over sutures to protect. Aquacel Ag Hydrofiber applied and tucked in to wound bed to manage bioburden, absorb exudate, and maintain a moist wound environment without laterally wicking exudate therefore reducing danni-wound maceration, covered with ABD pad and rolled gauze.            Goals: Steady decrease in wound area and depth weekly.    WOUND TEAM PLAN OF CARE - Frequency of Follow-up:   Nursing to follow dressing orders written for wound care. Contact wound team if area fails to progress, deteriorates or with any questions/concerns if something comes up before next scheduled follow up (See below as to whether wound is following and frequency of wound follow up)   Bi-weekly - 7/18/23    NURSING PLAN OF CARE ORDERS:  Dressing changes: See Dressing Care orders    NUTRITION RECOMMENDATIONS   Wound Team Recommendations:  Diabetes Education consult  High protein diet    DIET ORDERS (From admission to  next 24h)       Start     Ordered    07/14/23 0747  Diet Order Diet: Consistent CHO (Diabetic)  ALL MEALS        Question:  Diet:  Answer:  Consistent CHO (Diabetic)    07/14/23 0746                    PREVENTATIVE INTERVENTIONS:    Q shift Richard - performed per nursing policy  Q shift pressure point assessments - performed per nursing policy    Offloading/Redistribution  Offloading shoe and FWW - Currently in Place    Anticipated discharge plans:  Outpatient Wound Center        Vac Discharge Needs:  Vac Discharge plan is purely a recommendation from wound team and not a requirement for discharge unless otherwise stated by physician.  Not Applicable Pt not on a wound vac

## 2023-07-15 NOTE — PROGRESS NOTES
Infectious Disease Progress Note    Author: Nell Steven M.D. Date & Time of service: 7/15/2023  8:44 AM    Chief Complaint:  Follow-up for streptococcal bacteremia, osteomyelitis of the left foot    Interval History:  7/15 patient afebrile, creatinine 0.62.  Repeat blood cultures are negative to date.  Operative cultures are polymicrobial.  Patient is tolerating antibiotics without any issues.      Labs Reviewed, Medications Reviewed, and Wound Reviewed.    Review of Systems:  Review of Systems   Constitutional:  Negative for chills and fever.   Respiratory:  Negative for cough and shortness of breath.    Gastrointestinal:  Negative for abdominal pain, diarrhea, nausea and vomiting.   Neurological:  Negative for dizziness and headaches.       Hemodynamics:  Temp (24hrs), Av.9 °C (98.4 °F), Min:36.6 °C (97.9 °F), Max:37.3 °C (99.1 °F)  Temperature: 36.9 °C (98.4 °F)  Pulse  Av.3  Min: 56  Max: 104   Blood Pressure: 117/62       Physical Exam:  Physical Exam  Vitals and nursing note reviewed.   Eyes:      Pupils: Pupils are equal, round, and reactive to light.   Cardiovascular:      Rate and Rhythm: Normal rate.   Pulmonary:      Effort: Pulmonary effort is normal. No respiratory distress.      Breath sounds: No wheezing.   Abdominal:      Palpations: Abdomen is soft.   Musculoskeletal:      Comments: Left foot in dressing   Neurological:      General: No focal deficit present.      Mental Status: He is alert and oriented to person, place, and time.   Psychiatric:         Mood and Affect: Mood normal.         Behavior: Behavior normal.         Meds:    Current Facility-Administered Medications:     magnesium sulfate    potassium chloride SA    [START ON 2023] insulin GLARGINE    phosphorus    NS    senna-docusate **AND** polyethylene glycol/lytes **AND** magnesium hydroxide **AND** bisacodyl    LR    acetaminophen    Notify provider if pain remains uncontrolled **AND** Use the Numeric Rating Scale  (NRS), Zapata-Hanna Faces (WBF), or FLACC on regular floors and Critical-Care Pain Observation Tool (CPOT) on ICUs/Trauma to assess pain **AND** Pulse Ox **AND** Pharmacy Consult Request **AND** If patient difficult to arouse and/or has respiratory depression (respiratory rate of 10 or less), stop any opiates that are currently infusing and call a Rapid Response.    oxyCODONE immediate-release **OR** oxyCODONE immediate-release **OR** morphine injection    ampicillin-sulbactam (UNASYN) IV    labetalol    ondansetron    ondansetron    promethazine    promethazine    prochlorperazine    insulin regular **AND** POC blood glucose manual result **AND** NOTIFY MD and PharmD **AND** Administer 20 grams of glucose (approximately 8 ounces of fruit juice) every 15 minutes PRN FSBG less than 70 mg/dL **AND** dextrose bolus    Labs:  Recent Labs     07/12/23  1557 07/13/23 0009 07/14/23 0346   WBC 10.8 9.3 7.0   RBC 4.68* 3.76* 3.66*   HEMOGLOBIN 13.4* 10.9* 10.4*   HEMATOCRIT 39.0* 31.9* 30.7*   MCV 83.3 84.8 83.9   MCH 28.6 29.0 28.4   RDW 38.4 39.1 40.2   PLATELETCT 94* 79* 85*   MPV 11.9 12.6 12.6   NEUTSPOLYS 90.00*  --  81.00*   LYMPHOCYTES 3.10*  --  10.30*   MONOCYTES 6.00  --  7.70   EOSINOPHILS 0.00  --  0.30   BASOPHILS 0.30  --  0.10   RBCMORPHOLO Normal  --   --      Recent Labs     07/13/23  0009 07/14/23  0346 07/15/23  0144   SODIUM 123* 136 136   POTASSIUM 3.8 3.4* 3.4*   CHLORIDE 88* 102 101   CO2 20 22 22   GLUCOSE 363* 151* 162*   BUN 13 13 10     Recent Labs     07/12/23  1557 07/13/23  0009 07/14/23  0346 07/15/23  0144   ALBUMIN 4.0  --  3.0* 2.8*   TBILIRUBIN 2.0*  --   --   --    ALKPHOSPHAT 197*  --   --   --    TOTPROTEIN 7.0  --   --   --    ALTSGPT 14  --   --   --    ASTSGOT 18  --   --   --    CREATININE 0.94 0.93 0.64 0.62       Imaging:  MR-FOOT-WITH & W/O LEFT    Result Date: 7/13/2023 7/13/2023 12:56 PM HISTORY/REASON FOR EXAM: Open wound left foot with foot infection. TECHNIQUE/EXAM  DESCRIPTION: MRI of the LEFT foot with contrast. Using a bigclix.coma 1.5 Gabriella MRI scanner, T1 axial, sagittal, and coronal, fast spin-echo T2 fat-suppressed axial and coronal, fast inversion recovery sagittal, and T1 fat suppressed axial and sagittal post intravenous contrast images were obtained. A total of mL ProHance given. COMPARISON: None. FINDINGS: Examination is limited due to patient motion artifact and extensive failed fat saturation. Osseous structures/Cartilaginous surfaces: There is limited evaluation of the osseous structures due to motion artifact and failed fat saturation. There is some faint marrow edema and enhancement of the first metatarsal as well as the proximal phalanx. No evidence of bony destructive change. Miscellaneous: There is extensive soft tissue edema and enhancement of the mid and forefoot most prominently surrounding the great toe and involving the medial forefoot. There is also soft tissue ulceration involving the great toe in underlying the first  MTP joint. No focal abscess.     1.  Limited study due to extensive patient motion artifact and failed fat saturation. 2.  Some marrow edema and enhancement of the first metatarsal and the first proximal phalanx likely representing osteomyelitis. 3.  Extensive cellulitis involving the great toe with extension into the mid and forefoot most prominently medially. There are areas of soft tissue ulceration seen as well.    DX-FOOT-COMPLETE 3+ LEFT    Result Date: 7/12/2023 7/12/2023 3:58 PM HISTORY/REASON FOR EXAM:  Ulcer on the ball of the foot for several months recently progressing. TECHNIQUE/EXAM DESCRIPTION AND NUMBER OF VIEWS: 3 views of the LEFT foot. COMPARISON:  None FINDINGS: There is diffuse soft tissue swelling of the left ankle and foot. There are multiple small air lucencies in the soft tissues over the dorsum and medial aspect of the left foot adjacent to the 1st digit. Possible one small air lucency anterior to the ankle. No  radiopaque foreign body. No periostitis to suggest osteomyelitis. There is no evidence of displaced fracture or dislocation. The alignment is maintained. Minimal degenerative change in the tarsometatarsal junctions and 1st MTP joint.     1.  There is diffuse swelling of the left foot predominantly adjacent to the 1st metatarsal with soft tissue air lucencies suggestive of cellulitis or potentially abscess. 2.  No radiographic evidence of osteomyelitis.    DX-ANKLE 3+ VIEWS LEFT    Result Date: 7/12/2023 7/12/2023 3:58 PM HISTORY/REASON FOR EXAM:  Ulcer on the ball of the left foot for several months progressing over the past 2 days. TECHNIQUE/EXAM DESCRIPTION AND NUMBER OF VIEWS:  3 views of the LEFT ankle. COMPARISON: None FINDINGS: There is diffuse swelling of the left ankle and hindfoot. There is no soft tissue gas evident radiographically. No foreign body. There is no displaced fracture or dislocation. The alignment of the ankle is within normal limits. There is degenerative change in the dorsal tarsometatarsal junctions.     1.  There is diffuse swelling of the left ankle and hindfoot with no underlying osteomyelitis.    DX-CHEST-PORTABLE (1 VIEW)    Result Date: 7/12/2023 7/12/2023 3:54 PM HISTORY/REASON FOR EXAM:  Ulcer of the left foot. Possible sepsis. TECHNIQUE/EXAM DESCRIPTION AND NUMBER OF VIEWS: Single portable view of the chest. COMPARISON: 10/31/2014 FINDINGS: The mediastinal and cardiac silhouette is unremarkable. The pulmonary vascularity is within normal limits. The lung parenchyma is clear. There is no significant pleural effusion. There is no visible pneumothorax. There are no acute bony abnormalities.     1.  There is no acute cardiopulmonary process.      Micro:  Results       Procedure Component Value Units Date/Time    BLOOD CULTURE [[id number]] Collected: 07/14/23 0801    Order Status: Completed Specimen: Blood from Peripheral Updated: 07/15/23 0822     Significant Indicator NEG     Source  "BLD     Site PERIPHERAL     Culture Result No Growth  Note: Blood cultures are incubated for 5 days and  are monitored continuously.Positive blood cultures  are called to the RN and reported as soon as  they are identified.  Blood culture testing and Gram stain, if indicated, are  performed at [facility], [address] [place] [address].  Positive blood cultures are  sent to [facility], [address] [place] [address], for organism identification and  susceptibility testing.      Narrative:      Per Hospital Policy: Only change Specimen Src: to \"Line\" if  specified by physician order.  Right Hand    BLOOD CULTURE [[id number]] Collected: 07/14/23 0801    Order Status: Completed Specimen: Blood from Peripheral Updated: 07/15/23 0822     Significant Indicator NEG     Source BLD     Site PERIPHERAL     Culture Result No Growth  Note: Blood cultures are incubated for 5 days and  are monitored continuously.Positive blood cultures  are called to the RN and reported as soon as  they are identified.  Blood culture testing and Gram stain, if indicated, are  performed at [facility], [address] [place] [address].  Positive blood cultures are  sent to [facility], [address] [place] [address], for organism identification and  susceptibility testing.      Narrative:      Per Hospital Policy: Only change Specimen Src: to \"Line\" if  specified by physician order.  Right AC    Urine Culture (New) [[id number]] Collected: 07/12/23 1550    Order Status: Completed Specimen: Urine Updated: 07/15/23 0702     Significant Indicator NEG     Source UR     Site -     Culture Result Usual skin ramonita <10,000 cfu/mL    Narrative:      Indication for culture:->Evaluation for sepsis without a  clear source of infection  Indication for culture:->Evaluation for sepsis without a    Fungal Culture [[id number]] Collected: 07/12/23 1938    Order Status: " Completed Specimen: Wound Updated: 07/14/23 1451     Significant Indicator NEG     Source WND     Site left foot #2     Culture Result Culture in progress.     Fungal Smear Results No fungal elements seen.    Narrative:      2 left foot swab #2  Surgery - swabs received    Anaerobic Culture [[id number]] Collected: 07/12/23 1938    Order Status: Completed Specimen: Wound Updated: 07/14/23 1451     Significant Indicator NEG     Source WND     Site left foot #2     Culture Result Culture in progress.    Narrative:      2 left foot swab #2  Surgery - swabs received    CULTURE WOUND W/ GRAM STAIN [[id number]] Collected: 07/12/23 1938    Order Status: Completed Specimen: Wound Updated: 07/14/23 1451     Significant Indicator NEG     Source WND     Site left foot #2     Culture Result Culture in progress.     Gram Stain Result Many WBCs.  Moderate Gram positive cocci.      Narrative:      2 left foot swab #2  Surgery - swabs received    Anaerobic Culture [[id number]] Collected: 07/12/23 1845    Order Status: Completed Specimen: Wound Updated: 07/14/23 1444     Significant Indicator NEG     Source WND     Site left foot     Culture Result Culture in progress.    Narrative:      1 left foot swab #1  Surgery - swabs received    CULTURE WOUND W/ GRAM STAIN [[id number]] Collected: 07/12/23 1845    Order Status: Completed Specimen: Wound Updated: 07/14/23 1444     Significant Indicator NEG     Source WND     Site left foot     Culture Result Culture in progress.     Gram Stain Result Many WBCs.  Many Gram positive cocci.      Narrative:      1 left foot swab #1  Surgery - swabs received    Fungal Culture [[id number]] Collected: 07/12/23 1845    Order Status: Completed Specimen: Wound Updated: 07/14/23 1444     Significant Indicator NEG     Source WND     Site left foot     Culture Result Culture in progress.     Fungal Smear Results No fungal elements seen.    Narrative:      1 left foot swab #1  Surgery - swabs received    Culture  "Wound W/ Gram Stain [[id number]]  (Abnormal)  (Susceptibility) Collected: 07/12/23 1841    Order Status: Completed Specimen: Wound from Left Foot Updated: 07/14/23 1210     Significant Indicator POS     Source WND     Site LEFT FOOT     Culture Result Heavy growth usual skin ramonita.     Gram Stain Result Many WBCs.  Many mixed bacteria, no predominant organism seen.       Culture Result Staphylococcus aureus  Moderate growth  Methicillin sensitive by screening method      Susceptibility       Staphylococcus aureus (1)       Antibiotic Interpretation Microscan   Method Status    Azithromycin Sensitive <=2 mcg/mL CONCETTA Final    Clindamycin Sensitive 0.5 mcg/mL CONCETTA Final    Cefazolin Sensitive <=8 mcg/mL CONCTETA Final    Cefepime Sensitive <=4 mcg/mL CONCETTA Final    Ceftaroline Sensitive <=0.5 mcg/mL CONCETTA Final    Daptomycin Sensitive 1 mcg/mL CONCETTA Final    Erythromycin Sensitive <=0.25 mcg/mL CONCETTA Final    Ampicillin/sulbactam Sensitive <=8/4 mcg/mL CONCETTA Final    Vancomycin Sensitive 1 mcg/mL CONCETTA Final    Oxacillin Sensitive <=0.25 mcg/mL CONCETTA Final    Pip/Tazobactam Sensitive <=8 mcg/mL CONCETTA Final    Trimeth/Sulfa Sensitive <=0.5/9.5 mcg/mL CONCETTA Final    Tetracycline Sensitive <=4 mcg/mL CONCETTA Final                       Blood Culture [[id number]]  (Abnormal) Collected: 07/12/23 1557    Order Status: Completed Specimen: Blood from Peripheral Updated: 07/14/23 0049     Significant Indicator POS     Source BLD     Site PERIPHERAL     Culture Result Growth detected by Bactec instrument. 07/14/2023  00:46  Gram Stain: Gram positive cocci: Possible Streptococcus sp.      Narrative:      Per Hospital Policy: Only change Specimen Src: to \"Line\" if  specified by physician order.  Left AC    GRAM STAIN [[id number]]  (Abnormal) Collected: 07/12/23 1938    Order Status: Completed Specimen: Wound Updated: 07/13/23 2037     Significant Indicator .     Source WND     Site left foot #2     Gram Stain Result Many WBCs.  Moderate Gram positive cocci.      " "Narrative:      2 left foot swab #2  Surgery - swabs received    Fungal Smear [[id number]] Collected: 07/12/23 1938    Order Status: Completed Specimen: Wound Updated: 07/13/23 2037     Significant Indicator NEG     Source WND     Site left foot #2     Fungal Smear Results No fungal elements seen.    Narrative:      2 left foot swab #2  Surgery - swabs received    GRAM STAIN [[id number]] Collected: 07/12/23 1845    Order Status: Completed Specimen: Wound Updated: 07/13/23 2036     Significant Indicator .     Source WND     Site left foot     Gram Stain Result Many WBCs.  Many Gram positive cocci.      Narrative:      1 left foot swab #1  Surgery - swabs received    Fungal Smear [[id number]] Collected: 07/12/23 1845    Order Status: Completed Specimen: Wound Updated: 07/13/23 2036     Significant Indicator NEG     Source WND     Site left foot     Fungal Smear Results No fungal elements seen.    Narrative:      1 left foot swab #1  Surgery - swabs received    Blood Culture [[id number]]  (Abnormal) Collected: 07/12/23 1614    Order Status: Completed Specimen: Blood from Peripheral Updated: 07/13/23 1826     Significant Indicator POS     Source BLD     Site PERIPHERAL     Culture Result Growth detected by Bactec instrument. 07/13/2023  18:24  Gram Stain: Gram positive cocci: Possible Streptococcus sp.      Narrative:      CALL  [facility] tel. [phone],  CALLED  [facility] tel. [phone] 07/13/2023, 18:26, RB PERF. RESULTS CALLED TO:  MALINA Coronado [id number]  Per Hospital Policy: Only change Specimen Src: to \"Line\" if  specified by physician order.  Right Forearm/Arm    MRSA By PCR (Amp) [[id number]] Collected: 07/13/23 0718    Order Status: Completed Specimen: Respirate from Nares Updated: 07/13/23 1549     MRSA by PCR Negative    Narrative:      Collected By: [id number] ROBERT BHANU WHITMAN    GRAM STAIN [[id number]] Collected: 07/12/23 1841    Order Status: Completed Specimen: Wound Updated: 07/13/23 0700     Significant Indicator .     " Source WND     Site LEFT FOOT     Gram Stain Result Many WBCs.  Many mixed bacteria, no predominant organism seen.      AFB Culture [[id number]] Collected: 07/12/23 1938    Order Status: Canceled Specimen: Other     AFB Culture [[id number]] Collected: 07/12/23 1845    Order Status: Canceled Specimen: Other     Urinalysis [[id number]]  (Abnormal) Collected: 07/12/23 1550    Order Status: Completed Specimen: Blood Updated: 07/12/23 1620     Color Yellow     Character Hazy     Specific Gravity 1.025     Ph 5.5     Glucose 100 mg/dL      Ketones Negative mg/dL      Protein 100 mg/dL      Bilirubin Moderate     Nitrite Positive     Leukocyte Esterase Negative     Occult Blood Trace     Micro Urine Req Microscopic    Narrative:      Indication for culture:->Evaluation for sepsis without a  clear source of infection            Assessment:  33 y.o. man admitted 7/12/2023. Pt has a past medical history of DMT2, alcoholic cirrhosis and sober x2 years per notes, admitted with left foot pain, erythema and drainage.  Febrile to 103.4 on arrival.  MRI left foot w/ osteomyelitis, extensive cellulitis of the great toe with extension into the midfoot.  Orthopedic surgery were consulted and the patient went to the OR.  He is status post I&D with drainage of abscess from plantar and dorsal aspects of the left foot on 7/12/2023.  Per the procedure note, there was significant purulence noted and the wound bed probed all the way down to the lateral aspect of the dorsal forefoot.    Pertinent diagnoses:  Bacteremia,  secondary to foot infection  -Blood cultures on 7/12 + possible Streptococcus species (confirmed Gram stain with microbiology department)   Cellulitis of left great toe and midfoot, abscess  Osteomyelitis per MRI of first metatarsal and first proximal phalanx  -OR on 7/12 for I&D with drainage of abscess from plantar and dorsal aspects of the left foot.  Per op note very foul odor and significant purulence, cultures were  obtained.  -Wound cultures on 7/12 +MSSA  - OR cultures from 7/12 +GPCs   Diabetes mellitus  Alcoholic cirrhosis  History of alcohol abuse  Thrombocytopenia    Plan:  -Start IV daptomycin 6 mg/kg daily to cover corynebacterium  -Monitor CPK while on IV daptomycin  -Continue IV Unasyn 3 g every 6 hours  -Follow wound cultures on 7/12+ MSSA  -Status post I&D with drainage of abscess on 7/12/2023.  Per the procedure note, there was significant purulence noted and the wound bed probed all the way down to the lateral aspect of the dorsal forefoot and tracked medially communicating with the plantar ulcer.  -Follow OR culture on 7/12+ Streptococcus intermedius, MSSA, corynebacterium stratum group, Enterococcus faecalis and Pasteurella canis oralis.  Follow Enterococcus faecalis susceptibilities susceptibilities.  Spoke with [employer]  today requesting Corynebacterium striatum group susceptibilities-this is a send out  -Blood culture on 7/12+ possible Streptococcus species.  Await speciation and susceptibilities  -Repeat blood cultures on 7/14-negative to date  -Anticipate a 6-week course of antibiotics from date of first negative blood culture on 7/14 with stop date of 8/25/2023  -Blood sugar control  -Okay with PICC line once repeat blood cultures have been negative for at least 48 hours    This infection poses a serious threat to limb function    Disposition: TBD    PICC line: Yes    Discussed with internal medicine/Dr. Nina

## 2023-07-15 NOTE — PROGRESS NOTES
0800 Sitting in bed. Eating snacks.  0900 Sitting in bed. Eating breakfast.  1000 Up in chair with family.  1100 Up in chair with family.  1200 Eating lunch.  1300 Sitting in bed. RR 18  1400 Watching TV. RR 18  1500 Watching TV. Talking on the phone.  1600 Wound care RN visit  1700 Walking around room  1800 Sitting up in bed. Eating dinner.

## 2023-07-15 NOTE — PROGRESS NOTES
Received patient from Night shift RN . Patient is awake and alert.No sign of distress. Patient denies any n/v. Dressing to left foot in place, cdi. Fall precaution in place, kept bed in lowest position and call light within reach.     0757- patient is up to bathroom, 1 time had loose BM  Patient is currently up to cardiac chair    0858- due medications given as per MAR    1053- ongoing IV meds stopped.  Needs attended. Offered ice and water    1100- BS checked, 155 mg/dl.Patient refused his Insulin regular    1130- due medications given as per MAR    1416- due antibiotics given as ordered    1530- dressing to left foot changed as ordered    1700- BS checked, no coverage    1750- due medications given as per MAR

## 2023-07-15 NOTE — PROGRESS NOTES
20:00 Shower supplies provided, left foot wound dressing covered. Pt up to bathroom to shower.  21:05 Pt resting in bed. Denies any need at this time.  23:08 pt up to bathroom to void.  00:02 RN to bedside fr medpass. IV abx Unasyn started.  00:35 IV abx stopped. Pt denies any needs at this moment.  01:40  Katia at bedside for lab draw. RN rounded. Pt drowsy, calm unlabored breathing noted.  03:45 Pt asleep, no signs f dstress noted.  0514 RN to bedside for medpass  06:25 Pt resting in bed. Denies any need at this time.

## 2023-07-15 NOTE — PROGRESS NOTES
Received report from Day shift RN Cliff. Assumed pt care. Pt is seen resting in bed. AO4, on room air. IVF NS running at 75/hr. Denies any nausea and pain at this time. POC and goals discussed, safety precautions in place.

## 2023-07-15 NOTE — PROGRESS NOTES
0700 introduced myself to patient    0900 patient awake. Watching TV    1100 patient awake. Watching TV. precautions  in placed.    1300 patient having lunch.    1500 pt watching TV    1700 pt having dinner

## 2023-07-15 NOTE — CARE PLAN
The patient is Stable - Low risk of patient condition declining or worsening    Shift Goals  Clinical Goals: Monitor patients BS throughout the shift  Patient Goals: rest comfortably  Family Goals: n/a    Progress made toward(s) clinical / shift goals:  Patient is on BS monitoring.latest this morning is 155 mg/dl    Patient is not progressing towards the following goals:    Problem: Pain - Standard  Goal: Alleviation of pain or a reduction in pain to the patient’s comfort goal  Outcome: Progressing  Flowsheets (Taken 7/15/2023 8934)  Non Verbal Scale:   Calm   Unlabored Breathing     Problem: Physical Regulation  Goal: Diagnostic test results will improve  Outcome: Progressing  Goal: Signs and symptoms of infection will decrease  Outcome: Progressing     Problem: Infection - Standard  Goal: Patient will remain free from infection  Outcome: Progressing     Problem: Wound/ / Incision Healing  Goal: Patient's wound/surgical incision will decrease in size and heals properly  Outcome: Progressing     Problem: Fall Risk  Goal: Patient will remain free from falls  Outcome: Progressing

## 2023-07-16 LAB
ALBUMIN SERPL BCP-MCNC: 3.1 G/DL (ref 3.2–4.9)
BACTERIA BLD CULT: ABNORMAL
BACTERIA SPEC ANAEROBE CULT: ABNORMAL
BACTERIA WND AEROBE CULT: ABNORMAL
BASOPHILS # BLD AUTO: 0.7 % (ref 0–1.8)
BASOPHILS # BLD: 0.04 K/UL (ref 0–0.12)
BUN SERPL-MCNC: 8 MG/DL (ref 8–22)
CALCIUM ALBUM COR SERPL-MCNC: 9.1 MG/DL (ref 8.5–10.5)
CALCIUM SERPL-MCNC: 8.4 MG/DL (ref 8.4–10.2)
CHLORIDE SERPL-SCNC: 103 MMOL/L (ref 96–112)
CK SERPL-CCNC: 41 U/L (ref 0–154)
CO2 SERPL-SCNC: 25 MMOL/L (ref 20–33)
CREAT SERPL-MCNC: 0.61 MG/DL (ref 0.5–1.4)
EOSINOPHIL # BLD AUTO: 0.12 K/UL (ref 0–0.51)
EOSINOPHIL NFR BLD: 2.2 % (ref 0–6.9)
ERYTHROCYTE [DISTWIDTH] IN BLOOD BY AUTOMATED COUNT: 40.9 FL (ref 35.9–50)
GFR SERPLBLD CREATININE-BSD FMLA CKD-EPI: 130 ML/MIN/1.73 M 2
GLUCOSE BLD STRIP.AUTO-MCNC: 123 MG/DL (ref 65–99)
GLUCOSE BLD STRIP.AUTO-MCNC: 126 MG/DL (ref 65–99)
GLUCOSE BLD STRIP.AUTO-MCNC: 139 MG/DL (ref 65–99)
GLUCOSE BLD STRIP.AUTO-MCNC: 148 MG/DL (ref 65–99)
GLUCOSE SERPL-MCNC: 139 MG/DL (ref 65–99)
GRAM STN SPEC: ABNORMAL
HCT VFR BLD AUTO: 34.2 % (ref 42–52)
HGB BLD-MCNC: 11.2 G/DL (ref 14–18)
IMM GRANULOCYTES # BLD AUTO: 0.11 K/UL (ref 0–0.11)
IMM GRANULOCYTES NFR BLD AUTO: 2 % (ref 0–0.9)
LYMPHOCYTES # BLD AUTO: 0.95 K/UL (ref 1–4.8)
LYMPHOCYTES NFR BLD: 17.1 % (ref 22–41)
MAGNESIUM SERPL-MCNC: 1.9 MG/DL (ref 1.5–2.5)
MCH RBC QN AUTO: 27.9 PG (ref 27–33)
MCHC RBC AUTO-ENTMCNC: 32.7 G/DL (ref 32.3–36.5)
MCV RBC AUTO: 85.3 FL (ref 81.4–97.8)
MONOCYTES # BLD AUTO: 0.5 K/UL (ref 0–0.85)
MONOCYTES NFR BLD AUTO: 9 % (ref 0–13.4)
NEUTROPHILS # BLD AUTO: 3.82 K/UL (ref 1.82–7.42)
NEUTROPHILS NFR BLD: 69 % (ref 44–72)
NRBC # BLD AUTO: 0 K/UL
NRBC BLD-RTO: 0 /100 WBC (ref 0–0.2)
PHOSPHATE SERPL-MCNC: 3.6 MG/DL (ref 2.5–4.5)
PLATELET # BLD AUTO: 107 K/UL (ref 164–446)
PMV BLD AUTO: 12.3 FL (ref 9–12.9)
POTASSIUM SERPL-SCNC: 3.6 MMOL/L (ref 3.6–5.5)
RBC # BLD AUTO: 4.01 M/UL (ref 4.7–6.1)
SIGNIFICANT IND 70042: ABNORMAL
SITE SITE: ABNORMAL
SODIUM SERPL-SCNC: 139 MMOL/L (ref 135–145)
SOURCE SOURCE: ABNORMAL
WBC # BLD AUTO: 5.5 K/UL (ref 4.8–10.8)

## 2023-07-16 PROCEDURE — 700111 HCHG RX REV CODE 636 W/ 250 OVERRIDE (IP): Mod: JZ | Performed by: HOSPITALIST

## 2023-07-16 PROCEDURE — 83735 ASSAY OF MAGNESIUM: CPT

## 2023-07-16 PROCEDURE — 700111 HCHG RX REV CODE 636 W/ 250 OVERRIDE (IP): Mod: JZ | Performed by: INTERNAL MEDICINE

## 2023-07-16 PROCEDURE — 94760 N-INVAS EAR/PLS OXIMETRY 1: CPT

## 2023-07-16 PROCEDURE — 85025 COMPLETE CBC W/AUTO DIFF WBC: CPT

## 2023-07-16 PROCEDURE — 36415 COLL VENOUS BLD VENIPUNCTURE: CPT

## 2023-07-16 PROCEDURE — 82550 ASSAY OF CK (CPK): CPT

## 2023-07-16 PROCEDURE — 82962 GLUCOSE BLOOD TEST: CPT | Mod: 91

## 2023-07-16 PROCEDURE — 700105 HCHG RX REV CODE 258: Performed by: HOSPITALIST

## 2023-07-16 PROCEDURE — 700105 HCHG RX REV CODE 258: Performed by: INTERNAL MEDICINE

## 2023-07-16 PROCEDURE — 770001 HCHG ROOM/CARE - MED/SURG/GYN PRIV*

## 2023-07-16 PROCEDURE — 80069 RENAL FUNCTION PANEL: CPT

## 2023-07-16 PROCEDURE — 99232 SBSQ HOSP IP/OBS MODERATE 35: CPT | Performed by: INTERNAL MEDICINE

## 2023-07-16 RX ORDER — 0.9 % SODIUM CHLORIDE 0.9 %
3 VIAL (ML) INJECTION PRN
Status: CANCELLED | OUTPATIENT
Start: 2023-07-16

## 2023-07-16 RX ORDER — ENOXAPARIN SODIUM 100 MG/ML
40 INJECTION SUBCUTANEOUS DAILY
Status: DISCONTINUED | OUTPATIENT
Start: 2023-07-16 | End: 2023-07-19 | Stop reason: HOSPADM

## 2023-07-16 RX ORDER — 0.9 % SODIUM CHLORIDE 0.9 %
10 VIAL (ML) INJECTION PRN
Status: CANCELLED | OUTPATIENT
Start: 2023-07-16

## 2023-07-16 RX ORDER — 0.9 % SODIUM CHLORIDE 0.9 %
VIAL (ML) INJECTION PRN
Status: CANCELLED | OUTPATIENT
Start: 2023-07-16

## 2023-07-16 RX ADMIN — AMPICILLIN SODIUM AND SULBACTAM SODIUM 3 G: 2; 1 INJECTION, POWDER, FOR SOLUTION INTRAMUSCULAR; INTRAVENOUS at 11:23

## 2023-07-16 RX ADMIN — AMPICILLIN SODIUM AND SULBACTAM SODIUM 3 G: 2; 1 INJECTION, POWDER, FOR SOLUTION INTRAMUSCULAR; INTRAVENOUS at 17:26

## 2023-07-16 RX ADMIN — ENOXAPARIN SODIUM 40 MG: 100 INJECTION SUBCUTANEOUS at 17:26

## 2023-07-16 RX ADMIN — AMPICILLIN SODIUM AND SULBACTAM SODIUM 3 G: 2; 1 INJECTION, POWDER, FOR SOLUTION INTRAMUSCULAR; INTRAVENOUS at 05:24

## 2023-07-16 RX ADMIN — DAPTOMYCIN 570 MG: 500 INJECTION, POWDER, LYOPHILIZED, FOR SOLUTION INTRAVENOUS at 06:18

## 2023-07-16 RX ADMIN — AMPICILLIN SODIUM AND SULBACTAM SODIUM 3 G: 2; 1 INJECTION, POWDER, FOR SOLUTION INTRAMUSCULAR; INTRAVENOUS at 23:52

## 2023-07-16 ASSESSMENT — ENCOUNTER SYMPTOMS
SORE THROAT: 0
BLOOD IN STOOL: 0
ABDOMINAL PAIN: 0
WEAKNESS: 0
HEADACHES: 0
PALPITATIONS: 0
NERVOUS/ANXIOUS: 1
HALLUCINATIONS: 0
DIARRHEA: 0
FOCAL WEAKNESS: 0
BACK PAIN: 0
COUGH: 0
DEPRESSION: 0
MYALGIAS: 0
NAUSEA: 0
FEVER: 0
DIZZINESS: 0
CHILLS: 0
SHORTNESS OF BREATH: 0
VOMITING: 0
HEARTBURN: 0

## 2023-07-16 ASSESSMENT — PAIN DESCRIPTION - PAIN TYPE
TYPE: SURGICAL PAIN
TYPE: SURGICAL PAIN

## 2023-07-16 NOTE — PROGRESS NOTES
Hospital Medicine Daily Progress Note    Date of Service  7/16/2023    Chief Complaint  Esequiel Molina is a 33 y.o. male admitted 7/12/2023 with foot wound with drainage and foul odor    Hospital Course  History of alcoholic cirrhosis, sober for 2 years, type 2 diabetes admitted for left foot pain redness, wound and drainage.  He was found to have an abscess on imaging and likely surgery was consulted.  They performed an I&D on 7/12 with cultures were taken.  He started empiric IV antibiotics syncope.  Visual 7/13:    Interval Problem Update  7/13: MRI performed and confirms osteomyelitis and cellulitis.  He will likely need at least a partial amputation and will need ID consultation for IV antibiotics.  Pending final Ortho recommendations.  Continue IV Vanco and Unasyn.  Sugars are not at goal, Lantus added    7/14: Patient declining surgery, ID consulted for antibiotic treatment.  Cultures with MSSA.  Positive blood culture from 7/12 (strep), repeat drawn.  Sugars improved with Lantus.  Phos low, replaced    7/15: AM sugar still elevated Lantus increased.  Operative cultures are polymicrobial, daptomycin added.  End date 8/25 7/16: Sugars are at goal, tolerating Dapto/Unasyn.  Outpatient wound care ordered.  PICC line to be placed tomorrow    I have discussed this patient's plan of care and discharge plan at IDT rounds today with Case Management, Nursing, Nursing leadership, and other members of the IDT team.    Consultants/Specialty  orthopedics    Code Status  Full Code    Disposition  The patient is not medically cleared for discharge to home or a post-acute facility.  Anticipate discharge to: home with close outpatient follow-up    I have placed the appropriate orders for post-discharge needs.    Review of Systems  Review of Systems   Constitutional:  Negative for chills, fever and malaise/fatigue.   HENT:  Negative for sore throat.    Respiratory:  Negative for cough and shortness of breath.     Cardiovascular:  Negative for chest pain and palpitations.   Gastrointestinal:  Negative for abdominal pain, blood in stool, diarrhea, heartburn, nausea and vomiting.   Genitourinary:  Negative for dysuria and frequency.   Musculoskeletal:  Negative for back pain and myalgias.   Skin:         Still some drainage from wound, no pain   Neurological:  Negative for dizziness, focal weakness, weakness and headaches.   Psychiatric/Behavioral:  Negative for depression and hallucinations. The patient is nervous/anxious.    All other systems reviewed and are negative.       Physical Exam  Temp:  [36.7 °C (98 °F)-36.9 °C (98.4 °F)] 36.7 °C (98.1 °F)  Pulse:  [66-79] 66  Resp:  [16-18] 16  BP: (124-127)/(71-84) 126/84  SpO2:  [94 %-99 %] 98 %    Physical Exam  Constitutional:       Appearance: Normal appearance.   HENT:      Head: Normocephalic and atraumatic.      Nose: Nose normal.      Mouth/Throat:      Mouth: Mucous membranes are moist.   Eyes:      Extraocular Movements: Extraocular movements intact.      Pupils: Pupils are equal, round, and reactive to light.   Cardiovascular:      Rate and Rhythm: Normal rate and regular rhythm.      Heart sounds: No murmur heard.  Pulmonary:      Effort: Pulmonary effort is normal. No respiratory distress.      Breath sounds: Normal breath sounds. No stridor.   Abdominal:      General: Abdomen is flat. Bowel sounds are normal. There is no distension.      Palpations: Abdomen is soft.      Tenderness: There is no abdominal tenderness.   Musculoskeletal:         General: No swelling, tenderness or deformity.      Cervical back: Normal range of motion and neck supple.      Comments: Dressing c/d/I   Skin:     General: Skin is warm and dry.      Coloration: Skin is not pale.   Neurological:      General: No focal deficit present.      Mental Status: He is alert and oriented to person, place, and time. Mental status is at baseline.   Psychiatric:         Speech: Speech is rapid and  pressured.         Behavior: Behavior normal.         Fluids    Intake/Output Summary (Last 24 hours) at 7/16/2023 1054  Last data filed at 7/16/2023 0841  Gross per 24 hour   Intake 660 ml   Output --   Net 660 ml         Laboratory  Recent Labs     07/14/23  0346 07/16/23  0152   WBC 7.0 5.5   RBC 3.66* 4.01*   HEMOGLOBIN 10.4* 11.2*   HEMATOCRIT 30.7* 34.2*   MCV 83.9 85.3   MCH 28.4 27.9   MCHC 33.9 32.7   RDW 40.2 40.9   PLATELETCT 85* 107*   MPV 12.6 12.3       Recent Labs     07/14/23  0346 07/15/23  0144 07/16/23  0152   SODIUM 136 136 139   POTASSIUM 3.4* 3.4* 3.6   CHLORIDE 102 101 103   CO2 22 22 25   GLUCOSE 151* 162* 139*   BUN 13 10 8   CREATININE 0.64 0.62 0.61   CALCIUM 8.1* 7.8* 8.4                       Imaging  MR-FOOT-WITH & W/O LEFT   Final Result      1.  Limited study due to extensive patient motion artifact and failed fat saturation.      2.  Some marrow edema and enhancement of the first metatarsal and the first proximal phalanx likely representing osteomyelitis.      3.  Extensive cellulitis involving the great toe with extension into the mid and forefoot most prominently medially. There are areas of soft tissue ulceration seen as well.      DX-ANKLE 3+ VIEWS LEFT   Final Result      1.  There is diffuse swelling of the left ankle and hindfoot with no underlying osteomyelitis.      DX-FOOT-COMPLETE 3+ LEFT   Final Result      1.  There is diffuse swelling of the left foot predominantly adjacent to the 1st metatarsal with soft tissue air lucencies suggestive of cellulitis or potentially abscess.   2.  No radiographic evidence of osteomyelitis.      DX-CHEST-PORTABLE (1 VIEW)   Final Result      1.  There is no acute cardiopulmonary process.      IR-PICC LINE PLACEMENT W/ GUIDANCE > AGE 5    (Results Pending)          Assessment/Plan  * Abscess of left foot- (present on admission)  Assessment & Plan  Chronic DM ulcer which was healing with wound care, then he developed pain, swelling,  fever  Imaging showed abscess formation with gas suspicious for necrotizing fasciitis s/p I&D by Dr. Ponce on 7/12  Operative cultures are polymicrobial  MRI shows osteomyelitis-patient declined any further orthopedic surgery  D/w ID- will need 6 wks IV abx  Continue Unasyn, add Dapto (to cover Corynebacterium) through 8/25  Strict diabetic control  Place PICC today, repeat blood cx negative    Bacteremia  Assessment & Plan  Real versus contaminant  Strep sp + in both cultures from 7/12  Repeat blood cultures drawn on 7/14 -negative    Hypophosphatemia  Assessment & Plan  Improved    Acute osteomyelitis of left foot (HCC)  Assessment & Plan  Due to infected diabetic ulcer with underlying abscess status post washout on 7/12  Cultures are growing MSSA/polymicrobial  Patient declining orthopedic intervention at this time and would like to opt for nonoperative treatment despite the increased risk of recurrent infection  I discussed with ID -anticipate 6 weeks of IV antibiotic therapy throug 8/25  Continue Unasyn + Dapto  Place PICC will get [facility] infusions  Heel weightbearing  Order OP wound care    Normochromic normocytic anemia  Assessment & Plan  Chronic disease and pancytopenia from cirrhosis contributing  No e/o bleeding  Monitor    Metabolic acidosis  Assessment & Plan  Resolved    Diabetes mellitus type II, controlled (HCC)  Assessment & Plan  Sugars are at goal last 24 hours  A1C of 5.8, neuropathy from DM contributing   Continue lantus 7U   continue sliding scale   -diabetic diet    Hyponatremia- (present on admission)  Assessment & Plan  Most likely secondary to the infection and hyperglycemia  Improved with fluids  A1c has increased slightly but is still in the nondiabetic range at 5.8    Portal venous hypertension (HCC)- (present on admission)  Assessment & Plan  History of portal hypertension  Continue blood pressure management using beta-blockers prn    Alcoholic hepatitis with ascites- (present on  admission)  Assessment & Plan  History of alcoholic hepatitis and he has been in remission for 2-1/2 years  He demonstrates pancytopenia, chronic and stable  Continue to monitor         VTE prophylaxis: SCDs/TEDs, lovenox    I have performed a physical exam and reviewed and updated ROS and Plan today (7/16/2023). In review of yesterday's note (7/15/2023), there are no changes except as documented above.

## 2023-07-16 NOTE — DISCHARGE PLANNING
Case Management Discharge Planning    Admission Date: 7/12/2023  GMLOS: 5  ALOS: 4    6-Clicks ADL Score: 24  6-Clicks Mobility Score: 22      Anticipated Discharge Dispo: Discharge Disposition: Discharged to home/self care (01)    DME Needed: No    Action(s) Taken: LSW spoke with Chasidy Marquez at [facility]. They will need an RPA to be completed on Monday in order to schedule pt for outpatient infusions. LSW attempted to call [facility], however they are not open on the weekend. Left VM with callback ext.    Escalations Completed: None    Medically Clear: No    Next Steps: Care coordination will f/u with [facility] and wound center    Barriers to Discharge: Medical clearance and Outpatient Infusion required, wound care, uninsured    Is the patient up for discharge tomorrow: No

## 2023-07-16 NOTE — CARE PLAN
The patient is Stable - Low risk of patient condition declining or worsening    Shift Goals  Clinical Goals: Patient shall not experience a fall overnight  Patient Goals: Rest/sleep overnight  Family Goals: n/a    Progress made toward(s) clinical / shift goals:  Patient steady with FWW. Ambulates independanty    Patient is not progressing towards the following goals:

## 2023-07-16 NOTE — PROGRESS NOTES
Brief ID note:    Chart reviewed.  Blood cultures now speciated to Streptococcus intermedius.    Pertinent diagnoses:  Streptococcus intermedius bacteremia,  secondary to foot infection  -Blood cultures on 7/12 + Streptococcus intermedius (confirmed Gram stain with microbiology department)   -Repeat blood culture on 7/14-negative to date  Cellulitis of left great toe and midfoot, abscess  Osteomyelitis per MRI of first metatarsal and first proximal phalanx  -OR on 7/12 for I&D with drainage of abscess from plantar and dorsal aspects of the left foot.  Per op note very foul odor and significant purulence, cultures were obtained.  Polymicrobial bacterial infection  -Wound cultures on 7/12 +MSSA  - OR cultures from 7/12 +GPCs   Diabetes mellitus  Alcoholic cirrhosis  History of alcohol abuse  Thrombocytopenia    Plan:  - Continue IV daptomycin 6 mg/kg daily to cover corynebacterium  -Monitor CPK while on IV daptomycin.  CPK 41 today  -Continue IV Unasyn 3 g every 6 hours  -Follow wound cultures on 7/12+ MSSA  -Status post I&D with drainage of abscess on 7/12/2023.  Per the procedure note, there was significant purulence noted and the wound bed probed all the way down to the lateral aspect of the dorsal forefoot and tracked medially communicating with the plantar ulcer.  -Follow OR culture on 7/12+ Streptococcus intermedius, MSSA, corynebacterium stratum group, ampicillin susceptible Enterococcus faecalis, prevotella disiens, bacteroides ovatus group and Pasteurella canis oralis. Spoke with [employer]  7/15 requesting Corynebacterium striatum group susceptibilities-this is a send out  -Blood culture on 7/12+ Streptococcus intermedius  -Repeat blood cultures on 7/14-negative to date  -Anticipate a 6-week course of antibiotics from date of first negative blood culture on 7/14 with stop date of 8/25/2023  -Blood sugar control  -Okay with PICC line once repeat blood cultures have been negative for at least 48  hours-ordered for tomorrow 7/17  -[facility] orders for IV daptomycin 6 mg/kg daily and ertapenem 1 g daily to complete antibiotic course above done today  -Continue wound care  -Blood sugar control     This infection poses a serious threat to limb function     Disposition: Patient has no insurance.  [facility]     PICC line: Yes     Follow-up in the ID clinic-okay to schedule with ARJUN Poon    Discussed with internal medicine/Dr. Nina.  ID signing off.  Please reconsult if needed

## 2023-07-16 NOTE — CARE PLAN
The patient is Stable - Low risk of patient condition declining or worsening    Shift Goals  Clinical Goals: patient will be free from fall throughout the shift  Patient Goals: able to sleep comfortably  Family Goals: n/a    Progress made toward(s) clinical / shift goals:  Patient is free from fall, Fall precautions observe. Patient is very independent, uses FWW in ambulation. Per patient when asked about DME walker or crutches, he mentioned he will be the one to take care of it once DC.       Patient is not progressing towards the following goals:    Problem: Physical Regulation  Goal: Diagnostic test results will improve  Outcome: Progressing     Problem: Infection - Standard  Goal: Patient will remain free from infection  Outcome: Progressing     Problem: Wound/ / Incision Healing  Goal: Patient's wound/surgical incision will decrease in size and heals properly  Outcome: Progressing     Problem: Fall Risk  Goal: Patient will remain free from falls  Outcome: Progressing

## 2023-07-16 NOTE — PROGRESS NOTES
BSSR received from day shift RN. Patient laying in bed in no apparent distress with no complaints. A&O X4. Plan of care discussed with patient. Bed in low position with bed brakes applied and call light in reach. Patient states no needs at this time.     2151- Patient laying in bed watching TV in no apparent distress    0015- Patient laying in bed in no apparent distress. Patient up to bathroom and back. ABX completed. IV SL    0154- Patient laying in bed in no apparent distress. Lab just in to do lab draw    0401- Patient appears to be sleeping comfortably, easily aroused     0600- Patient appears to be sleeping comfortably, easily aroused, recently medicated

## 2023-07-16 NOTE — PROGRESS NOTES
Hourly Rounding  0800-Breakfast tray collected  1000-Vitals taken  1215- pt eating lunch  1400-Pt relaxing in bed  1600-Vitals taken  1800-Pt eating dinner  1900- RN to night CNA report given.

## 2023-07-16 NOTE — PROGRESS NOTES
Received patient from Night shift RN . Patient is awake and alert.No sign of distress. Presently eating his breakfast.Dressing to left foot in place,dry and intact. Fall precautions observe, kept bed in lowest position and call light within reach.     0900- patient is comfortably lying in his bed, awake and playing his game, all needs attended    1050- checked patients BS, within normal range    1100- updated patient, PICC line insertion will be tomorrow.    1123- due medication given as per MAR    1418- dressing to left foot changed as per order    1635- BS checked, no coverage    1726- due medications  given as ordered

## 2023-07-17 ENCOUNTER — APPOINTMENT (OUTPATIENT)
Dept: RADIOLOGY | Facility: MEDICAL CENTER | Age: 33
DRG: 854 | End: 2023-07-17
Attending: INTERNAL MEDICINE

## 2023-07-17 LAB
GLUCOSE BLD STRIP.AUTO-MCNC: 100 MG/DL (ref 65–99)
GLUCOSE BLD STRIP.AUTO-MCNC: 117 MG/DL (ref 65–99)
GLUCOSE BLD STRIP.AUTO-MCNC: 129 MG/DL (ref 65–99)

## 2023-07-17 PROCEDURE — 94760 N-INVAS EAR/PLS OXIMETRY 1: CPT

## 2023-07-17 PROCEDURE — 700105 HCHG RX REV CODE 258: Performed by: HOSPITALIST

## 2023-07-17 PROCEDURE — 700111 HCHG RX REV CODE 636 W/ 250 OVERRIDE (IP): Mod: JZ | Performed by: INTERNAL MEDICINE

## 2023-07-17 PROCEDURE — 700105 HCHG RX REV CODE 258: Performed by: INTERNAL MEDICINE

## 2023-07-17 PROCEDURE — 82962 GLUCOSE BLOOD TEST: CPT

## 2023-07-17 PROCEDURE — 700111 HCHG RX REV CODE 636 W/ 250 OVERRIDE (IP): Mod: JZ | Performed by: HOSPITALIST

## 2023-07-17 PROCEDURE — 36573 INSJ PICC RS&I 5 YR+: CPT

## 2023-07-17 PROCEDURE — 99232 SBSQ HOSP IP/OBS MODERATE 35: CPT | Performed by: INTERNAL MEDICINE

## 2023-07-17 PROCEDURE — 770001 HCHG ROOM/CARE - MED/SURG/GYN PRIV*

## 2023-07-17 RX ADMIN — DAPTOMYCIN 570 MG: 500 INJECTION, POWDER, LYOPHILIZED, FOR SOLUTION INTRAVENOUS at 05:35

## 2023-07-17 RX ADMIN — AMPICILLIN SODIUM AND SULBACTAM SODIUM 3 G: 2; 1 INJECTION, POWDER, FOR SOLUTION INTRAMUSCULAR; INTRAVENOUS at 23:45

## 2023-07-17 RX ADMIN — AMPICILLIN SODIUM AND SULBACTAM SODIUM 3 G: 2; 1 INJECTION, POWDER, FOR SOLUTION INTRAMUSCULAR; INTRAVENOUS at 11:34

## 2023-07-17 RX ADMIN — AMPICILLIN SODIUM AND SULBACTAM SODIUM 3 G: 2; 1 INJECTION, POWDER, FOR SOLUTION INTRAMUSCULAR; INTRAVENOUS at 17:52

## 2023-07-17 RX ADMIN — AMPICILLIN SODIUM AND SULBACTAM SODIUM 3 G: 2; 1 INJECTION, POWDER, FOR SOLUTION INTRAMUSCULAR; INTRAVENOUS at 06:14

## 2023-07-17 RX ADMIN — ENOXAPARIN SODIUM 40 MG: 100 INJECTION SUBCUTANEOUS at 17:52

## 2023-07-17 ASSESSMENT — ENCOUNTER SYMPTOMS
HEADACHES: 0
DEPRESSION: 0
COUGH: 0
NERVOUS/ANXIOUS: 1
DIARRHEA: 0
FEVER: 0
HALLUCINATIONS: 0
NAUSEA: 0
CHILLS: 0
HEARTBURN: 0
SORE THROAT: 0
BLOOD IN STOOL: 0
WEAKNESS: 0
DIZZINESS: 0
VOMITING: 0
CLAUDICATION: 0
FOCAL WEAKNESS: 0
SHORTNESS OF BREATH: 0
MYALGIAS: 0
BACK PAIN: 0
PALPITATIONS: 0
ABDOMINAL PAIN: 0

## 2023-07-17 ASSESSMENT — PAIN DESCRIPTION - PAIN TYPE
TYPE: SURGICAL PAIN
TYPE: SURGICAL PAIN

## 2023-07-17 NOTE — CARE PLAN
The patient is Stable - Low risk of patient condition declining or worsening    Shift Goals  Clinical Goals: Patients pain to be controlled at 3/10 or less post intervention.  Patient Goals: Sleep, rest overnight  Family Goals: n/a    Progress made toward(s) clinical / shift goals:  Patient has had no reports of pain overnight    Patient is not progressing towards the following goals:

## 2023-07-17 NOTE — DISCHARGE PLANNING
Case Management Discharge Planning    Admission Date: 7/12/2023  GMLOS: 5  ALOS: 5    6-Clicks ADL Score: 24  6-Clicks Mobility Score: 22      Anticipated Discharge Dispo: Discharge Disposition: Discharged to home/self care (01)    DME Needed: No    Action(s) Taken: Updated Provider/Nurse on Discharge Plan    SW/CM services ongoing.  Patient discussed during morning IDT rounds with team. Patient is not medically cleared for discharge at this time.    Outpatient infusion clinic ([facility]) and outpatient wound care services need to be coordinated prior to being discharge. FCO placed call to [facility] X [phone]  states they will callback and provide the costs so we complete RPA in order to  scheduled pt for an appointment. Patient is currently uninsured.    FCO also placed call to wound care clinic X [phone], they have appointments next week , however patient is uninsured , therefore, patient will be financially responsible for costs.     FCO sent message to PFA team to follow up for Financial application.     SW will continue to follow up for safe disposition.    1139-FCO met with patient at bedside, patient identified  by self ID and wristband. FCO introduced self, explained role and reason for visit.   FCO following up on outpatient appointments to the wound care clinic and the infusion center.  Per conversation with the patient, to go ahead and schedule the appointments and he is aware of financial responsibilities.    1309- Stll waiting for [facility] to provide quote of IV ABX.    1500 - Received quotes from [facility] for both Ertapenem and Daptomycin IV ABX Submitted RPA to Leadership.     Outpatient wound care appointment has been scheduled:    [address]   Thursday 7/20/2023 @7:30 am.    Nursing team is updated.      Escalations Completed: None    Medically Clear: No    Next Steps: pending medical clearance    Barriers to Discharge: Medical clearance and Outpatient Infusion required    Is the patient up for discharge  tomorrow: No

## 2023-07-17 NOTE — PROGRESS NOTES
1910- BSSR received from day shift RN. Patient laying in bed in no apparent distress with no complaints. A&O X4. Plan of care discussed with patient. Bed in low position with bed brakes applied and call light in reach. Patient states no needs at this time.     2225- Patient laying in bed watching TV    0016- Patient appears to be sleeping comfortably, easily aroused ABX started    0210- Patient appears to be sleeping comfortably, easily aroused     0348-Patient laying in bed watching TV in no apparent distress.      0632- Patient medicated earlier.

## 2023-07-17 NOTE — PROGRESS NOTES
Received patient from Night shift RN . Patient is awake and alert.No sign of distress. Patient denies any n/v or pain. Dressing to left foot in place, cdi. Fall precautions observe, kept bed in lowest position and call light within reach.     0914-Patient is off to floor    1050- patient is back to floor    1100- BS checked, no coverage    1134-Due medication given as per MAR    1230- patient is eating his lunch.No complaints noted    1630- Dressing changed to left foot

## 2023-07-17 NOTE — PROGRESS NOTES
[facility] Medicine Daily Progress Note    Date of Service  7/17/2023    Chief Complaint  Esequiel Molnia is a 33 y.o. male admitted 7/12/2023 with foot wound with drainage and foul odor    Hospital Course  History of alcoholic cirrhosis, sober for 2 years, type 2 diabetes admitted for left foot pain redness, wound and drainage.  He was found to have an abscess on imaging and likely surgery was consulted.  They performed an I&D on 7/12 with cultures were taken.  He started empiric IV antibiotics syncope.  Visual 7/13:    Interval Problem Update  7/13: MRI performed and confirms osteomyelitis and cellulitis.  He will likely need at least a partial amputation and will need ID consultation for IV antibiotics.  Pending final Ortho recommendations.  Continue IV Vanco and Unasyn.  Sugars are not at goal, Lantus added    7/14: Patient declining surgery, ID consulted for antibiotic treatment.  Cultures with MSSA.  Positive blood culture from 7/12 (strep), repeat drawn.  Sugars improved with Lantus.  Phos low, replaced    7/15: AM sugar still elevated Lantus increased.  Operative cultures are polymicrobial, daptomycin added.  End date 8/25 7/16: Sugars are at goal, tolerating Dapto/Unasyn.  Outpatient wound care ordered.  PICC line to be placed tomorrow    7/17: PICC line placed, pending OP  infusion estimate in order to secure funding    I have discussed this patient's plan of care and discharge plan at IDT rounds today with Case Management, Nursing, Nursing leadership, and other members of the IDT team.    Consultants/Specialty  orthopedics    Code Status  Full Code    Disposition  The patient is not medically cleared for discharge to home or a post-acute facility.  Anticipate discharge to: home with close outpatient follow-up    I have placed the appropriate orders for post-discharge needs.    Review of Systems  Review of Systems   Constitutional:  Negative for chills, fever and malaise/fatigue.   HENT:  Negative for sore  throat.    Respiratory:  Negative for cough and shortness of breath.    Cardiovascular:  Negative for chest pain, palpitations and claudication.   Gastrointestinal:  Negative for abdominal pain, blood in stool, diarrhea, heartburn, nausea and vomiting.   Genitourinary:  Negative for dysuria and frequency.   Musculoskeletal:  Negative for back pain and myalgias.   Skin:         Still some drainage from wound, no pain   Neurological:  Negative for dizziness, focal weakness, weakness and headaches.   Psychiatric/Behavioral:  Negative for depression and hallucinations. The patient is nervous/anxious.    All other systems reviewed and are negative.       Physical Exam  Temp:  [36.3 °C (97.4 °F)-37.4 °C (99.4 °F)] 36.8 °C (98.2 °F)  Pulse:  [69-85] 69  Resp:  [14-20] 18  BP: (130-154)/(77-91) 131/89  SpO2:  [91 %-98 %] 98 %    Physical Exam  Constitutional:       Appearance: Normal appearance.   HENT:      Head: Normocephalic and atraumatic.      Nose: Nose normal.      Mouth/Throat:      Mouth: Mucous membranes are moist.   Eyes:      Extraocular Movements: Extraocular movements intact.      Pupils: Pupils are equal, round, and reactive to light.   Cardiovascular:      Rate and Rhythm: Normal rate and regular rhythm.      Heart sounds: No murmur heard.  Pulmonary:      Effort: Pulmonary effort is normal. No respiratory distress.      Breath sounds: Normal breath sounds. No stridor.   Abdominal:      General: Abdomen is flat. Bowel sounds are normal. There is no distension.      Palpations: Abdomen is soft.      Tenderness: There is no abdominal tenderness.   Musculoskeletal:         General: No swelling, tenderness or deformity.      Cervical back: Normal range of motion and neck supple.      Comments: Dressing c/d/I   Skin:     General: Skin is warm and dry.      Coloration: Skin is not pale.   Neurological:      General: No focal deficit present.      Mental Status: He is alert and oriented to person, place, and time.  Mental status is at baseline.   Psychiatric:         Speech: Speech is rapid and pressured.         Behavior: Behavior normal.         Fluids    Intake/Output Summary (Last 24 hours) at 7/17/2023 1148  Last data filed at 7/17/2023 0900  Gross per 24 hour   Intake 800 ml   Output --   Net 800 ml         Laboratory  Recent Labs     07/16/23  0152   WBC 5.5   RBC 4.01*   HEMOGLOBIN 11.2*   HEMATOCRIT 34.2*   MCV 85.3   MCH 27.9   MCHC 32.7   RDW 40.9   PLATELETCT 107*   MPV 12.3       Recent Labs     07/15/23  0144 07/16/23  0152   SODIUM 136 139   POTASSIUM 3.4* 3.6   CHLORIDE 101 103   CO2 22 25   GLUCOSE 162* 139*   BUN 10 8   CREATININE 0.62 0.61   CALCIUM 7.8* 8.4                       Imaging  IR-PICC LINE PLACEMENT W/ GUIDANCE > AGE 5   Final Result                  Ultrasound-guided PICC placement performed by qualified nursing staff as    above.          MR-FOOT-WITH & W/O LEFT   Final Result      1.  Limited study due to extensive patient motion artifact and failed fat saturation.      2.  Some marrow edema and enhancement of the first metatarsal and the first proximal phalanx likely representing osteomyelitis.      3.  Extensive cellulitis involving the great toe with extension into the mid and forefoot most prominently medially. There are areas of soft tissue ulceration seen as well.      DX-ANKLE 3+ VIEWS LEFT   Final Result      1.  There is diffuse swelling of the left ankle and hindfoot with no underlying osteomyelitis.      DX-FOOT-COMPLETE 3+ LEFT   Final Result      1.  There is diffuse swelling of the left foot predominantly adjacent to the 1st metatarsal with soft tissue air lucencies suggestive of cellulitis or potentially abscess.   2.  No radiographic evidence of osteomyelitis.      DX-CHEST-PORTABLE (1 VIEW)   Final Result      1.  There is no acute cardiopulmonary process.             Assessment/Plan  * Abscess of left foot- (present on admission)  Assessment & Plan  Chronic DM ulcer which was  healing with wound care, then he developed pain, swelling, fever  Imaging showed abscess formation with gas suspicious for necrotizing fasciitis s/p I&D by Dr. Ponce on 7/12  Operative cultures are polymicrobial  MRI shows osteomyelitis-patient declined any further orthopedic surgery  D/w ID- will need 6 wks IV abx  Continue Unasyn, add Dapto (to cover Corynebacterium) through 8/25  Strict diabetic control  Place PICC today, repeat blood cx negative  Pending OP infusion apt and funding    Bacteremia  Assessment & Plan  Real versus contaminant  Strep sp + in both cultures from 7/12  Repeat blood cultures drawn on 7/14 -negative    Hypophosphatemia  Assessment & Plan  Improved    Acute osteomyelitis of left foot (HCC)  Assessment & Plan  Due to infected diabetic ulcer with underlying abscess status post washout on 7/12  Cultures are growing MSSA/polymicrobial  Patient declining orthopedic intervention at this time and would like to opt for nonoperative treatment despite the increased risk of recurrent infection  I discussed with ID -anticipate 6 weeks of IV antibiotic therapy throug 8/25  Continue Unasyn + Dapto  Place PICC will get OPIC infusions- pending funding coverage/estimate  Heel weightbearing  Ordered OP wound care    Normochromic normocytic anemia  Assessment & Plan  Chronic disease and pancytopenia from cirrhosis contributing  No e/o bleeding  Monitor    Metabolic acidosis  Assessment & Plan  Resolved    Diabetes mellitus type II, controlled (HCC)  Assessment & Plan  Sugars are at goal last 24 hours  A1C of 5.8, neuropathy from DM contributing   Continue lantus 7U   continue sliding scale   -diabetic diet    Hyponatremia- (present on admission)  Assessment & Plan  Resolved    Portal venous hypertension (HCC)- (present on admission)  Assessment & Plan  History of portal hypertension  Continue blood pressure management using beta-blockers prn    Alcoholic hepatitis with ascites- (present on  admission)  Assessment & Plan  History of alcoholic hepatitis and he has been in remission for 2-1/2 years  He demonstrates pancytopenia, chronic and stable  Continue to monitor         VTE prophylaxis: SCDs/TEDs, lovenox    I have performed a physical exam and reviewed and updated ROS and Plan today (7/17/2023). In review of yesterday's note (7/16/2023), there are no changes except as documented above.

## 2023-07-17 NOTE — CARE PLAN
The patient is Stable - Low risk of patient condition declining or worsening    Shift Goals  Clinical Goals: Patient will be able to get PICC line today  Patient Goals: able to go home  Family Goals: n/a    Progress made toward(s) clinical / shift goals:  Patient gets his PICC line today, still awaiting outpatient  antibiotic  to be set up    Patient is not progressing towards the following goals:    Problem: Physical Regulation  Goal: Diagnostic test results will improve  Outcome: Progressing     Problem: Infection - Standard  Goal: Patient will remain free from infection  Outcome: Progressing     Problem: Wound/ / Incision Healing  Goal: Patient's wound/surgical incision will decrease in size and heals properly  Outcome: Progressing     Problem: Fall Risk  Goal: Patient will remain free from falls  Outcome: Progressing

## 2023-07-17 NOTE — PROCEDURES
Vascular Access Team     Date of Insertion: 7/17/2023   Arm Circumference: 29.5  Internal length: 48  External Length: 3  Vein Occupancy %: 33   Reason for PICC: antibiotics  Labs: WBC 5.5, , BUN 8, Cr 0.61, , INR 1.48     Consents confirmed, vessel patency confirmed with ultrasound. Risks and benefits of procedure explained to patient and education regarding central line associated bloodstream infections provided. Questions answered.      PICC placed in LUE per licensed provider order with ultrasound guidance.  4 Fr, single lumen PICC placed in Basilic vein after 2 attempt(s). 1 mL of 1% lidocaine injected intradermally at the insertion site. A 21 gauge microintroducer needle was visualized entering the vein and modified Seldinger technique was used to obtain access to the vein. 48 cm catheter inserted and brisk blood return was observed from each lumen upon aspiration. Line secured at the 3 cm marker. TCS stylet removed and observed to be fully intact. Each lumen flushed using pulsatile method without resistance with 10 mL 0.9% normal saline. PICC line secured with Biopatch and Tegaderm.     PICC tip placement location is confirmed by nurse to be in the Superior Vena Cava (SVC) utilizing 3CG technology. PICC line is appropriate for use at this time. Patient tolerated procedure well, without complications.  Patient condition relayed to primary RN or ordering physician via this post procedure note in the EMR.      Ultrasound images uploaded to PACS and viewable in the EMR - yes  Ultrasound imaged printed and placed in paper chart - no     BARD Power PICC ref # [id number], Lot # [id number], Expiration Date 2024-06-30

## 2023-07-18 ENCOUNTER — PATIENT OUTREACH (OUTPATIENT)
Dept: SCHEDULING | Facility: IMAGING CENTER | Age: 33
End: 2023-07-18

## 2023-07-18 ENCOUNTER — APPOINTMENT (OUTPATIENT)
Dept: CARDIOLOGY | Facility: MEDICAL CENTER | Age: 33
DRG: 854 | End: 2023-07-18
Attending: FAMILY MEDICINE

## 2023-07-18 LAB
ALBUMIN SERPL BCP-MCNC: 3.3 G/DL (ref 3.2–4.9)
BASOPHILS # BLD AUTO: 0.5 % (ref 0–1.8)
BASOPHILS # BLD: 0.03 K/UL (ref 0–0.12)
BUN SERPL-MCNC: 7 MG/DL (ref 8–22)
CALCIUM ALBUM COR SERPL-MCNC: 9.5 MG/DL (ref 8.5–10.5)
CALCIUM SERPL-MCNC: 8.9 MG/DL (ref 8.4–10.2)
CHLORIDE SERPL-SCNC: 101 MMOL/L (ref 96–112)
CO2 SERPL-SCNC: 27 MMOL/L (ref 20–33)
CREAT SERPL-MCNC: 0.73 MG/DL (ref 0.5–1.4)
EOSINOPHIL # BLD AUTO: 0.17 K/UL (ref 0–0.51)
EOSINOPHIL NFR BLD: 2.9 % (ref 0–6.9)
ERYTHROCYTE [DISTWIDTH] IN BLOOD BY AUTOMATED COUNT: 42.9 FL (ref 35.9–50)
GFR SERPLBLD CREATININE-BSD FMLA CKD-EPI: 123 ML/MIN/1.73 M 2
GLUCOSE BLD STRIP.AUTO-MCNC: 110 MG/DL (ref 65–99)
GLUCOSE BLD STRIP.AUTO-MCNC: 110 MG/DL (ref 65–99)
GLUCOSE BLD STRIP.AUTO-MCNC: 112 MG/DL (ref 65–99)
GLUCOSE BLD STRIP.AUTO-MCNC: 92 MG/DL (ref 65–99)
GLUCOSE BLD STRIP.AUTO-MCNC: 99 MG/DL (ref 65–99)
GLUCOSE SERPL-MCNC: 98 MG/DL (ref 65–99)
HCT VFR BLD AUTO: 35.5 % (ref 42–52)
HGB BLD-MCNC: 11.1 G/DL (ref 14–18)
IMM GRANULOCYTES # BLD AUTO: 0.37 K/UL (ref 0–0.11)
IMM GRANULOCYTES NFR BLD AUTO: 6.4 % (ref 0–0.9)
LV EJECT FRACT MOD 2C 99903: 67.44
LV EJECT FRACT MOD 4C 99902: 44.63
LV EJECT FRACT MOD BP 99901: 58.38
LYMPHOCYTES # BLD AUTO: 1.11 K/UL (ref 1–4.8)
LYMPHOCYTES NFR BLD: 19.1 % (ref 22–41)
MAGNESIUM SERPL-MCNC: 1.7 MG/DL (ref 1.5–2.5)
MCH RBC QN AUTO: 28 PG (ref 27–33)
MCHC RBC AUTO-ENTMCNC: 31.3 G/DL (ref 32.3–36.5)
MCV RBC AUTO: 89.4 FL (ref 81.4–97.8)
MONOCYTES # BLD AUTO: 0.56 K/UL (ref 0–0.85)
MONOCYTES NFR BLD AUTO: 9.7 % (ref 0–13.4)
NEUTROPHILS # BLD AUTO: 3.56 K/UL (ref 1.82–7.42)
NEUTROPHILS NFR BLD: 61.4 % (ref 44–72)
NRBC # BLD AUTO: 0 K/UL
NRBC BLD-RTO: 0 /100 WBC (ref 0–0.2)
PHOSPHATE SERPL-MCNC: 3.3 MG/DL (ref 2.5–4.5)
PLATELET # BLD AUTO: 139 K/UL (ref 164–446)
PMV BLD AUTO: 11.4 FL (ref 9–12.9)
POTASSIUM SERPL-SCNC: 3.6 MMOL/L (ref 3.6–5.5)
RBC # BLD AUTO: 3.97 M/UL (ref 4.7–6.1)
SODIUM SERPL-SCNC: 138 MMOL/L (ref 135–145)
WBC # BLD AUTO: 5.8 K/UL (ref 4.8–10.8)

## 2023-07-18 PROCEDURE — 94760 N-INVAS EAR/PLS OXIMETRY 1: CPT

## 2023-07-18 PROCEDURE — 85025 COMPLETE CBC W/AUTO DIFF WBC: CPT

## 2023-07-18 PROCEDURE — 80069 RENAL FUNCTION PANEL: CPT

## 2023-07-18 PROCEDURE — 83735 ASSAY OF MAGNESIUM: CPT

## 2023-07-18 PROCEDURE — 700111 HCHG RX REV CODE 636 W/ 250 OVERRIDE (IP): Mod: JZ | Performed by: INTERNAL MEDICINE

## 2023-07-18 PROCEDURE — 36415 COLL VENOUS BLD VENIPUNCTURE: CPT

## 2023-07-18 PROCEDURE — 99232 SBSQ HOSP IP/OBS MODERATE 35: CPT | Performed by: FAMILY MEDICINE

## 2023-07-18 PROCEDURE — 93306 TTE W/DOPPLER COMPLETE: CPT

## 2023-07-18 PROCEDURE — 99233 SBSQ HOSP IP/OBS HIGH 50: CPT | Performed by: INTERNAL MEDICINE

## 2023-07-18 PROCEDURE — 770001 HCHG ROOM/CARE - MED/SURG/GYN PRIV*

## 2023-07-18 PROCEDURE — 700105 HCHG RX REV CODE 258: Performed by: INTERNAL MEDICINE

## 2023-07-18 PROCEDURE — 700105 HCHG RX REV CODE 258: Performed by: HOSPITALIST

## 2023-07-18 PROCEDURE — 700111 HCHG RX REV CODE 636 W/ 250 OVERRIDE (IP): Mod: JZ | Performed by: HOSPITALIST

## 2023-07-18 PROCEDURE — 82962 GLUCOSE BLOOD TEST: CPT

## 2023-07-18 PROCEDURE — 97597 DBRDMT OPN WND 1ST 20 CM/<: CPT

## 2023-07-18 PROCEDURE — 93306 TTE W/DOPPLER COMPLETE: CPT | Mod: 26 | Performed by: STUDENT IN AN ORGANIZED HEALTH CARE EDUCATION/TRAINING PROGRAM

## 2023-07-18 RX ORDER — GLUCOSAMINE HCL/CHONDROITIN SU 500-400 MG
CAPSULE ORAL
Qty: 100 EACH | Refills: 0 | Status: SHIPPED | OUTPATIENT
Start: 2023-07-18

## 2023-07-18 RX ORDER — LANCETS 30 GAUGE
EACH MISCELLANEOUS
Qty: 100 EACH | Refills: 0 | Status: SHIPPED | OUTPATIENT
Start: 2023-07-18

## 2023-07-18 RX ADMIN — DAPTOMYCIN 570 MG: 500 INJECTION, POWDER, LYOPHILIZED, FOR SOLUTION INTRAVENOUS at 06:14

## 2023-07-18 RX ADMIN — AMPICILLIN SODIUM AND SULBACTAM SODIUM 3 G: 2; 1 INJECTION, POWDER, FOR SOLUTION INTRAMUSCULAR; INTRAVENOUS at 23:30

## 2023-07-18 RX ADMIN — AMPICILLIN SODIUM AND SULBACTAM SODIUM 3 G: 2; 1 INJECTION, POWDER, FOR SOLUTION INTRAMUSCULAR; INTRAVENOUS at 12:56

## 2023-07-18 RX ADMIN — ENOXAPARIN SODIUM 40 MG: 100 INJECTION SUBCUTANEOUS at 17:08

## 2023-07-18 RX ADMIN — AMPICILLIN SODIUM AND SULBACTAM SODIUM 3 G: 2; 1 INJECTION, POWDER, FOR SOLUTION INTRAMUSCULAR; INTRAVENOUS at 05:21

## 2023-07-18 RX ADMIN — AMPICILLIN SODIUM AND SULBACTAM SODIUM 3 G: 2; 1 INJECTION, POWDER, FOR SOLUTION INTRAMUSCULAR; INTRAVENOUS at 17:11

## 2023-07-18 ASSESSMENT — PATIENT HEALTH QUESTIONNAIRE - PHQ9
1. LITTLE INTEREST OR PLEASURE IN DOING THINGS: NOT AT ALL
SUM OF ALL RESPONSES TO PHQ9 QUESTIONS 1 AND 2: 0
2. FEELING DOWN, DEPRESSED, IRRITABLE, OR HOPELESS: NOT AT ALL
2. FEELING DOWN, DEPRESSED, IRRITABLE, OR HOPELESS: NOT AT ALL
1. LITTLE INTEREST OR PLEASURE IN DOING THINGS: NOT AT ALL
SUM OF ALL RESPONSES TO PHQ9 QUESTIONS 1 AND 2: 0

## 2023-07-18 ASSESSMENT — ENCOUNTER SYMPTOMS
DEPRESSION: 0
BACK PAIN: 0
DIZZINESS: 0
CHILLS: 0
NAUSEA: 0
CLAUDICATION: 0
FOCAL WEAKNESS: 0
DIARRHEA: 0
SORE THROAT: 0
HEARTBURN: 0
WEAKNESS: 0
HALLUCINATIONS: 0
FEVER: 0
NERVOUS/ANXIOUS: 1
HEADACHES: 0
ABDOMINAL PAIN: 0
COUGH: 0
BLOOD IN STOOL: 0
VOMITING: 0
MYALGIAS: 0
PALPITATIONS: 0
SHORTNESS OF BREATH: 0

## 2023-07-18 ASSESSMENT — PAIN DESCRIPTION - PAIN TYPE
TYPE: SURGICAL PAIN
TYPE: ACUTE PAIN

## 2023-07-18 NOTE — PROGRESS NOTES
1900: Received report from days shift. Pt A&Ox4. Resting in bed. Denies pain. Reviewed plan of care for the night  1955: Assessment complete, PICC dressing saturated, informed pt will be changed tonight  2032: Blood sugar checked, pt denies pain  2100: Up to bathroom with CNA  2230: PICC dressing changed and wound dressing reinforced  2345: Pt sleeping, respirations even and unlabored, wakes easily for IV antibiotic to start  0015: Sleeping, awakens easily, IV disconnected  0200: Sleeping, respirations even and unlabored  0230: Awake, requesting to shower, IV  and wound wrapped up. Bed linen changed  0305: Labs drawn, pt awake eating snack  0440: Pt sleeping, respirations even and unlabored  0521: Rounded on pt, administered scheduled meds, pt sleeping but woke up easily, no complaints at this time  0620: Blood sugar checked, lantus given, denies pain, awake and eating snack

## 2023-07-18 NOTE — DISCHARGE PLANNING
Case Management Discharge Planning    Admission Date: 7/12/2023  GMLOS: 5  ALOS: 6    6-Clicks ADL Score: 24  6-Clicks Mobility Score: 22      Anticipated Discharge Dispo: Discharge Disposition: Discharged to home/self care (01)    DME Needed: No    Action(s) Taken: Updated Provider/Nurse on Discharge Plan    SW/CM services ongoing.Patient discussed during morning IDT rounds with team. Patient is not medically cleared for discharge at this time.   RPA is approved by Leadership and faxed to [facility].    Per ID team , they will discontinue the Daptomycin. SW waiting for updated orders.    SW will continue to follow for safe disposition.    1417- SW called [facility] X [id number] states they have not yet received revised orders, therefore unable to schedule an appointment for follow up.  Md and ID team is updated.    1500 - SW called [facility] X [id number] spoke with Chasidy Marquez and states they have received the updated therapy plan from the ID team  [facility] appointment : 7/19/2023 @ 6 pm    Medical team is updated.    Escalations Completed: Leadership    Medically Clear: Yes    Next Steps: pending medical clearance    Barriers to Discharge: Medical clearance    Is the patient up for discharge tomorrow: No

## 2023-07-18 NOTE — PROGRESS NOTES
Bedside report received from MALNIA Cortés. Assumed care of patient. Daily plan of care discussed. Pt resting comfortably in bed with no signs of distress noted. Breathing even and unlabored. Patient reports pain level 0/10. Patient reports just waiting on paper to be signed by CM, charge RN's have been aware of signature needed, this RN to follow up. Call light within reach. Bed locked in lowest position. Hourly rounding in place

## 2023-07-18 NOTE — CARE PLAN
"The patient is Stable - Low risk of patient condition declining or worsening    Shift Goals  Clinical Goals: Change PICC dressing, safety, rest >4 hours  Patient Goals: \"get my PICC dressing changed and go home tomorrow\"  Family Goals: n/a    Progress made toward(s) clinical / shift goals:  PICC dressing changed, no pain during the night, able to rest in long intervals      Problem: Pain - Standard  Goal: Alleviation of pain or a reduction in pain to the patient’s comfort goal  Outcome: Progressing     Problem: Knowledge Deficit - Standard  Goal: Patient and family/care givers will demonstrate understanding of plan of care, disease process/condition, diagnostic tests and medications  Outcome: Progressing     Problem: Bowel Elimination  Goal: Establish and maintain regular bowel function  Outcome: Progressing     Problem: Self Care  Goal: Patient will have the ability to perform ADLs independently or with assistance (bathe, groom, dress, toilet and feed)  Outcome: Progressing     Problem: Infection - Standard  Goal: Patient will remain free from infection  Outcome: Progressing     Problem: Wound/ / Incision Healing  Goal: Patient's wound/surgical incision will decrease in size and heals properly  Outcome: Progressing     Problem: Fall Risk  Goal: Patient will remain free from falls  Outcome: Progressing     "

## 2023-07-18 NOTE — WOUND TEAM
"[facility]  Inpatient Services  Wound and Skin Care Follow-up    Admission Date: 7/12/2023     Last order of IP CONSULT TO WOUND CARE was found on 7/14/2023 from Hospital Encounter on 7/12/2023     HPI, PMH, SH: Reviewed    Past Surgical History:   Procedure Laterality Date    INCISION AND DRAINAGE ORTHOPEDIC Left 7/12/2023    Procedure: INCISION AND DRAINAGE foot;  Surgeon: Michael Ponce M.D.;  Location: SURGERY [facility];  Service: Orthopedics     Social History     Tobacco Use    Smoking status: Former     Types: Cigarettes    Smokeless tobacco: Current     Types: Chew   Substance Use Topics    Alcohol use: Not Currently     Chief Complaint   Patient presents with    Wound Infection     Ulcer on the Left ball of foot for many months. Became worse 2 days ago.    Abdominal Pain     He feels the abd pain stems from the inability to eat.     Diagnosis: Abscess of left foot [L02.612]    Unit where seen by Wound Team: 1101/01     WOUND FOLLOW UP RELATED TO:  L foot       WOUND HISTORY:  \"7/13: MRI performed and confirms osteomyelitis and cellulitis.  He will likely need at least a partial amputation and will need ID consultation for IV antibiotics.  Pending final Ortho recommendations.  Continue IV Vanco and Unasyn.  Sugars are not at goal, Lantus added     7/14: Patient declining surgery, ID consulted for antibiotic treatment.  Cultures with MSSA.  Positive blood culture from 7/12 (strep), repeat drawn.  Sugars improved with Lantus.  Phos low, replaced\"           WOUND ASSESSMENT/LDA  Wound 11/09/22 Diabetic Ulcer MTH 1 Left (Active)   Date First Assessed/Time First Assessed: 11/09/22 1522   Wound Approximate Age at First Assessment (Weeks): 1 weeks  Primary Wound Type: Diabetic Ulcer  Location: MTH 1  Laterality: Left      Assessments 7/18/2023  3:00 PM   Wound Image     Site Assessment Red;Yellow   Periwound Assessment Callused;Edema   Margins Defined edges   Closure Secondary intention "   Drainage Amount Scant   Drainage Description Serosanguineous   Treatments Cleansed   Wound Cleansing Approved Wound Cleanser   Periwound Protectant Skin Protectant Wipes to Periwound   Dressing Status Intact   Dressing Changed Changed   Dressing Cleansing/Solutions Not Applicable   Dressing Options Hydrofiber Silver;Nonadhesive Foam;Absorbent Abdominal Pad;Dry Roll Gauze   Dressing Change/Treatment Frequency Daily, and As Needed   NEXT Dressing Change/Treatment Date 07/19/23   Wound Team Following Bi-Weekly   Non-staged Wound Description Full thickness   Wound Length (cm) 2 cm   Wound Width (cm) 2 cm   Wound Depth (cm) 1.2 cm   Wound Surface Area (cm^2) 4 cm^2   Wound Volume (cm^3) 4.8 cm^3   Wound Healing % -20   Shape circular   Wound Odor None   Pulses 2+;DP   Exposed Structures Bone       Wound 07/12/23 Incision Foot Left Penrose,  (Active)   Date First Assessed/Time First Assessed: 07/12/23 1949   Primary Wound Type: Incision  Location: Foot  Laterality: Left  Wound Description (Comments): Penrose,       Assessments 7/18/2023  3:00 PM         Site Assessment Pink;Red;Yellow;Drainage   Periwound Assessment Edema   Margins Defined edges;Attached edges   Closure Secondary intention   Drainage Amount Small   Drainage Description Serosanguineous;Yellow   Treatments Cleansed;CSWD - Conservative Sharp Wound Debridement   Wound Cleansing Approved Wound Cleanser   Periwound Protectant Mepitel;No-sting Skin Prep   Dressing Status Intact   Dressing Changed Changed   Dressing Cleansing/Solutions Not Applicable   Dressing Options Hydrofiber Silver;Nonadhesive Foam;Absorbent Abdominal Pad;Dry Roll Gauze   Dressing Change/Treatment Frequency Daily, and As Needed   NEXT Dressing Change/Treatment Date 07/19/23   NEXT Weekly Photo (Inpatient Only) 07/22/23   Wound Team Following Bi-Weekly   Non-staged Wound Description Full thickness   Wound Length (cm) 5 cm   Wound Width (cm) 1.5 cm   Wound Depth (cm) 1.2 cm   Wound Surface  Area (cm^2) 7.5 cm^2   Wound Volume (cm^3) 9 cm^3   Wound Healing % 77   Shape irregular   Wound Odor None   Pulses 2+;DP   Exposed Structures Bone;Other (Comments);Adipose        Vascular:    INDERJIT:   No results found.    Lab Values:    Lab Results   Component Value Date/Time    WBC 5.8 07/18/2023 03:31 AM    RBC 3.97 (L) 07/18/2023 03:31 AM    HEMOGLOBIN 11.1 (L) 07/18/2023 03:31 AM    HEMATOCRIT 35.5 (L) 07/18/2023 03:31 AM    SEDRATEWES 30 (H) 07/12/2023 03:57 PM    HBA1C 5.8 (H) 07/13/2023 12:09 AM         Culture Results show:  Recent Results (from the past 720 hour(s))   CULTURE WOUND W/ GRAM STAIN    Collection Time: 07/12/23  6:45 PM    Specimen: Wound   Result Value Ref Range    Significant Indicator POS (POS)     Source WND     Site left foot     Culture Result - (A)     Gram Stain Result Many WBCs.  Many Gram positive cocci.       Culture Result Streptococcus intermedius  Moderate growth   (A)     Culture Result (A)      Staphylococcus aureus  Light growth  Methicillin sensitive by screening method      Culture Result Corynebacterium striatum group  Light growth   (A)     Culture Result Enterococcus faecalis  Rare growth   (A)        Susceptibility    Enterococcus faecalis - CONCETTA     Daptomycin 1 Sensitive mcg/mL     Vancomycin 1 Sensitive mcg/mL     Ampicillin <=2 Sensitive mcg/mL     Gent Synergy <=500 Sensitive mcg/mL     Penicillin 2 Sensitive mcg/mL    Staphylococcus aureus - CONCETTA     Azithromycin <=2 Sensitive mcg/mL     Clindamycin <=0.25 Sensitive mcg/mL     Cefazolin <=8 Sensitive mcg/mL     Cefepime <=4 Sensitive mcg/mL     Ceftaroline <=0.5 Sensitive mcg/mL     Daptomycin <=0.5 Sensitive mcg/mL     Erythromycin <=0.25 Sensitive mcg/mL     Ampicillin/sulbactam <=8/4 Sensitive mcg/mL     Oxacillin <=0.25 Sensitive mcg/mL     Pip/Tazobactam <=8 Sensitive mcg/mL     Trimeth/Sulfa <=0.5/9.5 Sensitive mcg/mL     Tetracycline <=4 Sensitive mcg/mL     Vancomycin 1 Sensitive mcg/mL       Pain  Level/Medicated:  None, Tolerated without pain medication       INTERVENTIONS BY WOUND TEAM:  Chart and images reviewed. Discussed with bedside RN. All areas of concern (based on picture review, LDA review and discussion with bedside RN) have been thoroughly assessed. Documentation of areas based on significant findings. This RN in to assess patient. Performed standard wound care which includes appropriate positioning, dressing removal and non-selective debridement. Pictures and measurements obtained weekly if/when required.    Wound:  L hallux  Preparation for Dressing removal: Removed without difficulty  Cleansed/Non-selectively Debrided with:  Wound cleanser  Non-Excisional Conservative Sharp debridement: Dry dead skin to dorsal hallux debrided away using scissors and forceps < 20cm2 debrided down to skin.  No Bleeding noted.  Nuvia wound: Cleansed with Wound cleanser, Prepped with No Sting and Mepitel One  Primary Dressing:  silver hydrofiber to wound beds  Secondary (Outer) Dressing: non-adhesive foam, abd pad secured with roll gauze.     Wound:  Plantar L 1st MTH  Preparation for Dressing removal: Removed without difficulty  Cleansed/Non-selectively Debrided with:  Wound cleanser  Nuvia wound: Cleansed with Wound cleanser, Prepped with No Sting  Primary Dressing:  silver hydrofiber  Secondary (Outer) Dressing: covered with same drsg as hallux    Advanced Wound Care Discharge Planning  Number of Clinicians necessary to complete wound care: 1  Is patient requiring IV pain medications for dressing changes:  No   Length of time for dressing change  40  min. (This does not include chart review, pre-medication time, set up, clean up or time spent charting.)    Interdisciplinary consultation: Patient, Bedside RN     EVALUATION / RATIONALE FOR TREATMENT:     Date:  07/18/23  Wound Status:  Wound improving      Pt's L foot wound slightly better than previous picture. There is decreased purulence and periwound skin dry.  Edema decreased. Was unable to use cotton tip applicator to probe along Penrose drain to get depth. Sutures on dorsal toe intact. Continuing current drsg care.          Goals: Steady decrease in wound area and depth weekly.    WOUND TEAM PLAN OF CARE - Frequency of Follow-up:   Nursing to follow dressing orders written for wound care. Contact wound team if area fails to progress, deteriorates or with any questions/concerns if something comes up before next scheduled follow up (See below as to whether wound is following and frequency of wound follow up)  Dressing changes by wound team:                   Bi-weekly - L foot    NURSING PLAN OF CARE ORDERS:  Dressing changes: See Dressing Care orders    NUTRITION RECOMMENDATIONS   Wound Team Recommendations:  N/A     DIET ORDERS (From admission to next 24h)       Start     Ordered    07/14/23 0747  Diet Order Diet: Consistent CHO (Diabetic)  ALL MEALS        Question:  Diet:  Answer:  Consistent CHO (Diabetic)    07/14/23 0746                    PREVENTATIVE INTERVENTIONS:   Q shift Richard - performed per nursing policy  Q shift pressure point assessments - performed per nursing policy    Mobilization      Ambulate  to bathroom with FWW and offloading shoe    Anticipated discharge plans:  Outpatient Wound Center    LPS rounds recommended    Vac Discharge Needs:  Vac Discharge plan is purely a recommendation from wound team and not a requirement for discharge unless otherwise stated by physician.  Not Applicable Pt not on a wound vac

## 2023-07-18 NOTE — PROGRESS NOTES
Rounding  1900= introduced myself to patient and brought him some snacks he asked me for  2100= patient up to bathroom  2200= gave patient a CHG bath and more snacks  2300= vitals taken  0100= patient sleeping, patient woke up and ambulated to bathroom  0230= patient showering and changed all his sheets  0300= patient asked for snacks and drinks  0546= took vitals and gave patient snacks that he requested

## 2023-07-18 NOTE — PROGRESS NOTES
Infectious Disease Progress Note    Author: Teddy Burton M.D. Date & Time of service: 2023  2:37 PM    Chief Complaint:  Follow-up for Streptococcal bacteremia, osteomyelitis of the left foot    Interval History:  7/15 patient afebrile, creatinine 0.62.  Repeat blood cultures are negative to date.  Operative cultures are polymicrobial.  Patient is tolerating antibiotics without any issues.   ID reconsulted for discharge antibiotics.  Patient remained afebrile, white count 5.8, tolerating daptomycin and Unasyn, creatinine 0.73, albumin 3.3, magnesium 1.7, normal transaminases.  Ready to go home.  Understands high risk for failure and will monitor closely    Labs Reviewed, Medications Reviewed, and Wound Reviewed.    Review of Systems:  Review of Systems   Constitutional:  Negative for chills and fever.   Respiratory:  Negative for cough and shortness of breath.    Gastrointestinal:  Negative for abdominal pain, diarrhea, nausea and vomiting.   Neurological:  Negative for dizziness and headaches.       Hemodynamics:  Temp (24hrs), Av.5 °C (97.7 °F), Min:36.3 °C (97.4 °F), Max:36.8 °C (98.2 °F)  Temperature: 36.3 °C (97.4 °F)  Pulse  Av.8  Min: 56  Max: 104   Blood Pressure: 132/72       Physical Exam:  Physical Exam  Vitals and nursing note reviewed.   Eyes:      Pupils: Pupils are equal, round, and reactive to light.   Cardiovascular:      Rate and Rhythm: Normal rate.   Pulmonary:      Effort: Pulmonary effort is normal. No respiratory distress.      Breath sounds: No wheezing.   Abdominal:      Palpations: Abdomen is soft.   Musculoskeletal:      Comments: Left foot in dressing   Neurological:      General: No focal deficit present.      Mental Status: He is alert and oriented to person, place, and time.   Psychiatric:         Mood and Affect: Mood normal.         Behavior: Behavior normal.         Meds:    Current Facility-Administered Medications:     enoxaparin (LOVENOX) injection     insulin GLARGINE    senna-docusate **AND** polyethylene glycol/lytes **AND** magnesium hydroxide **AND** bisacodyl    LR    acetaminophen    Notify provider if pain remains uncontrolled **AND** Use the Numeric Rating Scale (NRS), Zapata-Baker Faces (WBF), or FLACC on regular floors and Critical-Care Pain Observation Tool (CPOT) on ICUs/Trauma to assess pain **AND** Pulse Ox **AND** Pharmacy Consult Request **AND** If patient difficult to arouse and/or has respiratory depression (respiratory rate of 10 or less), stop any opiates that are currently infusing and call a Rapid Response.    oxyCODONE immediate-release **OR** oxyCODONE immediate-release **OR** morphine injection    ampicillin-sulbactam (UNASYN) IV    labetalol    ondansetron    ondansetron    promethazine    promethazine    prochlorperazine    insulin regular **AND** POC blood glucose manual result **AND** NOTIFY MD and PharmD **AND** Administer 20 grams of glucose (approximately 8 ounces of fruit juice) every 15 minutes PRN FSBG less than 70 mg/dL **AND** dextrose bolus    Labs:  Recent Labs     07/16/23 0152 07/18/23 0331   WBC 5.5 5.8   RBC 4.01* 3.97*   HEMOGLOBIN 11.2* 11.1*   HEMATOCRIT 34.2* 35.5*   MCV 85.3 89.4   MCH 27.9 28.0   RDW 40.9 42.9   PLATELETCT 107* 139*   MPV 12.3 11.4   NEUTSPOLYS 69.00 61.40   LYMPHOCYTES 17.10* 19.10*   MONOCYTES 9.00 9.70   EOSINOPHILS 2.20 2.90   BASOPHILS 0.70 0.50       Recent Labs     07/16/23 0152 07/18/23  0331   SODIUM 139 138   POTASSIUM 3.6 3.6   CHLORIDE 103 101   CO2 25 27   GLUCOSE 139* 98   BUN 8 7*   CPKTOTAL 41  --        Recent Labs     07/16/23 0152 07/18/23  0331   ALBUMIN 3.1* 3.3   CREATININE 0.61 0.73         Imaging:  MR-FOOT-WITH & W/O LEFT    Result Date: 7/13/2023 7/13/2023 12:56 PM HISTORY/REASON FOR EXAM: Open wound left foot with foot infection. TECHNIQUE/EXAM DESCRIPTION: MRI of the LEFT foot with contrast. Using a Ember Signa 1.5 Gabriella MRI scanner, T1 axial, sagittal, and coronal,  fast spin-echo T2 fat-suppressed axial and coronal, fast inversion recovery sagittal, and T1 fat suppressed axial and sagittal post intravenous contrast images were obtained. A total of mL ProHance given. COMPARISON: None. FINDINGS: Examination is limited due to patient motion artifact and extensive failed fat saturation. Osseous structures/Cartilaginous surfaces: There is limited evaluation of the osseous structures due to motion artifact and failed fat saturation. There is some faint marrow edema and enhancement of the first metatarsal as well as the proximal phalanx. No evidence of bony destructive change. Miscellaneous: There is extensive soft tissue edema and enhancement of the mid and forefoot most prominently surrounding the great toe and involving the medial forefoot. There is also soft tissue ulceration involving the great toe in underlying the first  MTP joint. No focal abscess.     1.  Limited study due to extensive patient motion artifact and failed fat saturation. 2.  Some marrow edema and enhancement of the first metatarsal and the first proximal phalanx likely representing osteomyelitis. 3.  Extensive cellulitis involving the great toe with extension into the mid and forefoot most prominently medially. There are areas of soft tissue ulceration seen as well.    DX-FOOT-COMPLETE 3+ LEFT    Result Date: 7/12/2023 7/12/2023 3:58 PM HISTORY/REASON FOR EXAM:  Ulcer on the ball of the foot for several months recently progressing. TECHNIQUE/EXAM DESCRIPTION AND NUMBER OF VIEWS: 3 views of the LEFT foot. COMPARISON:  None FINDINGS: There is diffuse soft tissue swelling of the left ankle and foot. There are multiple small air lucencies in the soft tissues over the dorsum and medial aspect of the left foot adjacent to the 1st digit. Possible one small air lucency anterior to the ankle. No radiopaque foreign body. No periostitis to suggest osteomyelitis. There is no evidence of displaced fracture or dislocation.  The alignment is maintained. Minimal degenerative change in the tarsometatarsal junctions and 1st MTP joint.     1.  There is diffuse swelling of the left foot predominantly adjacent to the 1st metatarsal with soft tissue air lucencies suggestive of cellulitis or potentially abscess. 2.  No radiographic evidence of osteomyelitis.    DX-ANKLE 3+ VIEWS LEFT    Result Date: 7/12/2023 7/12/2023 3:58 PM HISTORY/REASON FOR EXAM:  Ulcer on the ball of the left foot for several months progressing over the past 2 days. TECHNIQUE/EXAM DESCRIPTION AND NUMBER OF VIEWS:  3 views of the LEFT ankle. COMPARISON: None FINDINGS: There is diffuse swelling of the left ankle and hindfoot. There is no soft tissue gas evident radiographically. No foreign body. There is no displaced fracture or dislocation. The alignment of the ankle is within normal limits. There is degenerative change in the dorsal tarsometatarsal junctions.     1.  There is diffuse swelling of the left ankle and hindfoot with no underlying osteomyelitis.    DX-CHEST-PORTABLE (1 VIEW)    Result Date: 7/12/2023 7/12/2023 3:54 PM HISTORY/REASON FOR EXAM:  Ulcer of the left foot. Possible sepsis. TECHNIQUE/EXAM DESCRIPTION AND NUMBER OF VIEWS: Single portable view of the chest. COMPARISON: 10/31/2014 FINDINGS: The mediastinal and cardiac silhouette is unremarkable. The pulmonary vascularity is within normal limits. The lung parenchyma is clear. There is no significant pleural effusion. There is no visible pneumothorax. There are no acute bony abnormalities.     1.  There is no acute cardiopulmonary process.      Micro:  Results       Procedure Component Value Units Date/Time    Blood Culture [[id number]]  (Abnormal)  (Susceptibility) Collected: 07/12/23 1557    Order Status: Completed Specimen: Blood from Peripheral Updated: 07/16/23 1149     Significant Indicator POS     Source BLD     Site PERIPHERAL     Culture Result Growth detected by Bactec instrument. 07/14/2023   "00:46      Streptococcus intermedius    Narrative:      CALL  [facility] tel. [phone],  CALLED  [facility] tel. [phone] 07/14/2023, 00:51, RB PERF. RESULTS CALLED TO:  [id number] RN  Per Hospital Policy: Only change Specimen Src: to \"Line\" if  specified by physician order.  Left AC    Susceptibility       Streptococcus intermedius (1)       Antibiotic Interpretation Microscan   Method Status    Penicillin Sensitive 0.023 mcg/mL E-TEST Final    Cefotaxime Sensitive 0.047 mcg/mL E-TEST Final                       CULTURE WOUND W/ GRAM STAIN [[id number]]  (Abnormal) Collected: 07/12/23 1938    Order Status: Completed Specimen: Wound Updated: 07/16/23 1047     Significant Indicator POS     Source WND     Site left foot #2     Culture Result -     Gram Stain Result Many WBCs.  Moderate Gram positive cocci.       Culture Result Streptococcus intermedius  Moderate growth        Staphylococcus aureus  Light growth        Corynebacterium striatum group  Rare growth        Enterococcus faecalis  Rare growth        Pasteurella species  Rare growth  Pasturella canis oralis      Narrative:      2 left foot swab #2  Surgery - swabs received    Fungal Culture [[id number]] Collected: 07/12/23 1938    Order Status: Completed Specimen: Wound Updated: 07/16/23 1047     Significant Indicator NEG     Source WND     Site left foot #2     Culture Result Culture in progress.     Fungal Smear Results No fungal elements seen.    Narrative:      2 left foot swab #2  Surgery - swabs received    Anaerobic Culture [[id number]]  (Abnormal) Collected: 07/12/23 1938    Order Status: Completed Specimen: Wound Updated: 07/16/23 1047     Significant Indicator POS     Source WND     Site left foot #2     Culture Result -      Prevotella disiens  Moderate growth      Narrative:      2 left foot swab #2  Surgery - swabs received    CULTURE WOUND W/ GRAM STAIN [[id number]]  (Abnormal)  (Susceptibility) Collected: 07/12/23 1845    Order Status: Completed Specimen: " Wound Updated: 07/16/23 1046     Significant Indicator POS     Source WND     Site left foot     Culture Result -     Gram Stain Result Many WBCs.  Many Gram positive cocci.       Culture Result Streptococcus intermedius  Moderate growth        Staphylococcus aureus  Light growth  Methicillin sensitive by screening method        Corynebacterium striatum group  Light growth        Enterococcus faecalis  Rare growth      Narrative:      1 left foot swab #1  Surgery - swabs received    Susceptibility       Staphylococcus aureus (2)       Antibiotic Interpretation Microscan   Method Status    Azithromycin Sensitive <=2 mcg/mL CONCETTA Final    Clindamycin Sensitive <=0.25 mcg/mL CONCETTA Final    Cefazolin Sensitive <=8 mcg/mL CONCETTA Final    Cefepime Sensitive <=4 mcg/mL CONCETTA Final    Ceftaroline Sensitive <=0.5 mcg/mL CONCETTA Final    Daptomycin Sensitive <=0.5 mcg/mL CONCETTA Final    Erythromycin Sensitive <=0.25 mcg/mL CONCETTA Final    Ampicillin/sulbactam Sensitive <=8/4 mcg/mL CONCETTA Final    Oxacillin Sensitive <=0.25 mcg/mL CONCETTA Final    Pip/Tazobactam Sensitive <=8 mcg/mL CONCETTA Final    Trimeth/Sulfa Sensitive <=0.5/9.5 mcg/mL CONCETTA Final    Tetracycline Sensitive <=4 mcg/mL CONCETTA Final    Vancomycin Sensitive 1 mcg/mL CONCETTA Final              Enterococcus faecalis (4)       Antibiotic Interpretation Microscan   Method Status    Daptomycin Sensitive 1 mcg/mL CONCETTA Final    Vancomycin Sensitive 1 mcg/mL CONCETTA Final    Ampicillin Sensitive <=2 mcg/mL CONCETTA Final    Gent Synergy Sensitive <=500 mcg/mL CONCETTA Final    Penicillin Sensitive 2 mcg/mL CONCETTA Final                       Fungal Culture [[id number]] Collected: 07/12/23 5527    Order Status: Completed Specimen: Wound Updated: 07/16/23 1046     Significant Indicator NEG     Source WND     Site left foot     Culture Result Culture in progress.     Fungal Smear Results No fungal elements seen.    Narrative:      1 left foot swab #1  Surgery - swabs received    Anaerobic Culture [[id number]]  (Abnormal)  "Collected: 07/12/23 1845    Order Status: Completed Specimen: Wound Updated: 07/16/23 1046     Significant Indicator POS     Source WND     Site left foot     Culture Result -      Bacteroides ovatus group  Rare growth        Prevotella disiens  Light growth      Narrative:      1 left foot swab #1  Surgery - swabs received    Blood Culture [[id number]]  (Abnormal) Collected: 07/12/23 1614    Order Status: Completed Specimen: Blood from Peripheral Updated: 07/16/23 1017     Significant Indicator POS     Source BLD     Site PERIPHERAL     Culture Result Growth detected by Bactec instrument. 07/13/2023  18:24      Streptococcus intermedius  See previous culture for sensitivity report.      Narrative:      CALL  [facility] tel. [phone],  CALLED  [facility] tel. [phone] 07/13/2023, 18:26, RB PERF. RESULTS CALLED TO:  MALINA Coronado [id number]  Per Hospital Policy: Only change Specimen Src: to \"Line\" if  specified by physician order.  Right Forearm/Arm    BLOOD CULTURE [[id number]] Collected: 07/14/23 0801    Order Status: Completed Specimen: Blood from Peripheral Updated: 07/15/23 0822     Significant Indicator NEG     Source BLD     Site PERIPHERAL     Culture Result No Growth  Note: Blood cultures are incubated for 5 days and  are monitored continuously.Positive blood cultures  are called to the RN and reported as soon as  they are identified.  Blood culture testing and Gram stain, if indicated, are  performed at [facility], [address] [place] [address].  Positive blood cultures are  sent to [facility], [address] [place] [address], for organism identification and  susceptibility testing.      Narrative:      Per Hospital Policy: Only change Specimen Src: to \"Line\" if  specified by physician order.  Right Hand    BLOOD CULTURE [[id number]] Collected: 07/14/23 0801    Order Status: Completed Specimen: Blood from Peripheral Updated: 07/15/23 0822     Significant Indicator NEG     " "Source BLD     Site PERIPHERAL     Culture Result No Growth  Note: Blood cultures are incubated for 5 days and  are monitored continuously.Positive blood cultures  are called to the RN and reported as soon as  they are identified.  Blood culture testing and Gram stain, if indicated, are  performed at [facility], [address] [place] [address].  Positive blood cultures are  sent to [facility], [address] [place] [address], for organism identification and  susceptibility testing.      Narrative:      Per Hospital Policy: Only change Specimen Src: to \"Line\" if  specified by physician order.  Right AC    Urine Culture (New) [[id number]] Collected: 07/12/23 1550    Order Status: Completed Specimen: Urine Updated: 07/15/23 0702     Significant Indicator NEG     Source UR     Site -     Culture Result Usual skin ramonita <10,000 cfu/mL    Narrative:      Indication for culture:->Evaluation for sepsis without a  clear source of infection  Indication for culture:->Evaluation for sepsis without a    Culture Wound W/ Gram Stain [[id number]]  (Abnormal)  (Susceptibility) Collected: 07/12/23 1841    Order Status: Completed Specimen: Wound from Left Foot Updated: 07/14/23 1210     Significant Indicator POS     Source WND     Site LEFT FOOT     Culture Result Heavy growth usual skin ramonita.     Gram Stain Result Many WBCs.  Many mixed bacteria, no predominant organism seen.       Culture Result Staphylococcus aureus  Moderate growth  Methicillin sensitive by screening method      Susceptibility       Staphylococcus aureus (1)       Antibiotic Interpretation Microscan   Method Status    Azithromycin Sensitive <=2 mcg/mL CONCETTA Final    Clindamycin Sensitive 0.5 mcg/mL CONCETTA Final    Cefazolin Sensitive <=8 mcg/mL CONCETTA Final    Cefepime Sensitive <=4 mcg/mL CONCETTA Final    Ceftaroline Sensitive <=0.5 mcg/mL CONCETTA Final    Daptomycin Sensitive 1 mcg/mL CONCETTA Final    Erythromycin Sensitive <=0.25 " mcg/mL CONCETTA Final    Ampicillin/sulbactam Sensitive <=8/4 mcg/mL CONCETTA Final    Vancomycin Sensitive 1 mcg/mL CONCETTA Final    Oxacillin Sensitive <=0.25 mcg/mL CONCETTA Final    Pip/Tazobactam Sensitive <=8 mcg/mL CONCETTA Final    Trimeth/Sulfa Sensitive <=0.5/9.5 mcg/mL CONCETTA Final    Tetracycline Sensitive <=4 mcg/mL CONCETTA Final                       GRAM STAIN [[id number]]  (Abnormal) Collected: 07/12/23 1938    Order Status: Completed Specimen: Wound Updated: 07/13/23 2037     Significant Indicator .     Source WND     Site left foot #2     Gram Stain Result Many WBCs.  Moderate Gram positive cocci.      Narrative:      2 left foot swab #2  Surgery - swabs received    Fungal Smear [[id number]] Collected: 07/12/23 1938    Order Status: Completed Specimen: Wound Updated: 07/13/23 2037     Significant Indicator NEG     Source WND     Site left foot #2     Fungal Smear Results No fungal elements seen.    Narrative:      2 left foot swab #2  Surgery - swabs received    GRAM STAIN [[id number]] Collected: 07/12/23 1845    Order Status: Completed Specimen: Wound Updated: 07/13/23 2036     Significant Indicator .     Source WND     Site left foot     Gram Stain Result Many WBCs.  Many Gram positive cocci.      Narrative:      1 left foot swab #1  Surgery - swabs received    Fungal Smear [[id number]] Collected: 07/12/23 1845    Order Status: Completed Specimen: Wound Updated: 07/13/23 2036     Significant Indicator NEG     Source WND     Site left foot     Fungal Smear Results No fungal elements seen.    Narrative:      1 left foot swab #1  Surgery - swabs received    MRSA By PCR (Amp) [[id number]] Collected: 07/13/23 0718    Order Status: Completed Specimen: Respirate from Nares Updated: 07/13/23 1549     MRSA by PCR Negative    Narrative:      Collected By: [id number] ROBERT BHANU WHITMAN    GRAM STAIN [[id number]] Collected: 07/12/23 1841    Order Status: Completed Specimen: Wound Updated: 07/13/23 0700     Significant Indicator .     Source WND      Site LEFT FOOT     Gram Stain Result Many WBCs.  Many mixed bacteria, no predominant organism seen.      AFB Culture [[id number]] Collected: 07/12/23 1938    Order Status: Canceled Specimen: Other     AFB Culture [[id number]] Collected: 07/12/23 1845    Order Status: Canceled Specimen: Other     Urinalysis [[id number]]  (Abnormal) Collected: 07/12/23 1550    Order Status: Completed Specimen: Blood Updated: 07/12/23 1620     Color Yellow     Character Hazy     Specific Gravity 1.025     Ph 5.5     Glucose 100 mg/dL      Ketones Negative mg/dL      Protein 100 mg/dL      Bilirubin Moderate     Nitrite Positive     Leukocyte Esterase Negative     Occult Blood Trace     Micro Urine Req Microscopic    Narrative:      Indication for culture:->Evaluation for sepsis without a  clear source of infection            Assessment/Hospital course:  33 y.o. man admitted 7/12/2023. Pt has a past medical history of DMT2, alcoholic cirrhosis and sober x2 years per notes, admitted with left foot pain, erythema and drainage.  Febrile to 103.4 on arrival.  MRI left foot w/ osteomyelitis, extensive cellulitis of the great toe with extension into the midfoot.  Orthopedic surgery were consulted and the patient went to the OR.  He is status post I&D with drainage of abscess from plantar and dorsal aspects of the left foot on 7/12/2023.  Per the procedure note, there was significant purulence noted and the wound bed probed all the way down to the lateral aspect of the dorsal forefoot.    Pertinent diagnoses:  Bacteremia,  secondary to foot infection  -Blood cultures on 7/12 + possible Streptococcus intermedius (confirmed Gram stain with microbiology department)   Cellulitis of left great toe and midfoot, abscess  Osteomyelitis per MRI of first metatarsal and first proximal phalanx  -OR on 7/12 for I&D with drainage of abscess from plantar and dorsal aspects of the left foot.  Per op note very foul odor and significant purulence, cultures were  obtained.  -Wound cultures on 7/12 +MSSA  - OR cultures from 7/12 +GPCs   Diabetes mellitus  Alcoholic cirrhosis  History of alcohol abuse  Thrombocytopenia    Plan:  -Continue IV Unasyn 3 g every 6 hours while inpatient  -Follow wound cultures on 7/12+ MSSA  -Status post I&D with drainage of abscess on 7/12/2023.  Per the procedure note, there was significant purulence noted and the wound bed probed all the way down to the lateral aspect of the dorsal forefoot and tracked medially communicating with the plantar ulcer.  -Follow OR culture on 7/12+ Streptococcus intermedius, MSSA, Enterococcus faecalis and Pasteurella canis oralis, Corynebacterium stratum group.    -Will primarily target the MSSA and Streptococcus which was also in the blood.  Suspect the Corynebacterium and Enterococcus from the polymicrobial foot cultures to be colonizers. There is evidence for this approach particularly with Enterococcus in a polymicrobial abscess being treated with ertapenem  -Blood culture on 7/12+. Repeat blood cultures on 7/14-negative  -Orthopedics has recommended further I&D's and likely first ray amputation but patient has refused  -Anticipate a 6-week course of antibiotics from date of first negative blood culture on 7/14 with stop date of 8/25/2023.  High risk for failure irrespective of antibiotic choice and duration given burden of infection and patient's wishes to avoid further surgical intervention at this time    Disposition: Patient is being discharged with infusion center antibiotics.  On discharge, recommend IV ertapenem 1 g every 24 hours through 8/25/2023  PICC line: Already in place    Discussed with internal medicine/Dr. Rapp.  This illness poses a threat to limb function    ID will sign off.  ID clinic follow-up in 2 to 3 weeks.  Okay to schedule with SHLOMO Webber

## 2023-07-18 NOTE — PROGRESS NOTES
[facility] Medicine Daily Progress Note    Date of Service  7/18/2023    Chief Complaint  Esequiel Molina is a 33 y.o. male admitted 7/12/2023 with foot wound with drainage and foul odor    Hospital Course  This is a gentleman with a history of alcohol abuse (recovered), DM II and GERD who presented to hospital with a diabetic foot wound on 7/12/23. MRI foot was performed and demonstrated osteo of the first metatarsal and proximal phalanx - the pt underwent I&D with drainage of an abscess from the plantar and dorsal aspects of the L foot with Dr Ponce on 7/12 as the patient did not wish for amputation. Blood cultures from 7/12/23 were positive for strep intermedius, and foot cultures grew VIDAL, strep intermedius, Corynebacterium, pasteurella, bacteroides, prevotella and enterococcus. Repeat blood cultures taken on 7/14 were negative. ID was consulted and are recommending 6 weeks of Dapto and Ertapenem from 7/14 with a stop date of 8/25. The patient is uninsured and has agreed to pay out of pocket for wound care services, and we are looking to supplement access for [facility] for him.     Interval Problem Update  7/13: MRI performed and confirms osteomyelitis and cellulitis.  He will likely need at least a partial amputation and will need ID consultation for IV antibiotics.  Pending final Ortho recommendations.  Continue IV Vanco and Unasyn.  Sugars are not at goal, Lantus added    7/14: Patient declining surgery, ID consulted for antibiotic treatment.  Cultures with MSSA.  Positive blood culture from 7/12 (strep), repeat drawn.  Sugars improved with Lantus.  Phos low, replaced    7/15: AM sugar still elevated Lantus increased.  Operative cultures are polymicrobial, daptomycin added.  End date 8/25 7/16: Sugars are at goal, tolerating Dapto/Unasyn.  Outpatient wound care ordered.  PICC line to be placed tomorrow    7/17: PICC line placed, pending OP  infusion estimate in order to secure funding    7/18 - Pt feeling well  today, having some mild purulence from the wound.  Discussed with ID, they will see pt today. Given strep  bacteremia, pt will need echo prior to discharge, will order.  Likely dc tomorrow with [facility].    I have discussed this patient's plan of care and discharge plan at IDT rounds today with Case Management, Nursing, Nursing leadership, and other members of the IDT team.    Consultants/Specialty  orthopedics    Code Status  Full Code    Disposition    Anticipate discharge to: home with organized home healthcare and close outpatient follow-up    I have placed the appropriate orders for post-discharge needs.    Review of Systems  Review of Systems   Constitutional:  Negative for chills, fever and malaise/fatigue.   HENT:  Negative for sore throat.    Respiratory:  Negative for cough and shortness of breath.    Cardiovascular:  Negative for chest pain, palpitations and claudication.   Gastrointestinal:  Negative for abdominal pain, blood in stool, diarrhea, heartburn, nausea and vomiting.   Genitourinary:  Negative for dysuria and frequency.   Musculoskeletal:  Negative for back pain and myalgias.   Skin:         Still some drainage from wound, no pain   Neurological:  Negative for dizziness, focal weakness, weakness and headaches.   Psychiatric/Behavioral:  Negative for depression and hallucinations. The patient is nervous/anxious.    All other systems reviewed and are negative.       Physical Exam  Temp:  [36.3 °C (97.4 °F)-36.8 °C (98.2 °F)] 36.3 °C (97.4 °F)  Pulse:  [62-76] 71  Resp:  [15-18] 17  BP: (130-153)/(72-91) 132/72  SpO2:  [97 %-99 %] 99 %    Physical Exam  Constitutional:       Appearance: Normal appearance.   HENT:      Head: Normocephalic and atraumatic.      Nose: Nose normal.      Mouth/Throat:      Mouth: Mucous membranes are moist.   Eyes:      Extraocular Movements: Extraocular movements intact.      Pupils: Pupils are equal, round, and reactive to light.   Cardiovascular:      Rate and Rhythm:  Normal rate and regular rhythm.      Heart sounds: No murmur heard.  Pulmonary:      Effort: Pulmonary effort is normal. No respiratory distress.      Breath sounds: Normal breath sounds. No stridor.   Abdominal:      General: Abdomen is flat. Bowel sounds are normal. There is no distension.      Palpations: Abdomen is soft.      Tenderness: There is no abdominal tenderness.   Musculoskeletal:         General: No swelling, tenderness or deformity.      Cervical back: Normal range of motion and neck supple.      Comments: Dressing with purulence   Skin:     General: Skin is warm and dry.      Coloration: Skin is not pale.   Neurological:      General: No focal deficit present.      Mental Status: He is alert and oriented to person, place, and time. Mental status is at baseline.   Psychiatric:         Speech: Speech is rapid and pressured.         Behavior: Behavior normal.         Fluids    Intake/Output Summary (Last 24 hours) at 7/18/2023 1343  Last data filed at 7/18/2023 0900  Gross per 24 hour   Intake 1600 ml   Output --   Net 1600 ml         Laboratory  Recent Labs     07/16/23  0152 07/18/23  0331   WBC 5.5 5.8   RBC 4.01* 3.97*   HEMOGLOBIN 11.2* 11.1*   HEMATOCRIT 34.2* 35.5*   MCV 85.3 89.4   MCH 27.9 28.0   MCHC 32.7 31.3*   RDW 40.9 42.9   PLATELETCT 107* 139*   MPV 12.3 11.4       Recent Labs     07/16/23  0152 07/18/23  0331   SODIUM 139 138   POTASSIUM 3.6 3.6   CHLORIDE 103 101   CO2 25 27   GLUCOSE 139* 98   BUN 8 7*   CREATININE 0.61 0.73   CALCIUM 8.4 8.9                       Imaging  IR-PICC LINE PLACEMENT W/ GUIDANCE > AGE 5   Final Result                  Ultrasound-guided PICC placement performed by qualified nursing staff as    above.          MR-FOOT-WITH & W/O LEFT   Final Result      1.  Limited study due to extensive patient motion artifact and failed fat saturation.      2.  Some marrow edema and enhancement of the first metatarsal and the first proximal phalanx likely representing  osteomyelitis.      3.  Extensive cellulitis involving the great toe with extension into the mid and forefoot most prominently medially. There are areas of soft tissue ulceration seen as well.      DX-ANKLE 3+ VIEWS LEFT   Final Result      1.  There is diffuse swelling of the left ankle and hindfoot with no underlying osteomyelitis.      DX-FOOT-COMPLETE 3+ LEFT   Final Result      1.  There is diffuse swelling of the left foot predominantly adjacent to the 1st metatarsal with soft tissue air lucencies suggestive of cellulitis or potentially abscess.   2.  No radiographic evidence of osteomyelitis.      DX-CHEST-PORTABLE (1 VIEW)   Final Result      1.  There is no acute cardiopulmonary process.      EC-ECHOCARDIOGRAM COMPLETE W/ CONT    (Results Pending)          Assessment/Plan  * Abscess of left foot- (present on admission)  Assessment & Plan  Chronic DM ulcer which was healing with wound care, then he developed pain, swelling, fever  Imaging showed abscess formation with gas suspicious for necrotizing fasciitis s/p I&D by Dr. Ponce on 7/12  Operative cultures are polymicrobial  MRI shows osteomyelitis-patient declined any further orthopedic surgery  D/w ID- will need 6 wks IV abx  Continue Unasyn, add Dapto (to cover Corynebacterium) through 8/25  Strict diabetic control  PICC in place      Bacteremia  Assessment & Plan  Strep sp + in both cultures from 7/12  Repeat blood cultures drawn on 7/14 -negative  Check echo    Hypophosphatemia  Assessment & Plan  Improved    Acute osteomyelitis of left foot (HCC)  Assessment & Plan  Due to infected diabetic ulcer with underlying abscess status post washout on 7/12  Cultures are growing MSSA/polymicrobial  Patient declining orthopedic intervention at this time and would like to opt for nonoperative treatment despite the increased risk of recurrent infection  I discussed with ID -anticipate 6 weeks of IV antibiotic therapy throug 8/25  Continue Unasyn + Dapto here, plan  for Ertapenem at discharge  Has PICC will get [facility] infusions- pending funding coverage/estimate  Heel weightbearing  Ordered OP wound care, pt will pay out of pocket    Normochromic normocytic anemia  Assessment & Plan  Chronic disease and pancytopenia from cirrhosis contributing  No e/o bleeding  Monitor    Metabolic acidosis  Assessment & Plan  Resolved    Diabetes mellitus type II, controlled (HCC)  Assessment & Plan  Sugars are at goal last 24 hours  A1C of 5.8, neuropathy from DM contributing   Continue lantus 7U in hospital, Metformin at discharge   continue sliding scale   -diabetic diet    Hyponatremia- (present on admission)  Assessment & Plan  Resolved    Portal venous hypertension (HCC)- (present on admission)  Assessment & Plan  History of portal hypertension  Continue blood pressure management using beta-blockers prn    Alcoholic hepatitis with ascites- (present on admission)  Assessment & Plan  History of alcoholic hepatitis and he has been in remission for 2-1/2 years  He demonstrates pancytopenia, chronic and stable  Continue to monitor         VTE prophylaxis: SCDs/TEDs, lovenox    I have performed a physical exam and reviewed and updated ROS and Plan today (7/18/2023). In review of yesterday's note (7/17/2023), there are no changes except as documented above.

## 2023-07-18 NOTE — DISCHARGE INSTRUCTIONS
Diet: Diet Order Diet: Consistent CHO (Diabetic)  Activity: As tolerated  Follow Up: Please see the wound care clinic as scheduled on Thursday, and present to the IV infusion center today for antibiotics. Please see your PCP in one week for blood pressure and blood sugar check. Please see Dr Ponce in 1-2 weeks.  Please use your knee scooter and only put weight on the heel of your L foot when absolutely necessary  Disposition:Home with outpt wound care and IV infusion  Diagnosis: Abscess of left foot    Follow up with your Primary Care Provider Juvenal Lewis P.A.-C. as scheduled or sooner if your symptoms persist or worsen.  Return to Emergency Room for severe chest pain, shortness of breath, signs of a stroke, or any other emergencies.

## 2023-07-19 ENCOUNTER — OUTPATIENT INFUSION SERVICES (OUTPATIENT)
Dept: ONCOLOGY | Facility: MEDICAL CENTER | Age: 33
End: 2023-07-19
Attending: INTERNAL MEDICINE

## 2023-07-19 VITALS
HEIGHT: 77 IN | TEMPERATURE: 96.9 F | BODY MASS INDEX: 24.57 KG/M2 | SYSTOLIC BLOOD PRESSURE: 141 MMHG | HEART RATE: 61 BPM | WEIGHT: 208.11 LBS | DIASTOLIC BLOOD PRESSURE: 89 MMHG | OXYGEN SATURATION: 100 % | RESPIRATION RATE: 18 BRPM

## 2023-07-19 VITALS
DIASTOLIC BLOOD PRESSURE: 97 MMHG | BODY MASS INDEX: 24.67 KG/M2 | WEIGHT: 208.11 LBS | RESPIRATION RATE: 20 BRPM | HEART RATE: 111 BPM | TEMPERATURE: 97.5 F | OXYGEN SATURATION: 94 % | SYSTOLIC BLOOD PRESSURE: 135 MMHG

## 2023-07-19 DIAGNOSIS — R78.81 BACTEREMIA: ICD-10-CM

## 2023-07-19 DIAGNOSIS — M86.172 ACUTE OSTEOMYELITIS OF LEFT FOOT (HCC): ICD-10-CM

## 2023-07-19 LAB
ANION GAP SERPL CALC-SCNC: 10 MMOL/L (ref 7–16)
BACTERIA BLD CULT: NORMAL
BACTERIA BLD CULT: NORMAL
BACTERIA WND AEROBE CULT: ABNORMAL
BASOPHILS # BLD AUTO: 0.9 % (ref 0–1.8)
BASOPHILS # BLD: 0.05 K/UL (ref 0–0.12)
BUN SERPL-MCNC: 8 MG/DL (ref 8–22)
CALCIUM SERPL-MCNC: 9.2 MG/DL (ref 8.4–10.2)
CHLORIDE SERPL-SCNC: 104 MMOL/L (ref 96–112)
CO2 SERPL-SCNC: 27 MMOL/L (ref 20–33)
CREAT SERPL-MCNC: 0.68 MG/DL (ref 0.5–1.4)
EOSINOPHIL # BLD AUTO: 0.15 K/UL (ref 0–0.51)
EOSINOPHIL NFR BLD: 2.7 % (ref 0–6.9)
ERYTHROCYTE [DISTWIDTH] IN BLOOD BY AUTOMATED COUNT: 42.4 FL (ref 35.9–50)
GFR SERPLBLD CREATININE-BSD FMLA CKD-EPI: 126 ML/MIN/1.73 M 2
GLUCOSE BLD STRIP.AUTO-MCNC: 93 MG/DL (ref 65–99)
GLUCOSE SERPL-MCNC: 98 MG/DL (ref 65–99)
GRAM STN SPEC: ABNORMAL
HCT VFR BLD AUTO: 36 % (ref 42–52)
HGB BLD-MCNC: 11.6 G/DL (ref 14–18)
IMM GRANULOCYTES # BLD AUTO: 0.41 K/UL (ref 0–0.11)
IMM GRANULOCYTES NFR BLD AUTO: 7.4 % (ref 0–0.9)
LYMPHOCYTES # BLD AUTO: 1.16 K/UL (ref 1–4.8)
LYMPHOCYTES NFR BLD: 20.8 % (ref 22–41)
MCH RBC QN AUTO: 28.5 PG (ref 27–33)
MCHC RBC AUTO-ENTMCNC: 32.2 G/DL (ref 32.3–36.5)
MCV RBC AUTO: 88.5 FL (ref 81.4–97.8)
MONOCYTES # BLD AUTO: 0.55 K/UL (ref 0–0.85)
MONOCYTES NFR BLD AUTO: 9.9 % (ref 0–13.4)
NEUTROPHILS # BLD AUTO: 3.25 K/UL (ref 1.82–7.42)
NEUTROPHILS NFR BLD: 58.3 % (ref 44–72)
NRBC # BLD AUTO: 0 K/UL
NRBC BLD-RTO: 0 /100 WBC (ref 0–0.2)
PLATELET # BLD AUTO: 145 K/UL (ref 164–446)
PMV BLD AUTO: 11.3 FL (ref 9–12.9)
POTASSIUM SERPL-SCNC: 4.6 MMOL/L (ref 3.6–5.5)
RBC # BLD AUTO: 4.07 M/UL (ref 4.7–6.1)
SIGNIFICANT IND 70042: ABNORMAL
SIGNIFICANT IND 70042: NORMAL
SIGNIFICANT IND 70042: NORMAL
SITE SITE: ABNORMAL
SITE SITE: NORMAL
SITE SITE: NORMAL
SODIUM SERPL-SCNC: 141 MMOL/L (ref 135–145)
SOURCE SOURCE: ABNORMAL
SOURCE SOURCE: NORMAL
SOURCE SOURCE: NORMAL
WBC # BLD AUTO: 5.6 K/UL (ref 4.8–10.8)

## 2023-07-19 PROCEDURE — 96365 THER/PROPH/DIAG IV INF INIT: CPT

## 2023-07-19 PROCEDURE — 700105 HCHG RX REV CODE 258: Performed by: HOSPITALIST

## 2023-07-19 PROCEDURE — 80048 BASIC METABOLIC PNL TOTAL CA: CPT

## 2023-07-19 PROCEDURE — 99239 HOSP IP/OBS DSCHRG MGMT >30: CPT | Performed by: FAMILY MEDICINE

## 2023-07-19 PROCEDURE — 700111 HCHG RX REV CODE 636 W/ 250 OVERRIDE (IP): Mod: JZ | Performed by: HOSPITALIST

## 2023-07-19 PROCEDURE — 700111 HCHG RX REV CODE 636 W/ 250 OVERRIDE (IP): Mod: JZ | Performed by: INTERNAL MEDICINE

## 2023-07-19 PROCEDURE — 85025 COMPLETE CBC W/AUTO DIFF WBC: CPT

## 2023-07-19 PROCEDURE — 700105 HCHG RX REV CODE 258: Performed by: INTERNAL MEDICINE

## 2023-07-19 PROCEDURE — 94760 N-INVAS EAR/PLS OXIMETRY 1: CPT

## 2023-07-19 PROCEDURE — 82962 GLUCOSE BLOOD TEST: CPT

## 2023-07-19 RX ORDER — 0.9 % SODIUM CHLORIDE 0.9 %
10 VIAL (ML) INJECTION PRN
Status: CANCELLED | OUTPATIENT
Start: 2023-07-20

## 2023-07-19 RX ORDER — 0.9 % SODIUM CHLORIDE 0.9 %
3 VIAL (ML) INJECTION PRN
Status: CANCELLED | OUTPATIENT
Start: 2023-07-20

## 2023-07-19 RX ORDER — 0.9 % SODIUM CHLORIDE 0.9 %
VIAL (ML) INJECTION PRN
Status: CANCELLED | OUTPATIENT
Start: 2023-07-20

## 2023-07-19 RX ADMIN — AMPICILLIN SODIUM AND SULBACTAM SODIUM 3 G: 2; 1 INJECTION, POWDER, FOR SOLUTION INTRAMUSCULAR; INTRAVENOUS at 05:05

## 2023-07-19 RX ADMIN — ERTAPENEM 1000 MG: 1 INJECTION, POWDER, LYOPHILIZED, FOR SOLUTION INTRAMUSCULAR; INTRAVENOUS at 18:06

## 2023-07-19 ASSESSMENT — FIBROSIS 4 INDEX: FIB4 SCORE: 1.09

## 2023-07-19 NOTE — CARE PLAN
"The patient is Stable - Low risk of patient condition declining or worsening    Shift Goals  Clinical Goals: Wound care to be done this shift, pain control to less than 4  Patient Goals: \"go home\"  Family Goals: n/a    Progress made toward(s) clinical / shift goals:  Wound care done by RN and wound care team, patient tolerated both dressing changed. No PRN medication interventions requested    Patient is not progressing towards the following goals:      "

## 2023-07-19 NOTE — CARE PLAN
"The patient is Stable - Low risk of patient condition declining or worsening    Shift Goals  Clinical Goals: Rest well, safety, monitor for pain  Patient Goals: \"get my echo results and go home tomorrow\"  Family Goals: n/a    Progress made toward(s) clinical / shift goals:  Slept in long intervals, no pain noted, anticipating discharge today      Problem: Pain - Standard  Goal: Alleviation of pain or a reduction in pain to the patient’s comfort goal  Outcome: Progressing     Problem: Knowledge Deficit - Standard  Goal: Patient and family/care givers will demonstrate understanding of plan of care, disease process/condition, diagnostic tests and medications  Outcome: Progressing     Problem: Self Care  Goal: Patient will have the ability to perform ADLs independently or with assistance (bathe, groom, dress, toilet and feed)  Outcome: Progressing     Problem: Infection - Standard  Goal: Patient will remain free from infection  Outcome: Progressing     Problem: Wound/ / Incision Healing  Goal: Patient's wound/surgical incision will decrease in size and heals properly  Outcome: Progressing     Problem: Fall Risk  Goal: Patient will remain free from falls  Outcome: Progressing     "

## 2023-07-19 NOTE — PROGRESS NOTES
1900: Received bedside report from day shift MALINA Lujan. Pt resting in bed. A&Ox4. No complaints at this time. Discussed plan of care for the night  2010: Blood sugar checked, pt requesting snacks, denies pain  2140: Pt asleep, woke up when walked into room, given blanket, no other needs  2330: Pt asleep, woken up to connect IV antibiotic. Denies pain. Able to fall back asleep easily  0000: Pt awake watching TV, disconnected from IV  0200: Pt asleep, seen repositioning in bed, respirations even and unlabored  0230: Up to bathroom with walker  0320: Rounding on pt, asleep, RR 16  0430: Sleeping, respirations even and unlabored  0505: IV antibiotics given, pt resting comfortably  0606: Scheduled insulin given, denies pain

## 2023-07-19 NOTE — CARE PLAN
Problem: Diabetic Ulcer  Goal: Early identification of diabetic foot wound and initiation of appropriate interventions  Outcome: Progressing     Problem: Infection - Diabetes  Goal: Patient will remain free from signs and symptoms of infection  Outcome: Progressing     Problem: Skin Integrity - Diabetes  Goal: Patient's skin on legs and feet will remain intact while hospitalized  Outcome: Progressing     Problem: Discharge Planning - Diabetes  Goal: Patient's continuum of care needs will be met  Outcome: Progressing     Problem: Knowledge Deficit - Diabetes  Goal: Patient will demonstrate knowledge of insulin injection, symptoms, and treatment of hypoglycemia and diet prior to discharge  Outcome: Progressing     Problem: Diabetes Management  Goal: Patient will achieve and maintain glucose in satisfactory range  Outcome: Progressing

## 2023-07-19 NOTE — PROGRESS NOTES
Rounding  1900= introduced myself to patient  2100= patient watching TV   2200= gave patient CHG bath and snacks  2300= vitals taken  0100= patient sleeping  0230= patient ambulated to bathroom  0300= patient sleeping comfortably  0500= vitals taken

## 2023-07-19 NOTE — CARE PLAN
Problem: Discharge Planning - Diabetes  Goal: Patient's continuum of care needs will be met  Outcome: Progressing     Problem: Infection - Diabetes  Goal: Promotion wound healing, line and drain management  Outcome: Progressing     Problem: Nutrition Deficit - Diabetes  Goal: Patient will demonstrate adequate hydration and vital signs  Outcome: Progressing     The patient is Stable - Low risk of patient condition declining or worsening    Shift Goals  Clinical Goals: Pt will remain free from falls or injury throughout shift  Patient Goals: Discharge home today  Family Goals: n/a    Progress made toward(s) clinical / shift goals:  Pt cleared for discharge by hospitalist this AM. Pt understands follow-up plans including wound care clinic appointment tomorrow 7/20 and infusion center appointment today 7/19. Pt able to ambulate independently using FWW for offloading weight.    Patient is not progressing towards the following goals: n/a

## 2023-07-19 NOTE — DISCHARGE SUMMARY
Discharge Summary    CHIEF COMPLAINT ON ADMISSION  Chief Complaint   Patient presents with    Wound Infection     Ulcer on the Left ball of foot for many months. Became worse 2 days ago.    Abdominal Pain     He feels the abd pain stems from the inability to eat.       Reason for Admission  Foot Pain     Admission Date  7/12/2023    CODE STATUS  Full Code    HPI & HOSPITAL COURSE    This is a gentleman with a history of alcohol abuse (recovered), DM II and GERD who presented to hospital with a diabetic foot wound on 7/12/23. MRI foot was performed and demonstrated osteo of the first metatarsal and proximal phalanx - the pt underwent I&D with drainage of an abscess from the plantar and dorsal aspects of the L foot with Dr Ponce on 7/12 as the patient did not wish for amputation. Blood cultures from 7/12/23 were positive for strep intermedius, and foot cultures grew VIDAL, strep intermedius, Corynebacterium, pasteurella, bacteroides, prevotella and enterococcus. Repeat blood cultures taken on 7/14 were negative. ID was consulted and are recommending 6 weeks of Ertapenem from 7/14 with a stop date of 8/25. The patient is uninsured and has agreed to pay out of pocket for wound care services, and we are providing him with [facility]. He has a knee roller scooter for use at home and is aware not to bear weight on his wound. It has been explained to him that without definitive management with amputation, he is high risk for antibiotic failure, and he is aware. He is stable for discharge home.    Therefore, he is discharged in good and stable condition to home with organized home healthcare and close outpatient follow-up.    The patient met 2-midnight criteria for an inpatient stay at the time of discharge.    Discharge Date  7/19/2023    FOLLOW UP ITEMS POST DISCHARGE  Dr Ponce in 1-2 weeks, PCP in one week, wound care clinic tomorrow, [facility] this evening.     DISCHARGE DIAGNOSES  Principal Problem:    Abscess of left foot (POA:  Yes)  Active Problems:    Alcoholic hepatitis with ascites (POA: Yes)    Portal venous hypertension (HCC) (POA: Yes)    Hyponatremia (POA: Yes)    Diabetes mellitus type II, controlled (HCC) (POA: Unknown)    Metabolic acidosis (POA: Unknown)    Normochromic normocytic anemia (POA: Unknown)    Acute osteomyelitis of left foot (HCC) (POA: Unknown)    Hypophosphatemia (POA: Unknown)    Bacteremia (POA: Unknown)  Resolved Problems:    * No resolved hospital problems. *      FOLLOW UP  Future Appointments   Date Time Provider Department Center   7/19/2023  6:00 PM RN 7 [facility]   7/20/2023  7:30 AM Kortney Dawson R.N. [address]   7/20[facility]2023  [facility]   7/21[facility]23  5[facility]   7/22/2023  5:00 [facility]   7/23/2023  5:30 [facility]   7/24/2023  5:45 [facility]   7/25/2023  5:30 [facility]   7/26/2023  5:45 [facility]   7/27/2023  5:45 [facility]   7/28/2023  5[facility]30 [facility]   7/29/2023  5:30 [facility]   7/30/2023  5:45 [facility]   7/31/2023  6:00 [facility]   8/1/2023  6:00 [facility]   8/2/2023  6:00 [facility]   8/3/2023  6:00 [facility]   8/4/2023  5:45 PM [facility]   8/5/2023  5:30 PM [facility]   8/6/2023  6:00 PM [facility]   8/7/2023  5:45 PM [facility]   8/8/2023  5:45 PM [facility]   8/9/2023  6:00 PM [facility]   8/10/2023  6:00 PM [facility]   8/11/2023  6:00 PM INFUSION QUICK INJECT [facility]    8/12/2023  6:00 PM [facility] ONP [address]   8/13/2023  6:00 PM [facility] ON[facility] [address]   8/14/2023  6:00 PM INFUSION QUICK INJECT ONP [address]   8/15/2023  6:00 PM INFUSION QUICK INJECT ONP [address]   8/16/2023  6:00 PM [facility] [address]   8/17/2023  5:30 PM INFUSION QUICK INJECT ONP [facility]   8/18/2023  6:00 PM INFUSION QUICK INJECT ONP [address]   8/19/2023  6:00 PM [facility]   8/20/2023  6:00 PM [facility]   8/21/2023  5:45 PM [facility]   8/22/2023  6:00 PM INFUSION QUICK INJECT ONP [facility]   8/23/2023  6:00 PM [facility]   8/24/2023  6:00 PM INFUSION QUICK INJECT ONP [facility]   8/25/2023  6:00 PM INFUSION QUICK INJECT ONP [facility]  [address] [place] [id number]  [phone]  Call on 7/21/2023  On Friday July 21st 2023 please call [facility] to establish with a Primary Care Provider. This office offers sliding fee scales based on income. Thank you.    Michael Ponce M.D.  [address] [facility] [address]  [phone]    Call  need to be seen 1-2 weeks      MEDICATIONS ON DISCHARGE     Medication List        START taking these medications        Instructions   Alcohol Swabs   Doctor's comments: Per formulary preference. ICD-10 code: E11.9 Controlled type 2 Diabetes Mellitus  Wipe site with prep pad prior to injection.     * Blood Glucose Test Strips   Doctor's comments: Or per formulary preference. ICD-10 code: E11.9 Controlled type 2 Diabetes Mellitus  Use one  strip to test blood sugar three times daily before meals.     * Blood Glucose Meter Kit   Doctor's comments: Or per formulary preference. ICD-10 code: E11.9 Controlled type 2 Diabetes Mellitus  Test blood sugar as recommended by provider. 1 blood glucose monitoring kit.     Lancets   Doctor's  comments: Or per formulary preference. ICD-10 code: E11.9 Controlled type 2 Diabetes Mellitus  Use one  lancet to test blood sugar three times daily before meals.     metFORMIN 500 MG Tabs  Commonly known as: Glucophage   Take 1 Tablet by mouth 2 times a day with meals.  Dose: 500 mg     NS SOLN 100 mL with ertapenem 1 GM SOLR 1,000 mg   Infuse 1,000 mg into a venous catheter every 24 hours.  Dose: 1,000 mg           * This list has 2 medication(s) that are the same as other medications prescribed for you. Read the directions carefully, and ask your doctor or other care provider to review them with you.                  Allergies  No Known Allergies    DIET  Orders Placed This Encounter   Procedures    Diet Order Diet: Consistent CHO (Diabetic)     Standing Status:   Standing     Number of Occurrences:   1     Order Specific Question:   Diet:     Answer:   Consistent CHO (Diabetic) [4]       ACTIVITY  As tolerated.  Heel weight bearing only on left    CONSULTATIONS  Dr Ponce  Infectious Disease     PROCEDURES  I&D with drainage of an abscess from the plantar and dorsal aspects of the L foot with Dr Ponce on 7/12    LABORATORY  Lab Results   Component Value Date    SODIUM 141 07/19/2023    POTASSIUM 4.6 07/19/2023    CHLORIDE 104 07/19/2023    CO2 27 07/19/2023    GLUCOSE 98 07/19/2023    BUN 8 07/19/2023    CREATININE 0.68 07/19/2023    GLOMRATE 108 04/12/2022        Lab Results   Component Value Date    WBC 5.6 07/19/2023    HEMOGLOBIN 11.6 (L) 07/19/2023    HEMATOCRIT 36.0 (L) 07/19/2023    PLATELETCT 145 (L) 07/19/2023        Total time of the discharge process exceeds 37 minutes.

## 2023-07-19 NOTE — PROGRESS NOTES
Bedside report received from night RN. Assumed care of patient. Daily plan of care discussed. Pt resting comfortably in bed with no signs of distress noted. Breathing even and unlabored. 12 hour chart check complete. Hourly rounding in place.    0810 Hourly round complete. Pt denies needs. Aware of and agreeable to plan to discharge today if medically clear.    0942 Hourly round complete. Pt preparing for discharge.

## 2023-07-19 NOTE — PROGRESS NOTES
Pt discharged to home via wheelchair. Prescribed home medications reviewed per dc summary. Signed copy placed in chart. All follow up appointments listed. Questions and concerns answered appropriately.

## 2023-07-20 ENCOUNTER — OUTPATIENT INFUSION SERVICES (OUTPATIENT)
Dept: ONCOLOGY | Facility: MEDICAL CENTER | Age: 33
End: 2023-07-20
Attending: INTERNAL MEDICINE

## 2023-07-20 ENCOUNTER — NON-PROVIDER VISIT (OUTPATIENT)
Dept: WOUND CARE | Facility: MEDICAL CENTER | Age: 33
End: 2023-07-20
Attending: INTERNAL MEDICINE

## 2023-07-20 VITALS
HEART RATE: 101 BPM | TEMPERATURE: 98 F | RESPIRATION RATE: 20 BRPM | OXYGEN SATURATION: 97 % | SYSTOLIC BLOOD PRESSURE: 153 MMHG | DIASTOLIC BLOOD PRESSURE: 83 MMHG

## 2023-07-20 DIAGNOSIS — M86.172 ACUTE OSTEOMYELITIS OF LEFT FOOT (HCC): ICD-10-CM

## 2023-07-20 DIAGNOSIS — R78.81 BACTEREMIA: ICD-10-CM

## 2023-07-20 PROCEDURE — 97597 DBRDMT OPN WND 1ST 20 CM/<: CPT

## 2023-07-20 PROCEDURE — 99211 OFF/OP EST MAY X REQ PHY/QHP: CPT

## 2023-07-20 PROCEDURE — 700105 HCHG RX REV CODE 258: Performed by: INTERNAL MEDICINE

## 2023-07-20 PROCEDURE — 96365 THER/PROPH/DIAG IV INF INIT: CPT

## 2023-07-20 PROCEDURE — 700111 HCHG RX REV CODE 636 W/ 250 OVERRIDE (IP): Performed by: INTERNAL MEDICINE

## 2023-07-20 RX ORDER — 0.9 % SODIUM CHLORIDE 0.9 %
10 VIAL (ML) INJECTION PRN
Status: CANCELLED | OUTPATIENT
Start: 2023-07-21

## 2023-07-20 RX ORDER — 0.9 % SODIUM CHLORIDE 0.9 %
VIAL (ML) INJECTION PRN
Status: CANCELLED | OUTPATIENT
Start: 2023-07-21

## 2023-07-20 RX ORDER — 0.9 % SODIUM CHLORIDE 0.9 %
3 VIAL (ML) INJECTION PRN
Status: CANCELLED | OUTPATIENT
Start: 2023-07-21

## 2023-07-20 RX ADMIN — ERTAPENEM SODIUM 1000 MG: 1 INJECTION INTRAMUSCULAR; INTRAVENOUS at 18:07

## 2023-07-20 NOTE — PATIENT INSTRUCTIONS
-Keep your wound dressing clean, dry, and intact.    -Change your dressing if it becomes soiled, soaked, or falls off.    -Remove your compression stocking if you have severe pain, severe swelling, numbness, color change, or temperature change in your toes. If you need to remove your compression wrap, do so by unrolling it. Do not cut the compression wrap off to prevent cutting yourself on accident.    -Should you experience any significant changes in your wound(s), such as infection (redness, swelling, localized heat, increased pain, fever > 101 F, chills) or have any questions regarding your home care instructions, please contact the wound center at [phone]. If after hours, contact your primary care physician or go to the hospital emergency room.

## 2023-07-20 NOTE — PROGRESS NOTES
Pt arrived with knee scooter to IS for first day of daily Invanz. Pt will be receiving 6 weeks of daily antibiotics. POC discussed and pt verbalized understanding. Pt discharged today from inpatient week hospitalization for sepsis and osteomyelitis of the L foot. MICHAEL FRANCOIS PICC in place, flushes well with brisk blood return noted and dressing CDI. Invanz administered per MAR and pt tolerated well. PICC flushed, green cap applied and line wrapped in sterile gauze/protective sleeve. Pt confirms his appts in [facility], and pt discharged to self care in [facility].

## 2023-07-20 NOTE — CERTIFICATION
"Non Provider Encounter- Diabetic Foot Ulcer      HISTORY OF PRESENT ILLNESS  Wound History:    START OF CARE IN CLINIC: 2023    REFERRING PROVIDER: Zabrina Nina D.O.   WOUND- Diabetic foot ulcer   LOCATION: L plantar 1st MTH                                  L Materal Hallux                                  L medial Hallux   HISTORY: From 2023 OP Notes, \"He resented the emergency room with worsening left foot infection.  He had a ulcer on that side since about November which suddenly worsened.  We discussed the options.  Given his acute septic nature, he is indicated for urgent irrigation and debridement, which would likely need to be followed up with subsequent more formal surgery potentially involving amputation of the first ray or transmetatarsal region...There was a full-thickness ulcer all the way to bone on the plantar aspect under the first metatarsal head.  There is also a large abscess involving this area extending around to the dorsal aspect of the foot extending across the nearly entire medial lateral extent of the forefoot.\"    Pertinent Medical History:      DIABETES HX: Diagnosed with type 2 diabetes, and is currently managing with Metformin.  Checks blood sugars daily and reports that these typically run around 130.  Has had previous diabetes education.  Does have numbness in feet.  Usually wears regular tennis shoes. Does check feet routinely.  Has had previous foot ulcers or foot surgery.  Current occupation sale representative, working from home.       TOBACCO USE: Denies      OFFLOADING: none    Pertinent Labs and Diagnostics:    Labs:     A1c:   Lab Results   Component Value Date/Time    HBA1C 5.8 (H) 2023 12:09 AM          IMAGIN2023    VASCULAR STUDIES: 2/15/2023    LAST  WOUND CULTURE:  DATE : 2023 POS :Streptococcus intermedius,    Staphylococcus aureus, Corynebacterium striatum group, Enterococcus faecalis, Pasturella canis oralis    Being treated at " outpatient infusion with [facility] through 8/25      Patient allergies and medications reviewed via Epic.     WOUND ASSESSMENT-         Wound 11/09/22 Diabetic Ulcer MTH 1 Left (Active)   Wound Image   07/20/23 0800   Site Assessment Red;Yellow;Granulation tissue 07/20/23 0800   Periwound Assessment Callused;Maceration 07/20/23 0800   Margins Unattached edges 07/20/23 0800   Closure Secondary intention 07/20/23 0800   Drainage Amount Large 07/20/23 0800   Drainage Description Serosanguineous;Branham 07/20/23 0800   Treatments Cleansed;Site care;CSWD - Conservative Sharp Wound Debridement 07/20/23 0800   Wound Cleansing Foam Cleanser/Washcloth 07/20/23 0800   Periwound Protectant Skin Protectant Wipes to Periwound;Barrier Paste;Stoma Powder 07/20/23 0800   Dressing Status New drainage 07/20/23 0800   Dressing Changed New 07/20/23 0800   Dressing Cleansing/Solutions Not Applicable 07/20/23 0800   Dressing Options Hydrofiber Silver;Nonadhesive Foam;Absorbent Abdominal Pad;Dry Roll Gauze;Tubigrip 07/20/23 0800   Dressing Change/Treatment Frequency Every 48 hrs, and As Needed 07/20/23 0800   NEXT Dressing Change/Treatment Date 03/10/23 03/06/23 1147   NEXT Weekly Photo (Inpatient Only) 02/27/23 02/20/23 1654   Wound Team Following Weekly 07/20/23 0800   Non-staged Wound Description Full thickness 02/27/23 1129   Wound Length (cm) 1.9 cm 07/20/23 0800   Wound Width (cm) 2.2 cm 07/20/23 0800   Wound Depth (cm) 1.3 cm 07/20/23 0800   Wound Surface Area (cm^2) 4.18 cm^2 07/20/23 0800   Wound Volume (cm^3) 5.434 cm^3 07/20/23 0800   Post-Procedure Length (cm) 2.1 cm 07/20/23 0800   Post-Procedure Width (cm) 2.3 cm 07/20/23 0800   Post-Procedure Depth (cm) 1.3 cm 07/20/23 0800   Post-Procedure Surface Area (cm^2) 4.83 cm^2 07/20/23 0800   Post-Procedure Volume (cm^3) 6.279 cm^3 07/20/23 0800   Wound Healing % -36 07/20/23 0800   Wound Bed Granulation (%) 50 % 03/06/23 1147   Wound Bed Eschar (%) 50 % 02/27/23 1129   Tunneling (cm) 0  cm 02/20/23 1654   Undermining (cm) 0 cm 02/20/23 1654   Wound Odor Mild 07/20/23 0800   Pulses Left;1+;DP;PT 07/20/23 0800   Exposed Structures None 03/06/23 1147   WOUND NURSE ONLY - Time Spent with Patient (mins) 60 03/06/23 1147       Wound 07/12/23 Incision Foot Left Penrose,  (Active)   Wound Image   07/20/23 0800   Site Assessment Red;Boggy;Drainage 07/20/23 0800   Periwound Assessment Edema;Blistered;Maceration 07/20/23 0800   Margins Unattached edges 07/20/23 0800   Closure Secondary intention 07/20/23 0800   Drainage Amount Copious 07/20/23 0800   Drainage Description Serosanguineous;Branham 07/20/23 0800   Treatments Cleansed;Site care 07/20/23 0800   Wound Cleansing Foam Cleanser/Washcloth 07/20/23 0800   Periwound Protectant Skin Protectant Wipes to Periwound 07/20/23 0800   Dressing Status New drainage 07/20/23 0800   Dressing Changed New 07/20/23 0800   Dressing Cleansing/Solutions Not Applicable 07/20/23 0800   Dressing Options Hydrofiber Silver;Nonadhesive Foam;Absorbent Abdominal Pad;Dry Roll Gauze;Tubigrip 07/20/23 0800   Dressing Change/Treatment Frequency Weekly, and As Needed 07/20/23 0800   Wound Team Following Weekly 07/20/23 0800   Non-staged Wound Description Full thickness 07/20/23 0800   Wound Length (cm) 4 cm 07/20/23 0800   Wound Width (cm) 1.1 cm 07/20/23 0800   Wound Depth (cm) 1.7 cm 07/20/23 0800   Wound Surface Area (cm^2) 4.4 cm^2 07/20/23 0800   Wound Volume (cm^3) 7.48 cm^3 07/20/23 0800   Post-Procedure Length (cm) 4 cm 07/20/23 0800   Post-Procedure Width (cm) 1.1 cm 07/20/23 0800   Post-Procedure Depth (cm) 1.7 cm 07/20/23 0800   Post-Procedure Surface Area (cm^2) 4.4 cm^2 07/20/23 0800   Post-Procedure Volume (cm^3) 7.48 cm^3 07/20/23 0800   Wound Healing % 81 07/20/23 0800   Wound Odor Mild 07/20/23 0800   Pulses Left;1+;DP;PT 07/20/23 0800       Wound 07/20/23 Full Thickness Wound Toe, Hallux Medial Left (Active)   Wound Image   07/20/23 0800   Site Assessment  Bleeding;Red;Pink;Drainage 07/20/23 0800   Periwound Assessment Maceration;Blistered 07/20/23 0800   Margins Unattached edges 07/20/23 0800   Closure Secondary intention 07/20/23 0800   Drainage Amount Large 07/20/23 0800   Drainage Description Tan;Serosanguineous;Purulent 07/20/23 0800   Treatments Cleansed;Site care 07/20/23 0800   Wound Cleansing Foam Cleanser/Washcloth 07/20/23 0800   Periwound Protectant Barrier Paste;Stoma Powder;Skin Protectant Wipes to Periwound 07/20/23 0800   Dressing Status New drainage 07/20/23 0800   Dressing Changed New 07/20/23 0800   Dressing Cleansing/Solutions Not Applicable 07/20/23 0800   Dressing Options Hydrofiber Silver Strip;Hydrofiber Silver;Nonadhesive Foam;Absorbent Abdominal Pad;Dry Roll Gauze;Tubigrip 07/20/23 0800   Dressing Change/Treatment Frequency Every 48 hrs, and As Needed 07/20/23 0800   Wound Team Following Weekly 07/20/23 0800   Non-staged Wound Description Full thickness 07/20/23 0800   Wound Length (cm) 0.5 cm 07/20/23 0800   Wound Width (cm) 0.4 cm 07/20/23 0800   Wound Depth (cm) 1.5 cm 07/20/23 0800   Wound Surface Area (cm^2) 0.2 cm^2 07/20/23 0800   Wound Volume (cm^3) 0.3 cm^3 07/20/23 0800   Post-Procedure Length (cm) 0.5 cm 07/20/23 0800   Post-Procedure Width (cm) 0.4 cm 07/20/23 0800   Post-Procedure Depth (cm) 1.5 cm 07/20/23 0800   Post-Procedure Surface Area (cm^2) 0.2 cm^2 07/20/23 0800   Post-Procedure Volume (cm^3) 0.3 cm^3 07/20/23 0800   Wound Odor Mild 07/20/23 0800   Pulses 1+;Left;DP;PT 07/20/23 0800           Pre-debridement photo                Procedures:       -No lidocaine applied to wound beds r/t neuropathy - Scissors and forceps used to trim away callus and blistered/macerated tissue.   -Periwound callus of plantar 1st MTH debrided to skin level  -Refer to flowsheet for wound care details.       PATIENT EDUCATION  -Educated on skin hygiene, foot checks and dressing selection and purpose  -Provided with an extra set of dressings  "for one dressings change before next wound clinic appointment.   - Importance of tight glucose control for wound healing   - Implications of loss of protective sensation (LOPS) discussed with patient- including increased risk for amputation.  - Advised to check feet at least daily, moisturize feet, and to always wear protective foot wear.   -  Importance of offloading foot to assist with wound healing  - Advised pt not to trim nails or calluses, seek foot/nail care from podiatrist or certified foot/nail nurse  - Importance of adequate nutrition for wound healing       -Order for wound care supplies sent to [facility]. Patient understands as a \"self-pay\" that he may have to pay out of pocket.   "

## 2023-07-21 ENCOUNTER — OUTPATIENT INFUSION SERVICES (OUTPATIENT)
Dept: ONCOLOGY | Facility: MEDICAL CENTER | Age: 33
End: 2023-07-21
Attending: INTERNAL MEDICINE

## 2023-07-21 VITALS
RESPIRATION RATE: 16 BRPM | SYSTOLIC BLOOD PRESSURE: 129 MMHG | TEMPERATURE: 97.5 F | WEIGHT: 208.11 LBS | HEART RATE: 95 BPM | BODY MASS INDEX: 24.67 KG/M2 | DIASTOLIC BLOOD PRESSURE: 77 MMHG | OXYGEN SATURATION: 98 %

## 2023-07-21 DIAGNOSIS — R78.81 BACTEREMIA: ICD-10-CM

## 2023-07-21 DIAGNOSIS — M86.172 ACUTE OSTEOMYELITIS OF LEFT FOOT (HCC): ICD-10-CM

## 2023-07-21 PROCEDURE — 96365 THER/PROPH/DIAG IV INF INIT: CPT

## 2023-07-21 PROCEDURE — 700111 HCHG RX REV CODE 636 W/ 250 OVERRIDE (IP): Mod: JZ | Performed by: INTERNAL MEDICINE

## 2023-07-21 PROCEDURE — 700105 HCHG RX REV CODE 258: Performed by: INTERNAL MEDICINE

## 2023-07-21 RX ADMIN — ERTAPENEM 1000 MG: 1 INJECTION, POWDER, LYOPHILIZED, FOR SOLUTION INTRAMUSCULAR; INTRAVENOUS at 17:47

## 2023-07-21 ASSESSMENT — FIBROSIS 4 INDEX: FIB4 SCORE: 1.09

## 2023-07-21 NOTE — PROGRESS NOTES
Pt presented to infusion center for daily Invanz. Pt reports no significant changes since yesterday. Left arm PICC line in place, flushed and brisk blood return observed. Invanz infused with no s/s of adverse reaction. PICC line flushed, brisk blood return again observed, wrapped in gauze and protective sleeve. Has next appt, left on foot to self care using scooter.

## 2023-07-22 ENCOUNTER — OUTPATIENT INFUSION SERVICES (OUTPATIENT)
Dept: ONCOLOGY | Facility: MEDICAL CENTER | Age: 33
End: 2023-07-22
Attending: INTERNAL MEDICINE

## 2023-07-22 VITALS
HEART RATE: 93 BPM | DIASTOLIC BLOOD PRESSURE: 79 MMHG | OXYGEN SATURATION: 96 % | SYSTOLIC BLOOD PRESSURE: 141 MMHG | TEMPERATURE: 98 F | RESPIRATION RATE: 16 BRPM

## 2023-07-22 DIAGNOSIS — R78.81 BACTEREMIA: ICD-10-CM

## 2023-07-22 DIAGNOSIS — M86.172 ACUTE OSTEOMYELITIS OF LEFT FOOT (HCC): ICD-10-CM

## 2023-07-22 LAB — TEST NAME 95000: NORMAL

## 2023-07-22 PROCEDURE — 96365 THER/PROPH/DIAG IV INF INIT: CPT

## 2023-07-22 PROCEDURE — 700111 HCHG RX REV CODE 636 W/ 250 OVERRIDE (IP): Performed by: INTERNAL MEDICINE

## 2023-07-22 PROCEDURE — 700105 HCHG RX REV CODE 258: Performed by: INTERNAL MEDICINE

## 2023-07-22 RX ORDER — 0.9 % SODIUM CHLORIDE 0.9 %
10 VIAL (ML) INJECTION PRN
Status: CANCELLED | OUTPATIENT
Start: 2023-07-23

## 2023-07-22 RX ORDER — 0.9 % SODIUM CHLORIDE 0.9 %
VIAL (ML) INJECTION PRN
Status: CANCELLED | OUTPATIENT
Start: 2023-07-23

## 2023-07-22 RX ORDER — 0.9 % SODIUM CHLORIDE 0.9 %
3 VIAL (ML) INJECTION PRN
Status: CANCELLED | OUTPATIENT
Start: 2023-07-23

## 2023-07-22 RX ADMIN — ERTAPENEM SODIUM 1000 MG: 1 INJECTION INTRAMUSCULAR; INTRAVENOUS at 17:34

## 2023-07-22 NOTE — PROGRESS NOTES
Pt arrived with knee scooter to IS for daily Invanz. POC discussed and pt verbalized understanding.  FIDELE SL PICC in place, flushes well with brisk blood return noted and dressing CDI. Invanz administered per MAR and pt tolerated well. PICC flushed and line wrapped in sterile gauze/protective sleeve. [facility], and pt discharged to self care in [facility].

## 2023-07-23 ENCOUNTER — OUTPATIENT INFUSION SERVICES (OUTPATIENT)
Dept: ONCOLOGY | Facility: MEDICAL CENTER | Age: 33
End: 2023-07-23
Attending: INTERNAL MEDICINE

## 2023-07-23 VITALS
HEART RATE: 87 BPM | RESPIRATION RATE: 18 BRPM | DIASTOLIC BLOOD PRESSURE: 75 MMHG | SYSTOLIC BLOOD PRESSURE: 140 MMHG | TEMPERATURE: 98.2 F | OXYGEN SATURATION: 98 %

## 2023-07-23 DIAGNOSIS — R78.81 BACTEREMIA: ICD-10-CM

## 2023-07-23 DIAGNOSIS — M86.172 ACUTE OSTEOMYELITIS OF LEFT FOOT (HCC): ICD-10-CM

## 2023-07-23 PROCEDURE — 96365 THER/PROPH/DIAG IV INF INIT: CPT

## 2023-07-23 PROCEDURE — 700105 HCHG RX REV CODE 258: Performed by: INTERNAL MEDICINE

## 2023-07-23 PROCEDURE — 700111 HCHG RX REV CODE 636 W/ 250 OVERRIDE (IP): Mod: JZ | Performed by: INTERNAL MEDICINE

## 2023-07-23 RX ORDER — 0.9 % SODIUM CHLORIDE 0.9 %
VIAL (ML) INJECTION PRN
Status: CANCELLED | OUTPATIENT
Start: 2023-07-24

## 2023-07-23 RX ORDER — 0.9 % SODIUM CHLORIDE 0.9 %
10 VIAL (ML) INJECTION PRN
Status: CANCELLED | OUTPATIENT
Start: 2023-07-24

## 2023-07-23 RX ORDER — 0.9 % SODIUM CHLORIDE 0.9 %
3 VIAL (ML) INJECTION PRN
Status: CANCELLED | OUTPATIENT
Start: 2023-07-24

## 2023-07-23 RX ADMIN — ERTAPENEM 1000 MG: 1 INJECTION, POWDER, LYOPHILIZED, FOR SOLUTION INTRAMUSCULAR; INTRAVENOUS at 17:37

## 2023-07-24 ENCOUNTER — OUTPATIENT INFUSION SERVICES (OUTPATIENT)
Dept: ONCOLOGY | Facility: MEDICAL CENTER | Age: 33
End: 2023-07-24
Attending: INTERNAL MEDICINE

## 2023-07-24 VITALS
OXYGEN SATURATION: 95 % | WEIGHT: 208.11 LBS | DIASTOLIC BLOOD PRESSURE: 74 MMHG | TEMPERATURE: 98.3 F | BODY MASS INDEX: 24.67 KG/M2 | HEART RATE: 82 BPM | SYSTOLIC BLOOD PRESSURE: 136 MMHG | RESPIRATION RATE: 18 BRPM

## 2023-07-24 DIAGNOSIS — M86.172 ACUTE OSTEOMYELITIS OF LEFT FOOT (HCC): ICD-10-CM

## 2023-07-24 DIAGNOSIS — R78.81 BACTEREMIA: ICD-10-CM

## 2023-07-24 LAB
ALBUMIN SERPL BCP-MCNC: 4.1 G/DL (ref 3.2–4.9)
ALBUMIN/GLOB SERPL: 1.5 G/DL
ALP SERPL-CCNC: 210 U/L (ref 30–99)
ALT SERPL-CCNC: 26 U/L (ref 2–50)
ANION GAP SERPL CALC-SCNC: 12 MMOL/L (ref 7–16)
AST SERPL-CCNC: 23 U/L (ref 12–45)
BASOPHILS # BLD AUTO: 0.8 % (ref 0–1.8)
BASOPHILS # BLD: 0.07 K/UL (ref 0–0.12)
BILIRUB SERPL-MCNC: 0.4 MG/DL (ref 0.1–1.5)
BUN SERPL-MCNC: 22 MG/DL (ref 8–22)
CALCIUM ALBUM COR SERPL-MCNC: 9.5 MG/DL (ref 8.5–10.5)
CALCIUM SERPL-MCNC: 9.6 MG/DL (ref 8.5–10.5)
CHLORIDE SERPL-SCNC: 100 MMOL/L (ref 96–112)
CO2 SERPL-SCNC: 26 MMOL/L (ref 20–33)
CREAT SERPL-MCNC: 0.79 MG/DL (ref 0.5–1.4)
CRP SERPL HS-MCNC: 0.6 MG/DL (ref 0–0.75)
EOSINOPHIL # BLD AUTO: 0.13 K/UL (ref 0–0.51)
EOSINOPHIL NFR BLD: 1.5 % (ref 0–6.9)
ERYTHROCYTE [DISTWIDTH] IN BLOOD BY AUTOMATED COUNT: 44.2 FL (ref 35.9–50)
ERYTHROCYTE [SEDIMENTATION RATE] IN BLOOD BY WESTERGREN METHOD: 10 MM/HOUR (ref 0–20)
GFR SERPLBLD CREATININE-BSD FMLA CKD-EPI: 120 ML/MIN/1.73 M 2
GLOBULIN SER CALC-MCNC: 2.7 G/DL (ref 1.9–3.5)
GLUCOSE SERPL-MCNC: 133 MG/DL (ref 65–99)
HCT VFR BLD AUTO: 36.3 % (ref 42–52)
HGB BLD-MCNC: 11.6 G/DL (ref 14–18)
IMM GRANULOCYTES # BLD AUTO: 0.2 K/UL (ref 0–0.11)
IMM GRANULOCYTES NFR BLD AUTO: 2.3 % (ref 0–0.9)
LYMPHOCYTES # BLD AUTO: 1.61 K/UL (ref 1–4.8)
LYMPHOCYTES NFR BLD: 18.6 % (ref 22–41)
MCH RBC QN AUTO: 28.7 PG (ref 27–33)
MCHC RBC AUTO-ENTMCNC: 32 G/DL (ref 32.3–36.5)
MCV RBC AUTO: 89.9 FL (ref 81.4–97.8)
MONOCYTES # BLD AUTO: 0.63 K/UL (ref 0–0.85)
MONOCYTES NFR BLD AUTO: 7.3 % (ref 0–13.4)
NEUTROPHILS # BLD AUTO: 6 K/UL (ref 1.82–7.42)
NEUTROPHILS NFR BLD: 69.5 % (ref 44–72)
NRBC # BLD AUTO: 0 K/UL
NRBC BLD-RTO: 0 /100 WBC (ref 0–0.2)
OUTPT INFUS CBC COMMENT OICOM: ABNORMAL
PLATELET # BLD AUTO: 148 K/UL (ref 164–446)
PMV BLD AUTO: 11.5 FL (ref 9–12.9)
POTASSIUM SERPL-SCNC: 4.3 MMOL/L (ref 3.6–5.5)
PROT SERPL-MCNC: 6.8 G/DL (ref 6–8.2)
RBC # BLD AUTO: 4.04 M/UL (ref 4.7–6.1)
SODIUM SERPL-SCNC: 138 MMOL/L (ref 135–145)
WBC # BLD AUTO: 8.6 K/UL (ref 4.8–10.8)

## 2023-07-24 PROCEDURE — 80053 COMPREHEN METABOLIC PANEL: CPT

## 2023-07-24 PROCEDURE — 86140 C-REACTIVE PROTEIN: CPT

## 2023-07-24 PROCEDURE — 700105 HCHG RX REV CODE 258: Performed by: INTERNAL MEDICINE

## 2023-07-24 PROCEDURE — 85652 RBC SED RATE AUTOMATED: CPT

## 2023-07-24 PROCEDURE — 36592 COLLECT BLOOD FROM PICC: CPT

## 2023-07-24 PROCEDURE — 96365 THER/PROPH/DIAG IV INF INIT: CPT

## 2023-07-24 PROCEDURE — 99211 OFF/OP EST MAY X REQ PHY/QHP: CPT

## 2023-07-24 PROCEDURE — 700111 HCHG RX REV CODE 636 W/ 250 OVERRIDE (IP): Mod: JZ | Performed by: INTERNAL MEDICINE

## 2023-07-24 PROCEDURE — 85025 COMPLETE CBC W/AUTO DIFF WBC: CPT

## 2023-07-24 RX ORDER — 0.9 % SODIUM CHLORIDE 0.9 %
VIAL (ML) INJECTION PRN
Status: CANCELLED | OUTPATIENT
Start: 2023-07-25

## 2023-07-24 RX ORDER — 0.9 % SODIUM CHLORIDE 0.9 %
10 VIAL (ML) INJECTION PRN
Status: CANCELLED | OUTPATIENT
Start: 2023-07-25

## 2023-07-24 RX ORDER — 0.9 % SODIUM CHLORIDE 0.9 %
3 VIAL (ML) INJECTION PRN
Status: CANCELLED | OUTPATIENT
Start: 2023-07-25

## 2023-07-24 RX ADMIN — ERTAPENEM 1000 MG: 1 INJECTION, POWDER, LYOPHILIZED, FOR SOLUTION INTRAMUSCULAR; INTRAVENOUS at 18:12

## 2023-07-24 ASSESSMENT — FIBROSIS 4 INDEX: FIB4 SCORE: 1.09

## 2023-07-24 NOTE — PROGRESS NOTES
Pt presented to infusion center for daily ertapenem. Pt reports no significant changes since yesterday. Left arm PICC line in place, flushed and brisk blood return observed. Ertapenem infused with no s/s of adverse reaction. PICC line flushed, brisk blood return again observed, wrapped in gauze and protective sleeve. Has next appt, Esequiel discharged home to self care.

## 2023-07-25 ENCOUNTER — OUTPATIENT INFUSION SERVICES (OUTPATIENT)
Dept: ONCOLOGY | Facility: MEDICAL CENTER | Age: 33
End: 2023-07-25
Attending: INTERNAL MEDICINE

## 2023-07-25 VITALS
TEMPERATURE: 98 F | HEART RATE: 85 BPM | OXYGEN SATURATION: 93 % | RESPIRATION RATE: 18 BRPM | SYSTOLIC BLOOD PRESSURE: 139 MMHG | DIASTOLIC BLOOD PRESSURE: 72 MMHG

## 2023-07-25 DIAGNOSIS — R78.81 BACTEREMIA: ICD-10-CM

## 2023-07-25 DIAGNOSIS — M86.172 ACUTE OSTEOMYELITIS OF LEFT FOOT (HCC): ICD-10-CM

## 2023-07-25 PROCEDURE — 700105 HCHG RX REV CODE 258: Performed by: INTERNAL MEDICINE

## 2023-07-25 PROCEDURE — 700111 HCHG RX REV CODE 636 W/ 250 OVERRIDE (IP): Mod: JZ | Performed by: INTERNAL MEDICINE

## 2023-07-25 PROCEDURE — 96365 THER/PROPH/DIAG IV INF INIT: CPT

## 2023-07-25 RX ORDER — 0.9 % SODIUM CHLORIDE 0.9 %
VIAL (ML) INJECTION PRN
Status: CANCELLED | OUTPATIENT
Start: 2023-07-26

## 2023-07-25 RX ORDER — 0.9 % SODIUM CHLORIDE 0.9 %
3 VIAL (ML) INJECTION PRN
Status: CANCELLED | OUTPATIENT
Start: 2023-07-26

## 2023-07-25 RX ORDER — 0.9 % SODIUM CHLORIDE 0.9 %
10 VIAL (ML) INJECTION PRN
Status: CANCELLED | OUTPATIENT
Start: 2023-07-26

## 2023-07-25 RX ADMIN — ERTAPENEM 1000 MG: 1 INJECTION, POWDER, LYOPHILIZED, FOR SOLUTION INTRAMUSCULAR; INTRAVENOUS at 18:04

## 2023-07-26 ENCOUNTER — OUTPATIENT INFUSION SERVICES (OUTPATIENT)
Dept: ONCOLOGY | Facility: MEDICAL CENTER | Age: 33
End: 2023-07-26
Attending: INTERNAL MEDICINE

## 2023-07-26 VITALS
TEMPERATURE: 96.8 F | SYSTOLIC BLOOD PRESSURE: 124 MMHG | RESPIRATION RATE: 18 BRPM | WEIGHT: 208.11 LBS | OXYGEN SATURATION: 97 % | HEART RATE: 90 BPM | BODY MASS INDEX: 24.67 KG/M2 | DIASTOLIC BLOOD PRESSURE: 69 MMHG

## 2023-07-26 DIAGNOSIS — R78.81 BACTEREMIA: ICD-10-CM

## 2023-07-26 DIAGNOSIS — M86.172 ACUTE OSTEOMYELITIS OF LEFT FOOT (HCC): ICD-10-CM

## 2023-07-26 PROCEDURE — 700105 HCHG RX REV CODE 258: Performed by: INTERNAL MEDICINE

## 2023-07-26 PROCEDURE — 96365 THER/PROPH/DIAG IV INF INIT: CPT

## 2023-07-26 PROCEDURE — 700111 HCHG RX REV CODE 636 W/ 250 OVERRIDE (IP): Mod: JZ | Performed by: INTERNAL MEDICINE

## 2023-07-26 RX ORDER — 0.9 % SODIUM CHLORIDE 0.9 %
VIAL (ML) INJECTION PRN
Status: CANCELLED | OUTPATIENT
Start: 2023-07-27

## 2023-07-26 RX ORDER — 0.9 % SODIUM CHLORIDE 0.9 %
10 VIAL (ML) INJECTION PRN
Status: CANCELLED | OUTPATIENT
Start: 2023-07-27

## 2023-07-26 RX ORDER — 0.9 % SODIUM CHLORIDE 0.9 %
3 VIAL (ML) INJECTION PRN
Status: CANCELLED | OUTPATIENT
Start: 2023-07-27

## 2023-07-26 RX ADMIN — ERTAPENEM 1000 MG: 1 INJECTION, POWDER, LYOPHILIZED, FOR SOLUTION INTRAMUSCULAR; INTRAVENOUS at 18:00

## 2023-07-26 ASSESSMENT — FIBROSIS 4 INDEX: FIB4 SCORE: 1.01

## 2023-07-26 NOTE — PROGRESS NOTES
Esequiel returns for IVABX. Invanz infused as ordered. Esequiel tolerated well and without incident. Carlos Alberto changed. PICC line in good condition and has positive blood return. PICC line flushed with saline per protocol. Next appointment scheduled. Discharged to self care; no apparent distress noted.

## 2023-07-27 ENCOUNTER — OFFICE VISIT (OUTPATIENT)
Dept: WOUND CARE | Facility: MEDICAL CENTER | Age: 33
End: 2023-07-27
Attending: INTERNAL MEDICINE

## 2023-07-27 ENCOUNTER — OUTPATIENT INFUSION SERVICES (OUTPATIENT)
Dept: ONCOLOGY | Facility: MEDICAL CENTER | Age: 33
End: 2023-07-27
Attending: INTERNAL MEDICINE

## 2023-07-27 VITALS
TEMPERATURE: 97.6 F | OXYGEN SATURATION: 98 % | SYSTOLIC BLOOD PRESSURE: 146 MMHG | HEART RATE: 75 BPM | RESPIRATION RATE: 18 BRPM | DIASTOLIC BLOOD PRESSURE: 89 MMHG

## 2023-07-27 VITALS
HEART RATE: 87 BPM | DIASTOLIC BLOOD PRESSURE: 68 MMHG | SYSTOLIC BLOOD PRESSURE: 118 MMHG | OXYGEN SATURATION: 99 % | RESPIRATION RATE: 18 BRPM | TEMPERATURE: 98.5 F

## 2023-07-27 DIAGNOSIS — S91.302D OPEN WOUND OF LEFT FOOT, SUBSEQUENT ENCOUNTER: ICD-10-CM

## 2023-07-27 DIAGNOSIS — L08.9 WOUND INFECTION: ICD-10-CM

## 2023-07-27 DIAGNOSIS — M86.672 OTHER CHRONIC OSTEOMYELITIS OF LEFT FOOT (HCC): ICD-10-CM

## 2023-07-27 DIAGNOSIS — M86.172 ACUTE OSTEOMYELITIS OF LEFT FOOT (HCC): ICD-10-CM

## 2023-07-27 DIAGNOSIS — R78.81 BACTEREMIA: ICD-10-CM

## 2023-07-27 DIAGNOSIS — T14.8XXA WOUND INFECTION: ICD-10-CM

## 2023-07-27 PROCEDURE — 99214 OFFICE O/P EST MOD 30 MIN: CPT

## 2023-07-27 PROCEDURE — 700105 HCHG RX REV CODE 258: Performed by: INTERNAL MEDICINE

## 2023-07-27 PROCEDURE — 11042 DBRDMT SUBQ TIS 1ST 20SQCM/<: CPT | Performed by: NURSE PRACTITIONER

## 2023-07-27 PROCEDURE — 3077F SYST BP >= 140 MM HG: CPT | Performed by: NURSE PRACTITIONER

## 2023-07-27 PROCEDURE — 700111 HCHG RX REV CODE 636 W/ 250 OVERRIDE (IP): Mod: JZ | Performed by: INTERNAL MEDICINE

## 2023-07-27 PROCEDURE — 11043 DBRDMT MUSC&/FSCA 1ST 20/<: CPT

## 2023-07-27 PROCEDURE — 3079F DIAST BP 80-89 MM HG: CPT | Performed by: NURSE PRACTITIONER

## 2023-07-27 PROCEDURE — 99214 OFFICE O/P EST MOD 30 MIN: CPT | Mod: 25 | Performed by: NURSE PRACTITIONER

## 2023-07-27 PROCEDURE — 96365 THER/PROPH/DIAG IV INF INIT: CPT

## 2023-07-27 RX ORDER — 0.9 % SODIUM CHLORIDE 0.9 %
10 VIAL (ML) INJECTION PRN
Status: CANCELLED | OUTPATIENT
Start: 2023-07-28

## 2023-07-27 RX ORDER — 0.9 % SODIUM CHLORIDE 0.9 %
VIAL (ML) INJECTION PRN
Status: CANCELLED | OUTPATIENT
Start: 2023-07-28

## 2023-07-27 RX ORDER — 0.9 % SODIUM CHLORIDE 0.9 %
3 VIAL (ML) INJECTION PRN
Status: CANCELLED | OUTPATIENT
Start: 2023-07-28

## 2023-07-27 RX ADMIN — ERTAPENEM 1000 MG: 1 INJECTION, POWDER, LYOPHILIZED, FOR SOLUTION INTRAMUSCULAR; INTRAVENOUS at 17:41

## 2023-07-27 ASSESSMENT — ENCOUNTER SYMPTOMS
PALPITATIONS: 0
VOMITING: 0
NAUSEA: 0
SHORTNESS OF BREATH: 0
CHILLS: 0
FEVER: 0

## 2023-07-27 NOTE — PROGRESS NOTES
Karan presented to infusion center for daily Invanz for L foot osteomyelitis utilizing a knee scooter.  Pt reports no significant changes since yesterday. L arm PICC line in place, flushed and brisk blood return observed. Invanz infused with no s/s of adverse reaction. PICC line flushed, brisk blood return again observed and wrapped with protective sleeve. Future appointment reviewed and Karan was discharged home in no acute distress.

## 2023-07-27 NOTE — PATIENT INSTRUCTIONS
-Keep your wound dressing clean, dry, and intact.    -Change your dressing every day and if it becomes soiled, soaked, or falls off.    -Should you experience any significant changes in your wound(s), such as infection (redness, swelling, localized heat, increased pain, fever > 101 F, chills) or have any questions regarding your home care instructions, please contact the wound center at [phone]. If after hours, contact your primary care physician or go to the hospital emergency room.

## 2023-07-27 NOTE — PROGRESS NOTES
Provider Encounter- Full Thickness wound    HISTORY OF PRESENT ILLNESS  Wound History:   START OF CARE IN CLINIC: 07/27/23   REFERRING PROVIDER: Zabrina Nina   WOUND- Full Thickness Wound   LOCATION: Left Plantar foot.    HISTORY: Patient is a former alcoholic with type 2 diabetes and GERD who presented to [facility] on 7/12/2023 with a diabetic foot wound to the left plantar first MTH.  MRI showed osteomyelitis of the first metatarsal and proximal phalanx.  Dr. Melo took the patient back to surgery for an I&D with drainage of an abscess from the plantar and dorsal aspects of the left foot.  It was recommended at the time that the patient undergo amputation however patient wanted to try to salvage the toe.  Blood cultures were positive on 7/12/2023 for strep epidermidis, Corynebacterium, Pasteurella, bacterial drains, Prevotella and Enterococcus.  ID was consulted recommended 6 weeks of ertapenem guarding on 7/14.  Patient was given a knee roller scooter to offload the foot.  Patient was referred to [facility] for continued care of the left first MTH wound.    Pertinent Medical History: Portal venous hypertension, type 2 diabetes, alcoholic hepatitis, osteomyelitis of left foot    TOBACCO USE:      Patient's problem list, allergies, and current medications reviewed and updated in Epic    Interval History:  7/27/2023: Clinic visit with ARJUN Cruz.  Patient reports that he was instructed to leave the dressing intact unless there is strikethrough.  The patient's dressing did have significant strikethrough however he did not know much drainage was truly under the bandage.  Patient has significant maceration with degradation of the wound beds due to excessive drainage.      REVIEW OF SYSTEMS:   Review of Systems   Constitutional:  Negative for chills and fever.   Respiratory:  Negative for shortness of breath.    Cardiovascular:  Negative for chest pain and palpitations.   Gastrointestinal:   Negative for nausea and vomiting.   Skin:  Negative for itching and rash.        Open wound to left foot       PHYSICAL EXAMINATION:   BP (!) 146/89   Pulse 75   Temp 36.4 °C (97.6 °F) (Tympanic)   Resp 18   SpO2 98%     Physical Exam  Cardiovascular:      Rate and Rhythm: Normal rate.   Pulmonary:      Effort: Pulmonary effort is normal.   Musculoskeletal:      Comments: Swelling of the left foot and hallux   Skin:     Comments: Full-thickness wounds to the plantar, medial and dorsal left hallux.  Turbid fluid expressed from the left medial opening. Penrose drain removed in clinic today.    Neurological:      Mental Status: He is alert and oriented to person, place, and time.       WOUND ASSESSMENT  Wound 11/09/22 Diabetic Ulcer MTH 1 Plantar Left (Active)   Wound Image    07/27/23 0830   Site Assessment Red;Yellow;Other (Comment);Drainage 07/27/23 0830   Periwound Assessment Maceration;Callused;Edema 07/27/23 0830   Margins Unattached edges 07/27/23 0830   Closure Secondary intention 07/20/23 0800   Drainage Amount Copious 07/27/23 0830   Drainage Description Serosanguineous;[place]ham 07/27/23 0830   Treatments Cleansed;Provider debridement 07/27/23 0830   Wound Cleansing Hypochlorus Acid 07/27/23 0830   Periwound Protectant Barrier Paste 07/27/23 0830   Dressing Status New drainage 07/20/23 0800   Dressing Changed Changed 07/27/23 0830   Dressing Cleansing/Solutions Not Applicable 07/27/23 0830   Dressing Options Hydrofiber Silver Strip;Super Absorbent Pad;Dry Roll Gauze;Hypafix Tape;Tubigrip 07/27/23 0830   Dressing Change/Treatment Frequency Daily, and As Needed 07/27/23 0830   NEXT Dressing Change/Treatment Date 03/10/23 03/06/23 1147   Wound Team Following Weekly 07/27/23 0830   Non-staged Wound Description Full thickness 07/27/23 0830   Wound Length (cm) 1.6 cm 07/27/23 0830   Wound Width (cm) 2.1 cm 07/27/23 0830   Wound Depth (cm) 1.3 cm 07/20/23 0800   Wound Surface Area (cm^2) 3.36 cm^2 07/27/23 0830    Wound Volume (cm^3) 5.434 cm^3 07/20/23 0800   Post-Procedure Length (cm) 1.6 cm 07/27/23 0830   Post-Procedure Width (cm) 2.2 cm 07/27/23 0830   Post-Procedure Depth (cm) 1.3 cm 07/20/23 0800   Post-Procedure Surface Area (cm^2) 3.52 cm^2 07/27/23 0830   Post-Procedure Volume (cm^3) 6.279 cm^3 07/20/23 0800   Wound Healing % -36 07/20/23 0800   Wound Bed Granulation (%) 50 % 03/06/23 1147   Wound Bed Eschar (%) 50 % 02/27/23 1129   Undermining (cm) 0 cm 07/27/23 0830   Wound Odor Mild;Foul 07/27/23 0830   Pulses Left;1+;DP;PT 07/20/23 0800   Exposed Structures Other (Comments) 07/27/23 0830   WOUND NURSE ONLY - Time Spent with Patient (mins) 60 03/06/23 1147   Number of days: 260       Wound 07/12/23 Incision MTH 1 Medial Left (Active)   Wound Image    07/27/23 0830   Site Assessment Red;Yellow;Drainage 07/27/23 0830   Periwound Assessment Edema;Maceration;Other (Comment) 07/27/23 0830   Margins Unattached edges 07/27/23 0830   Closure Secondary intention 07/20/23 0800   Drainage Amount Copious 07/27/23 0830   Drainage Description Tan;Serosanguineous 07/27/23 0830   Treatments Cleansed;Provider debridement 07/27/23 0830   Offloading/DME Other (comment) 07/27/23 0830   Wound Cleansing Hypochlorus Acid 07/27/23 0830   Periwound Protectant Barrier Paste;Mepitel 07/27/23 0830   Dressing Status New drainage 07/20/23 0800   Dressing Changed Changed 07/27/23 0830   Dressing Cleansing/Solutions Not Applicable 07/27/23 0830   Dressing Options Hydrofiber Silver Strip;Super Absorbent Pad;Dry Roll Gauze;Hypafix Tape;Tubigrip 07/27/23 0830   Dressing Change/Treatment Frequency Daily, and As Needed 07/27/23 0830   Number of Sutures Removed 2 07/27/23 0830   Wound Team Following Weekly 07/27/23 0830   Non-staged Wound Description Full thickness 07/27/23 0830   Wound Length (cm) 4.5 cm 07/27/23 0830   Wound Width (cm) 1.6 cm 07/27/23 0830   Wound Depth (cm) 1.7 cm 07/20/23 0800   Wound Surface Area (cm^2) 7.2 cm^2 07/27/23 0830    Wound Volume (cm^3) 7.48 cm^3 07/20/23 0800   Post-Procedure Length (cm) 4 cm 07/27/23 0830   Post-Procedure Width (cm) 1.5 cm 07/27/23 0830   Post-Procedure Depth (cm) 1.7 cm 07/20/23 0800   Post-Procedure Surface Area (cm^2) 6 cm^2 07/27/23 0830   Post-Procedure Volume (cm^3) 7.48 cm^3 07/20/23 0800   Wound Healing % 81 07/20/23 0800   Undermining (cm) 0 cm 07/27/23 0830   Wound Odor Mild;Foul 07/27/23 0830   Pulses Left;1+;DP;PT 07/20/23 0800   Number of days: 15       Wound 07/20/23 Full Thickness Wound Toe, Hallux Lateral Left (Active)   Wound Image    07/27/23 0830   Site Assessment Red;Yellow;Drainage;Other (Comment) 07/27/23 0830   Periwound Assessment Maceration;Edema;Other (Comment) 07/27/23 0830   Margins Unattached edges 07/27/23 0830   Closure Secondary intention 07/20/23 0800   Drainage Amount Large 07/27/23 0830   Drainage Description Tan;Serosanguineous 07/27/23 0830   Treatments Cleansed;Provider debridement;Site care 07/27/23 0830   Wound Cleansing Hypochlorus Acid 07/27/23 0830   Periwound Protectant Barrier Paste 07/27/23 0830   Dressing Status New drainage 07/20/23 0800   Dressing Changed Changed 07/27/23 0830   Dressing Cleansing/Solutions Not Applicable 07/27/23 0830   Dressing Options Silver Strip Packing;Dry Gauze;Super Absorbent Pad;Dry Roll Gauze;Hypafix Tape;Tubigrip 07/27/23 0830   Dressing Change/Treatment Frequency Every 48 hrs, and As Needed 07/27/23 0830   Wound Team Following Weekly 07/27/23 0830   Non-staged Wound Description Full thickness 07/27/23 0830   Wound Length (cm) 0.4 cm 07/27/23 0830   Wound Width (cm) 0.7 cm 07/27/23 0830   Wound Depth (cm) 1.8 cm 07/27/23 0830   Wound Surface Area (cm^2) 0.28 cm^2 07/27/23 0830   Wound Volume (cm^3) 0.504 cm^3 07/27/23 0830   Post-Procedure Length (cm) 0.4 cm 07/27/23 0830   Post-Procedure Width (cm) 0.4 cm 07/27/23 0830   Post-Procedure Depth (cm) 1.8 cm 07/27/23 0830   Post-Procedure Surface Area (cm^2) 0.16 cm^2 07/27/23 0830    Post-Procedure Volume (cm^3) 0.288 cm^3 07/27/23 0830   Wound Healing % -68 07/27/23 0830   Undermining (cm) 0 cm 07/27/23 0830   Wound Odor Mild;Foul 07/27/23 0830   Pulses 1+;Left;DP;PT 07/20/23 0800   Exposed Structures BOB 07/27/23 0830   Number of days: 7       PROCEDURE: Debridement first plantar MTH and left medial first MTH wounds.  -2% viscous lidocaine applied topically to wound bed for approximately 5 minutes prior to debridement  -Curette used to debride wound bed.  Excisional debridement was performed to remove devitalized tissue until healthy, bleeding tissue was visualized.   Entire surface of wound, 10.72 cm2 debrided.  Tissue debrided into the subcutaneous layer.    -Bleeding controlled with manual pressure.    -Wound care completed by wound RN, refer to flowsheet  -Patient tolerated the procedure well, without c/o pain or discomfort.       Pertinent Labs and Diagnostics:    Labs:     A1c:   Lab Results   Component Value Date/Time    HBA1C 5.8 (H) 07/13/2023 12:09 AM          IMAGING:   MRI 7/13/2023  IMPRESSION:     1.  Limited study due to extensive patient motion artifact and failed fat saturation.     2.  Some marrow edema and enhancement of the first metatarsal and the first proximal phalanx likely representing osteomyelitis.     3.  Extensive cellulitis involving the great toe with extension into the mid and forefoot most prominently medially. There are areas of soft tissue ulceration seen as well.    VASCULAR STUDIES: Arterial ultrasound left lower extremity dated 2/15/2023  IMPRESSION:     No sonographic evidence of hemodynamically significant stenosis within the left leg arterial system    LAST  WOUND CULTURE:  DATE :   Lab Results   Component Value Date/Time    CULTRSULT  07/14/2023 08:01 AM     No growth after 5 days of incubation.  Blood culture testing and Gram stain, if indicated, are  performed at [facility], [address] [place] [address]., [place], [place].   Positive blood cultures are  sent to [facility], [address] [place] [address], for organism identification and  susceptibility testing.      CULTRSULT  07/14/2023 08:01 AM     No growth after 5 days of incubation.  Blood culture testing and Gram stain, if indicated, are  performed at [facility], [address] [place] [address].  Positive blood cultures are  sent to [facility], [address] [place] [address], for organism identification and  susceptibility testing.           ASSESSMENT AND PLAN:   1. Open wound of left foot, subsequent encounter  -Excisional debridement of wound in clinic today, medically necessary to promote wound healing.   -Patient to return to clinic weekly for assessment and debridement as needed  -Patient is to change dressings as needed for saturation to the plantar and medial wounds.  Patient is to change the lateral hallux wound daily at minimum.  -Application for steven Vac placed in clinic today.  Is the best modality to decrease the depth of the wound bed and to help with the excessive drainage however obtaining a seal may be difficult due to the small opening to the medial aspect of the hallux.  -Patient has an appointment with ARJUN Webber ID  -Patient given the number to contact the [facility] and establish a follow-up appointment with the surgeon.   Wound care: Left lateral hallux Silver strip packing, dry gauze, superabsorbent pad, dry roll gauze, Hypafix tape, Tubigrip F.   Wound care to left medial and plantar hallux Hydrofiber silver, superabsorbent pad, dry roll gauze, Ifex tape, Tubigrip F    2. Other chronic osteomyelitis of left foot (HCC) /Wound infection  Patient has known osteomyelitis see MRI results above.  -Is currently on IV ertapenem followed by infectious disease  -Definitive treatment would be amputation if the wound does not progress  -Turbid fluid expressed from the left medial hallux wound.  Prognosis patient is at high risk for amputation.      PATIENT EDUCATION  - Importance of adequate nutrition for wound healing  -Advised to go to ER for any increased redness, swelling, drainage, or odor, or if patient develops fever, chills, nausea or vomiting.     >30 min spent face to face with patient, >50% of time spent counseling, coordinating care, reviewing records, discussing POC, educating patient         Please note that this note may have been created using voice recognition software. I have worked with technical experts from [facility] to optimize the interface.  I have made every reasonable attempt to correct obvious errors, but there may be errors of grammar and possibly content that I did not discover before finalizing the note.    N

## 2023-07-28 ENCOUNTER — OUTPATIENT INFUSION SERVICES (OUTPATIENT)
Dept: ONCOLOGY | Facility: MEDICAL CENTER | Age: 33
End: 2023-07-28
Attending: INTERNAL MEDICINE

## 2023-07-28 VITALS
HEART RATE: 82 BPM | OXYGEN SATURATION: 98 % | RESPIRATION RATE: 18 BRPM | TEMPERATURE: 98 F | SYSTOLIC BLOOD PRESSURE: 131 MMHG | DIASTOLIC BLOOD PRESSURE: 75 MMHG

## 2023-07-28 DIAGNOSIS — M86.172 ACUTE OSTEOMYELITIS OF LEFT FOOT (HCC): ICD-10-CM

## 2023-07-28 DIAGNOSIS — R78.81 BACTEREMIA: ICD-10-CM

## 2023-07-28 PROCEDURE — 700111 HCHG RX REV CODE 636 W/ 250 OVERRIDE (IP): Mod: JZ | Performed by: INTERNAL MEDICINE

## 2023-07-28 PROCEDURE — 700105 HCHG RX REV CODE 258: Performed by: INTERNAL MEDICINE

## 2023-07-28 PROCEDURE — 96365 THER/PROPH/DIAG IV INF INIT: CPT

## 2023-07-28 RX ORDER — 0.9 % SODIUM CHLORIDE 0.9 %
3 VIAL (ML) INJECTION PRN
Status: CANCELLED | OUTPATIENT
Start: 2023-07-29

## 2023-07-28 RX ORDER — 0.9 % SODIUM CHLORIDE 0.9 %
10 VIAL (ML) INJECTION PRN
Status: CANCELLED | OUTPATIENT
Start: 2023-07-29

## 2023-07-28 RX ORDER — 0.9 % SODIUM CHLORIDE 0.9 %
VIAL (ML) INJECTION PRN
Status: CANCELLED | OUTPATIENT
Start: 2023-07-29

## 2023-07-28 RX ADMIN — ERTAPENEM 1000 MG: 1 INJECTION, POWDER, LYOPHILIZED, FOR SOLUTION INTRAMUSCULAR; INTRAVENOUS at 17:50

## 2023-07-28 NOTE — PROGRESS NOTES
Mr Molina is here today for daily invanz antibiotic infusion. He has no new concerns to report.  Surgical wound to left foot was cared for today by wound care.    PICC line to his left upper arm was flushed, slightly positional today, had blood return.     Invanz was administered per MAR and was tolerated well.    PICC line was flushed, sleeve replaced, Mr Molina will return tomorrow.

## 2023-07-29 ENCOUNTER — OUTPATIENT INFUSION SERVICES (OUTPATIENT)
Dept: ONCOLOGY | Facility: MEDICAL CENTER | Age: 33
End: 2023-07-29
Attending: INTERNAL MEDICINE

## 2023-07-29 VITALS
SYSTOLIC BLOOD PRESSURE: 128 MMHG | OXYGEN SATURATION: 96 % | RESPIRATION RATE: 18 BRPM | HEART RATE: 92 BPM | DIASTOLIC BLOOD PRESSURE: 71 MMHG | TEMPERATURE: 98.8 F

## 2023-07-29 DIAGNOSIS — M86.172 ACUTE OSTEOMYELITIS OF LEFT FOOT (HCC): ICD-10-CM

## 2023-07-29 DIAGNOSIS — R78.81 BACTEREMIA: ICD-10-CM

## 2023-07-29 PROCEDURE — 700105 HCHG RX REV CODE 258: Performed by: INTERNAL MEDICINE

## 2023-07-29 PROCEDURE — 700111 HCHG RX REV CODE 636 W/ 250 OVERRIDE (IP): Mod: JZ | Performed by: INTERNAL MEDICINE

## 2023-07-29 PROCEDURE — 96365 THER/PROPH/DIAG IV INF INIT: CPT

## 2023-07-29 RX ORDER — 0.9 % SODIUM CHLORIDE 0.9 %
10 VIAL (ML) INJECTION PRN
Status: CANCELLED | OUTPATIENT
Start: 2023-07-30

## 2023-07-29 RX ORDER — 0.9 % SODIUM CHLORIDE 0.9 %
VIAL (ML) INJECTION PRN
Status: CANCELLED | OUTPATIENT
Start: 2023-07-30

## 2023-07-29 RX ORDER — 0.9 % SODIUM CHLORIDE 0.9 %
3 VIAL (ML) INJECTION PRN
Status: CANCELLED | OUTPATIENT
Start: 2023-07-30

## 2023-07-29 RX ADMIN — ERTAPENEM 1000 MG: 1 INJECTION, POWDER, LYOPHILIZED, FOR SOLUTION INTRAMUSCULAR; INTRAVENOUS at 16:41

## 2023-07-29 NOTE — DOCUMENTATION QUERY
[facility]                                                                       Query Response Note      PATIENT:               CLAUDIA MILIAN  ACCT #:                  [id number]  MRN:                     [id number]  :                      1990  ADMIT DATE:       2023 3:43 PM  DISCH DATE:        2023 10:10 AM  RESPONDING  PROVIDER #:        [id number]           QUERY TEXT:    Pt has documented necrotizing fasciitis noted as ?suspected? or similar terminology such as ruled out, probable, etc.  Please clarify status of this condition:    NOTE:  If an appropriate response is not listed below, please respond with a new note.       The patient's Clinical Indicators include:  CLINICAL INDICATORS  PER H&P:  The patient was evaluated imaging studies which shows an abscess formation with early necrotizing fasciitis  with gas formation.     MRI LEFT FOOT (pertinent results):  2.Some marrow edema and enhancement of the first metatarsal and the first proximal phalanx likely representing osteomyelitis.    3.Extensive cellulitis involving the great toe with extension into the mid and forefoot most prominently medially.There are areas of soft tissue ulceration seen as well    TREATMENT   OP NOTE:  Procedure: Irrigation and debridement  with drainage of abscess from plantar and dorsal aspects of the left foot  Findings: There was a full-thickness ulcer all the way to  bone on the plantar aspect under the first metatarsal head.  There is also a large abscess involving this area extending around to the dorsal aspect of the foot extending across the nearly entire medial lateral extent of the forefoot    --IV Unasyn 3 g every 6 hours while inpatient; Continue 6 weeks of Ertapenem upon discharge    RISK FACTORS  --patient declined left 1st ray amputation  --Bacteremia-Blood cultures from 23 were positive for strep intermedius, and foot  cultures grew VIDAL, strep intermedius,  Corynebacterium, pasteurella, bacteroides, prevotella and enterococcus. Repeat blood cultures taken on 7/14 were negative  --DM with hyperglycemia; alcoholic cirrhosis with ascites;   -- Metabolic acidosis     CONTACT  Chasidy Purcell CCS  Senior   [email or web address]  Options provided:   -- necrotizing fasciitis is ruled in   -- necrotizing fasciitis is ruled out   -- Unable to determine      Query created by: Chasidy Purcell on 7/22/2023 9:48 AM    RESPONSE TEXT:    necrotizing fasciitis is ruled out       QUERY TEXT:    Pt has documented sepsis noted as ?possible? or similar terminology such as suspected, probable, etc.  Please clarify status of this condition:    NOTE:  If an appropriate response is not listed below, please respond with a new note.       The patient's Clinical Indicators include:  CLINICAL INDICATORS  PER H&P:  Temp:  [39.1 °C (102.3 °F)-39.7 °C (103.4 °F)] 39.7 °C (103.4 °F)  Pulse:  [] 85  Resp:  [16-20] 20  BP: (134-160)/(62-85) 134/65  SpO2:  [97 %-100 %] 97 %    Recent Labs :    07/13/23  0009 07/14/23  0346 07/15/23  0144  GLUCOSE 363* 151* 162*  Recent Labs    07/12/23 1557 07/13/23 0009 07/14/23 0346  WBC 10.8                      9.3                        7.0    Bacteremia,  secondary to foot infection  -Blood cultures on 7/12 + possible Streptococcus intermedius (confirmed Gram stain with microbiology department)     7/12: ORTHO PN:  He was febrile on admission with  increased inflammatory markers and lactic acid.    TREATMENT  7/12 OP NOTE:  Procedure: Irrigation and debridement  with drainage of abscess from plantar and dorsal aspects of the left foot  Findings: There was a full-thickness ulcer all the way to  bone on the plantar aspect under the first metatarsal head.  There is also a large abscess involving this area extending around to the dorsal aspect of the foot extending across the nearly entire medial lateral  extent of the forefoot    --IV Unasyn 3 g every 6 hours while inpatient; Continue 6 weeks of Ertapenem upon discharge    RISK FACTORS  --Bacteremia-Blood cultures from 7/12/23 were positive for strep intermedius, and foot cultures grew VIDAL, strep intermedius,  Corynebacterium, pasteurella, bacteroides, prevotella and enterococcus. Repeat blood cultures taken on 7/14 were negative  --DM with hyperglycemia; alcoholic cirrhosis with ascites;   -- Metabolic acidosis & hyponatremia     CONTACT  Chasidy Purcell CCS  Senior   [email or web address]  Options provided:   -- Sepsis is ruled in   -- Sepsis is ruled out   -- Unable to determine      Query created by: Chasidy Purcell on 7/22/2023 9:59 AM    RESPONSE TEXT:    Sepsis is ruled in          Electronically signed by:  ZULEIKA PEREZ DO 7/29/2023 6:40 AM

## 2023-07-29 NOTE — PROGRESS NOTES
returns for IVABX for Acute osteomyelitis of left foot . Invanz infused as ordered. Esequiel tolerated well and without incident. PICC line in good condition and has scant blood return. Probably will need aleplase, patient aware, appointment moved earlier for tomorrow. PICC line flushed with saline per protocol. Esequiel DC'd home in good condition and in NAD. Returns daily. Appointment confirm for next treatment.

## 2023-07-30 ENCOUNTER — OUTPATIENT INFUSION SERVICES (OUTPATIENT)
Dept: ONCOLOGY | Facility: MEDICAL CENTER | Age: 33
End: 2023-07-30
Attending: INTERNAL MEDICINE

## 2023-07-30 VITALS
WEIGHT: 208.11 LBS | TEMPERATURE: 97.5 F | HEART RATE: 94 BPM | OXYGEN SATURATION: 98 % | SYSTOLIC BLOOD PRESSURE: 127 MMHG | RESPIRATION RATE: 18 BRPM | DIASTOLIC BLOOD PRESSURE: 66 MMHG | BODY MASS INDEX: 24.67 KG/M2

## 2023-07-30 DIAGNOSIS — M86.172 ACUTE OSTEOMYELITIS OF LEFT FOOT (HCC): ICD-10-CM

## 2023-07-30 DIAGNOSIS — R78.81 BACTEREMIA: ICD-10-CM

## 2023-07-30 PROCEDURE — 700105 HCHG RX REV CODE 258: Performed by: INTERNAL MEDICINE

## 2023-07-30 PROCEDURE — 96365 THER/PROPH/DIAG IV INF INIT: CPT

## 2023-07-30 PROCEDURE — 700111 HCHG RX REV CODE 636 W/ 250 OVERRIDE (IP): Mod: JZ | Performed by: INTERNAL MEDICINE

## 2023-07-30 RX ORDER — 0.9 % SODIUM CHLORIDE 0.9 %
VIAL (ML) INJECTION PRN
Status: CANCELLED | OUTPATIENT
Start: 2023-07-31

## 2023-07-30 RX ORDER — 0.9 % SODIUM CHLORIDE 0.9 %
10 VIAL (ML) INJECTION PRN
Status: CANCELLED | OUTPATIENT
Start: 2023-07-31

## 2023-07-30 RX ORDER — 0.9 % SODIUM CHLORIDE 0.9 %
3 VIAL (ML) INJECTION PRN
Status: CANCELLED | OUTPATIENT
Start: 2023-07-31

## 2023-07-30 RX ADMIN — ERTAPENEM 1000 MG: 1 INJECTION, POWDER, LYOPHILIZED, FOR SOLUTION INTRAMUSCULAR; INTRAVENOUS at 17:40

## 2023-07-30 ASSESSMENT — FIBROSIS 4 INDEX: FIB4 SCORE: 1.01

## 2023-07-30 NOTE — PROGRESS NOTES
Esequiel returns to [facility] for daily Ertapenem infusion.  He denies any new or acute changes.  LUE PICC flushes easily with brisk blood return observed.  Ertapenem infused as ordered, Esequiel tolerated well.  PICC flushed with NS, wrapped in gauze and protective sleeve.  Discharged in NAD, next appointment confirmed.

## 2023-07-31 ENCOUNTER — HOSPITAL ENCOUNTER (OUTPATIENT)
Dept: RADIOLOGY | Facility: MEDICAL CENTER | Age: 33
End: 2023-07-31
Attending: INTERNAL MEDICINE

## 2023-07-31 ENCOUNTER — OUTPATIENT INFUSION SERVICES (OUTPATIENT)
Dept: ONCOLOGY | Facility: MEDICAL CENTER | Age: 33
End: 2023-07-31
Attending: INTERNAL MEDICINE

## 2023-07-31 VITALS
RESPIRATION RATE: 18 BRPM | HEART RATE: 84 BPM | OXYGEN SATURATION: 96 % | SYSTOLIC BLOOD PRESSURE: 113 MMHG | TEMPERATURE: 98.1 F | DIASTOLIC BLOOD PRESSURE: 69 MMHG

## 2023-07-31 DIAGNOSIS — M86.172 ACUTE OSTEOMYELITIS OF LEFT FOOT (HCC): ICD-10-CM

## 2023-07-31 DIAGNOSIS — R78.81 BACTEREMIA: ICD-10-CM

## 2023-07-31 LAB
ALBUMIN SERPL BCP-MCNC: 4.2 G/DL (ref 3.2–4.9)
ALBUMIN/GLOB SERPL: 1.6 G/DL
ALP SERPL-CCNC: 202 U/L (ref 30–99)
ALT SERPL-CCNC: 39 U/L (ref 2–50)
ANION GAP SERPL CALC-SCNC: 11 MMOL/L (ref 7–16)
AST SERPL-CCNC: 27 U/L (ref 12–45)
BASOPHILS # BLD AUTO: 1 % (ref 0–1.8)
BASOPHILS # BLD: 0.05 K/UL (ref 0–0.12)
BILIRUB SERPL-MCNC: 0.4 MG/DL (ref 0.1–1.5)
BUN SERPL-MCNC: 16 MG/DL (ref 8–22)
CALCIUM ALBUM COR SERPL-MCNC: 9.5 MG/DL (ref 8.5–10.5)
CALCIUM SERPL-MCNC: 9.7 MG/DL (ref 8.5–10.5)
CHLORIDE SERPL-SCNC: 103 MMOL/L (ref 96–112)
CO2 SERPL-SCNC: 24 MMOL/L (ref 20–33)
CREAT SERPL-MCNC: 0.72 MG/DL (ref 0.5–1.4)
EOSINOPHIL # BLD AUTO: 0.15 K/UL (ref 0–0.51)
EOSINOPHIL NFR BLD: 2.9 % (ref 0–6.9)
ERYTHROCYTE [DISTWIDTH] IN BLOOD BY AUTOMATED COUNT: 45 FL (ref 35.9–50)
GFR SERPLBLD CREATININE-BSD FMLA CKD-EPI: 124 ML/MIN/1.73 M 2
GLOBULIN SER CALC-MCNC: 2.7 G/DL (ref 1.9–3.5)
GLUCOSE SERPL-MCNC: 101 MG/DL (ref 65–99)
HCT VFR BLD AUTO: 36.6 % (ref 42–52)
HGB BLD-MCNC: 11.6 G/DL (ref 14–18)
IMM GRANULOCYTES # BLD AUTO: 0.03 K/UL (ref 0–0.11)
IMM GRANULOCYTES NFR BLD AUTO: 0.6 % (ref 0–0.9)
LYMPHOCYTES # BLD AUTO: 1.11 K/UL (ref 1–4.8)
LYMPHOCYTES NFR BLD: 21.6 % (ref 22–41)
MCH RBC QN AUTO: 28.4 PG (ref 27–33)
MCHC RBC AUTO-ENTMCNC: 31.7 G/DL (ref 32.3–36.5)
MCV RBC AUTO: 89.5 FL (ref 81.4–97.8)
MONOCYTES # BLD AUTO: 0.43 K/UL (ref 0–0.85)
MONOCYTES NFR BLD AUTO: 8.4 % (ref 0–13.4)
NEUTROPHILS # BLD AUTO: 3.36 K/UL (ref 1.82–7.42)
NEUTROPHILS NFR BLD: 65.5 % (ref 44–72)
NRBC # BLD AUTO: 0 K/UL
NRBC BLD-RTO: 0 /100 WBC (ref 0–0.2)
OUTPT INFUS CBC COMMENT OICOM: ABNORMAL
PLATELET # BLD AUTO: 95 K/UL (ref 164–446)
PLATELETS.RETICULATED NFR BLD AUTO: 13.7 % (ref 0.6–13.1)
PMV BLD AUTO: 12.7 FL (ref 9–12.9)
POTASSIUM SERPL-SCNC: 4 MMOL/L (ref 3.6–5.5)
PROT SERPL-MCNC: 6.9 G/DL (ref 6–8.2)
RBC # BLD AUTO: 4.09 M/UL (ref 4.7–6.1)
SODIUM SERPL-SCNC: 138 MMOL/L (ref 135–145)
WBC # BLD AUTO: 5.1 K/UL (ref 4.8–10.8)

## 2023-07-31 PROCEDURE — 36573 INSJ PICC RS&I 5 YR+: CPT

## 2023-07-31 PROCEDURE — 85025 COMPLETE CBC W/AUTO DIFF WBC: CPT

## 2023-07-31 PROCEDURE — 700111 HCHG RX REV CODE 636 W/ 250 OVERRIDE (IP): Mod: JZ | Performed by: INTERNAL MEDICINE

## 2023-07-31 PROCEDURE — 96365 THER/PROPH/DIAG IV INF INIT: CPT

## 2023-07-31 PROCEDURE — 80053 COMPREHEN METABOLIC PANEL: CPT

## 2023-07-31 PROCEDURE — 700105 HCHG RX REV CODE 258: Performed by: INTERNAL MEDICINE

## 2023-07-31 PROCEDURE — 85055 RETICULATED PLATELET ASSAY: CPT

## 2023-07-31 RX ORDER — 0.9 % SODIUM CHLORIDE 0.9 %
3 VIAL (ML) INJECTION PRN
Status: CANCELLED | OUTPATIENT
Start: 2023-08-01

## 2023-07-31 RX ORDER — 0.9 % SODIUM CHLORIDE 0.9 %
10 VIAL (ML) INJECTION PRN
Status: CANCELLED | OUTPATIENT
Start: 2023-08-01

## 2023-07-31 RX ORDER — 0.9 % SODIUM CHLORIDE 0.9 %
VIAL (ML) INJECTION PRN
Status: CANCELLED | OUTPATIENT
Start: 2023-08-01

## 2023-07-31 RX ADMIN — ERTAPENEM 1000 MG: 1 INJECTION, POWDER, LYOPHILIZED, FOR SOLUTION INTRAMUSCULAR; INTRAVENOUS at 15:26

## 2023-07-31 NOTE — PROCEDURES
Vascular Access Team     Date of Insertion: 7/31/2023  Arm Circumference: 29.5  Internal length: 46  External Length: 1  Vein Occupancy %: 33  Reason for PICC: IV ABX  Labs: LABS WITHIN PARAMETERS    Consents confirmed, vessel patency confirmed with ultrasound. Risks and benefits of procedure explained to patient and education regarding central line associated bloodstream infections provided. Questions answered.     PICC placed via exchange procedure in LUE per MD order with ultrasound guidance, initial arm circumference 46cm. 4 Fr, 1 lumen PICC placed in BASILIC vein after 1 attempt(s). 2 cc's of 1% lidocaine injected intradermally. Guidewire inserted through previously placed line which was then removed in a sterile manner.  Dilator sheath inserted over guidewire, which was then removed before placing a new line. 46 cm catheter inserted with good blood return. Secured at 1 cm marker. Each lumen flushed without resistance with 10 mL 0.9% normal saline. PICC line secured with Biopatch and Tegaderm.     PICC tip placement location is confirmed by nurse to be in the Superior Vena Cava (SVC) utilizing 3CG technology. PICC line is appropriate for use at this time. Patient tolerated procedure well, without complications.  Patient condition relayed to unit RN or ordering physician via this post procedure note in the EMR    Ultrasound images uploaded to PACS and viewable in the EMR - yes  Ultrasound imaged printed and placed in paper chart - no     BARD Power PICC ref # [id number], Lot # [id number], Expiration Date 2024-06-30    PICC noted to be at 9cm out from original 3. At approximately 7.5cm luis e line looked like it had almost been folded or pinched in half. Line was able to flush easily with brisk blood return. No s/s infection noted at insertion site. Decision made for exchange of PICC. Pt agreeable with this decision.     Per pt request to assist with his comfort, pigtail of PICC was directed medially toward tricep and  secured with appropriate dressing.

## 2023-07-31 NOTE — PROGRESS NOTES
Esequiel to infusion for daily Invanz infusions. Reports feeling well today with no major complaints. PICC line flushed, no blood return obtained and sluggish to flush depending on position of patient's arm, was able to find a position where PICC could easily be flushed. No redness or swelling around PICC site, patient denies pain at insertion site, chest or shoulder. Ertapenem infused as ordered and dressing change done using sterile technique and clave changed to see if changing position of PICC line would help with flow, PICC line still very positional with flush after dressing change but was able to obtain blood return, suspect that line may be kinked internally as tension needs to be held on skin surrounding PICC line in order to flush and obtain blood. PICC flushed post infusion. Message sent to infectious disease to request orders be placed for placement of new PICC. Patient left in stable condition, knows when to return for appt tomorrow.

## 2023-08-01 ENCOUNTER — OUTPATIENT INFUSION SERVICES (OUTPATIENT)
Dept: ONCOLOGY | Facility: MEDICAL CENTER | Age: 33
End: 2023-08-01
Attending: INTERNAL MEDICINE

## 2023-08-01 VITALS
SYSTOLIC BLOOD PRESSURE: 126 MMHG | HEART RATE: 84 BPM | RESPIRATION RATE: 18 BRPM | BODY MASS INDEX: 24.67 KG/M2 | TEMPERATURE: 97.3 F | DIASTOLIC BLOOD PRESSURE: 69 MMHG | OXYGEN SATURATION: 97 % | WEIGHT: 208.11 LBS

## 2023-08-01 DIAGNOSIS — R78.81 BACTEREMIA: ICD-10-CM

## 2023-08-01 DIAGNOSIS — M86.172 ACUTE OSTEOMYELITIS OF LEFT FOOT (HCC): ICD-10-CM

## 2023-08-01 PROCEDURE — 700111 HCHG RX REV CODE 636 W/ 250 OVERRIDE (IP): Mod: JZ | Performed by: INTERNAL MEDICINE

## 2023-08-01 PROCEDURE — 96365 THER/PROPH/DIAG IV INF INIT: CPT

## 2023-08-01 PROCEDURE — 700105 HCHG RX REV CODE 258: Performed by: INTERNAL MEDICINE

## 2023-08-01 PROCEDURE — 96374 THER/PROPH/DIAG INJ IV PUSH: CPT

## 2023-08-01 RX ORDER — 0.9 % SODIUM CHLORIDE 0.9 %
10 VIAL (ML) INJECTION PRN
Status: CANCELLED | OUTPATIENT
Start: 2023-08-02

## 2023-08-01 RX ORDER — 0.9 % SODIUM CHLORIDE 0.9 %
VIAL (ML) INJECTION PRN
Status: CANCELLED | OUTPATIENT
Start: 2023-08-02

## 2023-08-01 RX ORDER — 0.9 % SODIUM CHLORIDE 0.9 %
3 VIAL (ML) INJECTION PRN
Status: CANCELLED | OUTPATIENT
Start: 2023-08-02

## 2023-08-01 RX ADMIN — ERTAPENEM 1000 MG: 1 INJECTION, POWDER, LYOPHILIZED, FOR SOLUTION INTRAMUSCULAR; INTRAVENOUS at 18:15

## 2023-08-01 ASSESSMENT — FIBROSIS 4 INDEX: FIB4 SCORE: 1.5

## 2023-08-01 NOTE — PROGRESS NOTES
Esequiel to infusion for daily Invanz infusion, just came from [facility] where PICC line was replaced. PICC to L upper arm with dressing CDI, flushed with NS with positive blood and labs drawn. Invanz infused over 30 minutes, patient tolerated well with no adverse effects. PICC flushed post infusion with positive blood. Patient left in stable condition, knows when to return for appt tomorrow. Labs reviewed by RN.

## 2023-08-02 ENCOUNTER — OUTPATIENT INFUSION SERVICES (OUTPATIENT)
Dept: ONCOLOGY | Facility: MEDICAL CENTER | Age: 33
End: 2023-08-02
Attending: INTERNAL MEDICINE

## 2023-08-02 ENCOUNTER — OFFICE VISIT (OUTPATIENT)
Dept: INFECTIOUS DISEASES | Facility: MEDICAL CENTER | Age: 33
End: 2023-08-02
Attending: NURSE PRACTITIONER

## 2023-08-02 VITALS
OXYGEN SATURATION: 96 % | HEART RATE: 97 BPM | SYSTOLIC BLOOD PRESSURE: 118 MMHG | TEMPERATURE: 96.2 F | DIASTOLIC BLOOD PRESSURE: 70 MMHG | BODY MASS INDEX: 24.56 KG/M2 | WEIGHT: 208 LBS | RESPIRATION RATE: 16 BRPM | HEIGHT: 77 IN

## 2023-08-02 VITALS
DIASTOLIC BLOOD PRESSURE: 67 MMHG | HEART RATE: 86 BPM | RESPIRATION RATE: 18 BRPM | TEMPERATURE: 98 F | SYSTOLIC BLOOD PRESSURE: 112 MMHG | OXYGEN SATURATION: 95 %

## 2023-08-02 DIAGNOSIS — G57.93 NEUROPATHY OF BOTH FEET: ICD-10-CM

## 2023-08-02 DIAGNOSIS — M86.172 ACUTE OSTEOMYELITIS OF LEFT FOOT (HCC): ICD-10-CM

## 2023-08-02 DIAGNOSIS — D69.6 THROMBOCYTOPENIA (HCC): ICD-10-CM

## 2023-08-02 DIAGNOSIS — R78.81 BACTEREMIA: ICD-10-CM

## 2023-08-02 DIAGNOSIS — L97.509 CONTROLLED TYPE 2 DIABETES MELLITUS WITH FOOT ULCER, WITHOUT LONG-TERM CURRENT USE OF INSULIN (HCC): ICD-10-CM

## 2023-08-02 DIAGNOSIS — A49.9 POLYMICROBIAL BACTERIAL INFECTION: ICD-10-CM

## 2023-08-02 DIAGNOSIS — L02.612 ABSCESS OF LEFT FOOT: ICD-10-CM

## 2023-08-02 DIAGNOSIS — E11.621 CONTROLLED TYPE 2 DIABETES MELLITUS WITH FOOT ULCER, WITHOUT LONG-TERM CURRENT USE OF INSULIN (HCC): ICD-10-CM

## 2023-08-02 DIAGNOSIS — M86.272 SUBACUTE OSTEOMYELITIS OF LEFT FOOT (HCC): ICD-10-CM

## 2023-08-02 PROCEDURE — 700105 HCHG RX REV CODE 258: Performed by: INTERNAL MEDICINE

## 2023-08-02 PROCEDURE — 99214 OFFICE O/P EST MOD 30 MIN: CPT | Performed by: NURSE PRACTITIONER

## 2023-08-02 PROCEDURE — 700111 HCHG RX REV CODE 636 W/ 250 OVERRIDE (IP): Mod: JZ | Performed by: INTERNAL MEDICINE

## 2023-08-02 PROCEDURE — 96365 THER/PROPH/DIAG IV INF INIT: CPT

## 2023-08-02 PROCEDURE — 3074F SYST BP LT 130 MM HG: CPT | Performed by: NURSE PRACTITIONER

## 2023-08-02 PROCEDURE — 3078F DIAST BP <80 MM HG: CPT | Performed by: NURSE PRACTITIONER

## 2023-08-02 PROCEDURE — 99211 OFF/OP EST MAY X REQ PHY/QHP: CPT | Performed by: NURSE PRACTITIONER

## 2023-08-02 RX ORDER — 0.9 % SODIUM CHLORIDE 0.9 %
10 VIAL (ML) INJECTION PRN
Status: CANCELLED | OUTPATIENT
Start: 2023-08-03

## 2023-08-02 RX ORDER — 0.9 % SODIUM CHLORIDE 0.9 %
3 VIAL (ML) INJECTION PRN
Status: CANCELLED | OUTPATIENT
Start: 2023-08-03

## 2023-08-02 RX ORDER — 0.9 % SODIUM CHLORIDE 0.9 %
VIAL (ML) INJECTION PRN
Status: CANCELLED | OUTPATIENT
Start: 2023-08-03

## 2023-08-02 RX ADMIN — ERTAPENEM 1000 MG: 1 INJECTION, POWDER, LYOPHILIZED, FOR SOLUTION INTRAMUSCULAR; INTRAVENOUS at 14:06

## 2023-08-02 ASSESSMENT — ENCOUNTER SYMPTOMS
WHEEZING: 0
BRUISES/BLEEDS EASILY: 0
FEVER: 0
ABDOMINAL PAIN: 0
FOCAL WEAKNESS: 0
DIZZINESS: 0
MYALGIAS: 0
BLURRED VISION: 0
SPUTUM PRODUCTION: 0
WEIGHT LOSS: 0
VOMITING: 0
DOUBLE VISION: 0
CHILLS: 0
PALPITATIONS: 0
CONSTIPATION: 0
NERVOUS/ANXIOUS: 0
NAUSEA: 0
COUGH: 0
SHORTNESS OF BREATH: 0
HEADACHES: 0
DIARRHEA: 0

## 2023-08-02 ASSESSMENT — FIBROSIS 4 INDEX: FIB4 SCORE: 1.5

## 2023-08-02 NOTE — PROGRESS NOTES
INFECTIOUS  DISEASE  OUTPATIENT CLINIC  NOTE   Subjective   Primary care provider: Juvenal Lewis P.A.-C..     Reason for Follow Up:   Follow-up for   1. Subacute osteomyelitis of left foot (HCC)        2. Abscess of left foot        3. Bacteremia        4. Polymicrobial bacterial infection        5. Neuropathy of both feet        6. Controlled type 2 diabetes mellitus with foot ulcer, without long-term current use of insulin (HCC)  Referral to Podiatry      7. Thrombocytopenia (HCC)            HPI: Patient previously seen and treated by ID team as inpatient during hospital admission.   Esequiel Molina is a 33 y.o.  admitted 7/12/2023. Pt has a past medical history of DMT2, alcoholic cirrhosis and sober x2 years per notes, admitted with left foot pain, erythema and drainage.  MRI left foot with limited study due to motion artifact, some marrow enhancement the first metatarsal and first proximal likely representing osteomyelitis, extensive cellulitis of the great toe with extension into the midfoot.  Orthopedic surgery were consulted and the patient went to the OR.  Iron and D on 7/12/2023 with drainage of abscess from plantar and dorsal aspect of left foot. Per the procedure note, there was significant purulence noted and the wound bed probed all the way down to the lateral aspect of the dorsal forefoot.  OR cultures polymicrobial bacteria.  Streptococcal intermedius, MSSA, corynebacterium (Penicillin resistance, Bactrim resistance, doxycycline intermediate resistance), , enterococcal faecalis (pansensitivity), Pasturella canis oralis, anaerobic's Bacteroides ovatus group and Prevotella disiens.  Blood cultures on 7/12 + Streptococcus intermedius. BXc negative 7/14/23. Orthopedics has recommended further I&D's and likely first ray amputation but patient has refused.  Patient was discharged home with a 6-week course of IV ertapenem 1 g every 24 hours with a stop date of 8/25/2023.      08/02/23- Today Patient  reports feeling well. Pt stating that the wound is healing well. Pt being followed by the [facility]ound clinic. Wound pictures reviewed in EPIC. Denies drainage, pungent odor, redness, pain. Denies feeling generally ill, fevers/chills, general malaise, headache, n/v/d.  Tolerating IV ertapenem with no complaints of side effects.  Pending follow-up with orthopedic surgery.  Referral to podiatrist placed as patient has had recurrent foot issues in the past    Current Antimicrobials: 6-week course of IV ertapenem 1 g every 24 hours with a stop date of 8/25/2023.  Previous Antimicrobials: Daptomycin, Unasyn    Other Current Medications:  Home Medications    Medication Sig Taking? Last Dose Authorizing Provider   metFORMIN (GLUCOPHAGE) 500 MG Tab Take 1 Tablet by mouth 2 times a day with meals. Yes Taking Judy Rapp M.D.   NS SOLN 100 mL with ertapenem 1 GM SOLR 1,000 mg Infuse 1,000 mg into a venous catheter every 24 hours. Yes Taking Judy Rapp M.D.   Blood Glucose Test Strips Use one  strip to test blood sugar three times daily before meals. Yes Taking Judy Rapp M.D.   Lancets Use one  lancet to test blood sugar three times daily before meals. Yes Taking Judy Rapp M.D.   Alcohol Swabs Wipe site with prep pad prior to injection. Yes Taking Judy Rapp M.D.   Blood Glucose Meter Kit Test blood sugar as recommended by provider. 1 blood glucose monitoring kit. Yes Taking Judy Rapp M.D.        PMH:  Past Medical History:   Diagnosis Date    Acute osteomyelitis of left foot (HCC) 7/13/2023    Anxiety     Depression     Diabetes (HCC)     GERD (gastroesophageal reflux disease)     History of chickenpox     Indigestion     Jaundice     Liver disease     Urinary incontinence     bowel incontnence      Past Surgical History:   Procedure Laterality Date    INCISION AND DRAINAGE ORTHOPEDIC Left 7/12/2023    Procedure: INCISION AND DRAINAGE foot;  Surgeon: Michael Ponce M.D.;  Location: SURGERY [facility]  [facility];  Service: Orthopedics     History reviewed. No pertinent family history.  Social History     Socioeconomic History    Marital status: Single     Spouse name: Not on file    Number of children: Not on file    Years of education: Not on file    Highest education level: Not on file   Occupational History    Not on file   Tobacco Use    Smoking status: Former     Types: Cigarettes    Smokeless tobacco: Current     Types: Chew   Vaping Use    Vaping Use: Former    Start date: 1/11/2017    Quit date: 1/11/2020    Substances: Nicotine, THC    Devices: Pre-filled pod   Substance and Sexual Activity    Alcohol use: Not Currently    Drug use: Yes     Types: Marijuana, Inhaled, Oral     Comment: marijuana    Sexual activity: Not Currently   Other Topics Concern    Not on file   Social History Narrative    Not on file     Social Determinants of Health     Financial Resource Strain: Not on file   Food Insecurity: Not on file   Transportation Needs: Not on file   Physical Activity: Not on file   Stress: Not on file   Social Connections: Not on file   Intimate Partner Violence: Not on file   Housing Stability: Not on file           Allergies/Intolerances:  No Known Allergies    ROS:   Review of Systems   Constitutional:  Negative for chills, fever, malaise/fatigue and weight loss.   HENT:  Negative for congestion and hearing loss.    Eyes:  Negative for blurred vision and double vision.   Respiratory:  Negative for cough, sputum production, shortness of breath and wheezing.    Cardiovascular:  Negative for chest pain and palpitations.   Gastrointestinal:  Negative for abdominal pain, constipation, diarrhea, nausea and vomiting.   Genitourinary:  Negative for dysuria.   Musculoskeletal:  Positive for joint pain (Left foot). Negative for myalgias.   Skin:  Negative for itching and rash.        Decreasing redness of left foot   Neurological:  Negative for dizziness, focal weakness and headaches.   Endo/Heme/Allergies:  Does  "not bruise/bleed easily.   Psychiatric/Behavioral:  The patient is not nervous/anxious.       ROS was reviewed and were negative except as above.    Objective    Most Recent Vital Signs:  Blood Pressure 118/70 (BP Location: Left arm, Patient Position: Sitting, BP Cuff Size: Adult)   Pulse 97   Temperature (Abnormal) 35.7 °C (96.2 °F) (Temporal)   Respiration 16   Height 1.956 m (6' 5\")   Weight 94.3 kg (208 lb)   Oxygen Saturation 96%   Body Mass Index 24.67 kg/m²     Physical Exam:  Physical Exam  Vitals reviewed.   Constitutional:       Appearance: Normal appearance.   HENT:      Head: Normocephalic and atraumatic.      Nose: Nose normal.      Mouth/Throat:      Mouth: Mucous membranes are moist.   Eyes:      Pupils: Pupils are equal, round, and reactive to light.   Cardiovascular:      Rate and Rhythm: Normal rate.   Pulmonary:      Effort: Pulmonary effort is normal.   Abdominal:      Palpations: Abdomen is soft.   Musculoskeletal:         General: No tenderness.      Cervical back: Normal range of motion and neck supple.      Comments: Left foot with dressing  See clinical photos attached below  X3 wound.  No surrounding erythema.  Mild swelling  Clear serous drainage on wound dressings   Skin:     General: Skin is warm and dry.      Coloration: Skin is not jaundiced.      Findings: No erythema or rash.   Neurological:      Mental Status: He is alert and oriented to person, place, and time.      Motor: No weakness.   Psychiatric:         Mood and Affect: Mood normal.         Behavior: Behavior normal.          Pertinent Lab/Imaging Results:  [unfilled]  @CMP@  WBC   Date/Time Value Ref Range Status   07/31/2023 03:19 PM 5.1 4.8 - 10.8 K/uL Final     RBC   Date/Time Value Ref Range Status   07/31/2023 03:19 PM 4.09 (L) 4.70 - 6.10 M/uL Final     Hemoglobin   Date/Time Value Ref Range Status   07/31/2023 03:19 PM 11.6 (L) 14.0 - 18.0 g/dL Final     Hematocrit   Date/Time Value Ref Range Status   07/31/2023 " 03:19 PM 36.6 (L) 42.0 - 52.0 % Final     MCV   Date/Time Value Ref Range Status   07/31/2023 03:19 PM 89.5 81.4 - 97.8 fL Final     MCH   Date/Time Value Ref Range Status   07/31/2023 03:19 PM 28.4 27.0 - 33.0 pg Final     MCHC   Date/Time Value Ref Range Status   07/31/2023 03:19 PM 31.7 (L) 32.3 - 36.5 g/dL Final     Comment:     Please note new reference range effective 05/22/2023.     MPV   Date/Time Value Ref Range Status   07/31/2023 03:19 PM 12.7 9.0 - 12.9 fL Final      Sodium   Date/Time Value Ref Range Status   07/31/2023 03:19  135 - 145 mmol/L Final     Potassium   Date/Time Value Ref Range Status   07/31/2023 03:19 PM 4.0 3.6 - 5.5 mmol/L Final     Chloride   Date/Time Value Ref Range Status   07/31/2023 03:19  96 - 112 mmol/L Final     Co2   Date/Time Value Ref Range Status   07/31/2023 03:19 PM 24 20 - 33 mmol/L Final     Glucose   Date/Time Value Ref Range Status   07/31/2023 03:19  (H) 65 - 99 mg/dL Final     Bun   Date/Time Value Ref Range Status   07/31/2023 03:19 PM 16 8 - 22 mg/dL Final     Creatinine   Date/Time Value Ref Range Status   07/31/2023 03:19 PM 0.72 0.50 - 1.40 mg/dL Final     Glom Filt Rate, Est   Date/Time Value Ref Range Status   04/12/2022 09:09  >60 mL/min/1.7 Final     Comment:     Estimated GFR derived from the MDRD Study equation can be used in patients who are in the hospital.  However, it is important to pay attention to potential inaccuracies due to the non-steady state of serum creatinine, co-morbidities that cause malnutrition, and the use of medications that interfere with the measurement of serum creatinine.    The estimated GFR is only accurate for patients greater than 18 years of age.     Alkaline Phosphatase   Date/Time Value Ref Range Status   07/31/2023 03:19  (H) 30 - 99 U/L Final     AST(SGOT)   Date/Time Value Ref Range Status   07/31/2023 03:19 PM 27 12 - 45 U/L Final     ALT(SGPT)   Date/Time Value Ref Range Status  "  07/31/2023 03:19 PM 39 2 - 50 U/L Final     Total Bilirubin   Date/Time Value Ref Range Status   07/31/2023 03:19 PM 0.4 0.1 - 1.5 mg/dL Final      CPK Total   Date/Time Value Ref Range Status   07/16/2023 01:52 AM 41 0 - 154 U/L Final      Recent Labs     07/31/23  1519   ASTSGOT 27   ALTSGPT 39   TBILIRUBIN 0.4   ALKPHOSPHAT 202*   GLOBULIN 2.7     No results found for: BLOODCULTU, BLDCULT, BCHOLD    No results found for: BLOODCULTU, BLDCULT, BCHOLD       Blood Culture  Order: [id number]  Status: Edited Result - FINAL     Visible to patient: Yes (not seen)     Next appt: Today at 01:00 PM in Infectious Diseases (Husam Webber, A.P.R.N.)     Specimen Information: Peripheral; Blood   0 Result Notes      Component 3 wk ago   Significant Indicator POS Positive (POS)    Source BLD    Site PERIPHERAL    Culture Result Growth detected by Bactec instrument. 07/14/2023  00:46 Abnormal     Culture Result Streptococcus intermedius Abnormal     Resulting Agency V        Susceptibility     Streptococcus intermedius     E-TEST     Cefotaxime 0.047 mcg/mL Sensitive     Penicillin 0.023 mcg/mL Sensitive                 Narrative  Performed by: V  [place]  [facility]  [facility] [phone],   [place]  [facility] [place] [phone] 07/14/2023, 00:51, RB PERF. RESULTS CALLED TO:   [phone] RN   Per Hospital Policy: Only change Specimen Src: to \"Line\" if   specified by physician order.   Left AC      Specimen Collected: 07/12/23  3:57 PM Last Resulted: 07/16/23 11:41 AM         Culture Wound W/ Gram Stain  Order: [id number]  Status: Final result     Visible to patient: Yes (not seen)     Next appt: Today at 01:00 PM in Infectious Diseases (Husam Webber, A.P.R.N.)     Specimen Information: Left Foot; Wound   0 Result Notes      Component 3 wk ago   Significant Indicator POS Positive (POS)    Source WND    Site LEFT FOOT    Culture Result Heavy growth usual skin ramonita. Abnormal     Gram Stain Result Many WBCs.   Many mixed bacteria, no predominant organism " seen.    Culture Result  Abnormal   Staphylococcus aureus   Moderate growth   Methicillin sensitive by screening method     Resulting Agency M        Susceptibility     Staphylococcus aureus     CONCETTA     Ampicillin/sulbactam <=8/4 mcg/mL Sensitive     Azithromycin <=2 mcg/mL Sensitive     Cefazolin <=8 mcg/mL Sensitive     Cefepime <=4 mcg/mL Sensitive     Ceftaroline <=0.5 mcg/mL Sensitive     Clindamycin 0.5 mcg/mL Sensitive     Daptomycin 1 mcg/mL Sensitive     Erythromycin <=0.25 mcg/mL Sensitive     Oxacillin <=0.25 mcg/mL Sensitive     Pip/Tazobactam <=8 mcg/mL Sensitive     Tetracycline <=4 mcg/mL Sensitive     Trimeth/Sulfa <=0.5/9.5 m... Sensitive     Vancomycin 1 mcg/mL Sensitive                 Specimen Collected: 07/12/23  6:41 PM Last Resulted: 07/14/23 12:10 PM           CULTURE WOUND W/ GRAM STAIN  Order: [id number] - Reflex for Order [id number]  Status: Final result     Visible to patient: Yes (not seen)     Next appt: Today at 01:00 PM in Infectious Diseases (Husam Webber A.P.R.N.)     Specimen Information: Wound   0 Result Notes      Component 3 wk ago   Significant Indicator POS Positive (POS)    Source WND    Site left foot    Culture Result - Abnormal     Gram Stain Result  Abnormal   Many WBCs.   Many Gram positive cocci.     Culture Result  Abnormal   Streptococcus intermedius   Moderate growth     Culture Result  Abnormal   Staphylococcus aureus   Light growth   Methicillin sensitive by screening method     Culture Result  Abnormal   Corynebacterium striatum group   Light growth     Culture Result  Abnormal   Enterococcus faecalis   Rare growth     Resulting Agency M        Susceptibility     Staphylococcus aureus Enterococcus faecalis     CONCETTA CONCETTA     Ampicillin   <=2 mcg/mL Sensitive     Ampicillin/sulbactam <=8/4 mcg/mL Sensitive       Azithromycin <=2 mcg/mL Sensitive       Cefazolin <=8 mcg/mL Sensitive       Cefepime <=4 mcg/mL Sensitive       Ceftaroline <=0.5 mcg/mL Sensitive        Clindamycin <=0.25 mcg/mL Sensitive       Daptomycin <=0.5 mcg/mL Sensitive 1 mcg/mL Sensitive     Erythromycin <=0.25 mcg/mL Sensitive       Gent Synergy   <=500 mcg/mL Sensitive     Oxacillin <=0.25 mcg/mL Sensitive       Penicillin   2 mcg/mL Sensitive     Pip/Tazobactam <=8 mcg/mL Sensitive       Tetracycline <=4 mcg/mL Sensitive       Trimeth/Sulfa <=0.5/9.5 m... Sensitive       Vancomycin 1 mcg/mL Sensitive 1 mcg/mL Sensitive                   Narrative  Performed by: M  1 left foot swab #1   Surgery - swabs received      Specimen Collected: 07/12/23  6:45 PM Last Resulted: 07/16/23  7:27 AM         Contains abnormal data Anaerobic Culture  Order: [id number] - Reflex for Order [id number]  Status: Final result     Visible to patient: Yes (not seen)     Next appt: Today at 01:00 PM in Infectious Diseases (Husam Webber, A.P.R.N.)     Specimen Information: Wound   0 Result Notes      Component 3 wk ago   Significant Indicator POS Positive (POS)    Source WND    Site left foot    Culture Result - Abnormal     Culture Result  Abnormal   Bacteroides ovatus group   Rare growth     Culture Result  Abnormal   Prevotella disiens   Light growth     Resulting Agency M              Narrative  Performed by: M  1 left foot swab #1   Surgery - swabs received      Specimen Collected: 07/12/23  6:45 PM Last Resulted: 07/16/23 10:46 AM         REFERENCE MISC.AMBIENT  Order: [id number] - Reflex for Order [id number]  Status: Final result     Visible to patient: Yes (not seen)     Next appt: Today at 01:00 PM in Infectious Diseases (Husam Webber, A.P.R.N.)     0 Result Notes      Component 3 wk ago   Miscellaneous Lab Result SEE NOTE    Comment: Test name                     Result Flag Units  RefIntvl   -------------------------------------------------------------   Final Report                SEE NOTE   Corynebacterium striatum Group   Organism identified by client   MIC2   Penicillin                              >=16  R   Meropenem                                4  R   Clindamycin                             >=4  R   Ceftriaxone                             >=4  R   Levofloxacin                            >=8  None   Linezolid                               0.5  S   Erythromycin                            >=8  R   Vancomycin                              0.5  S   Gentamicin                              4  S   Doxycycline                             8  I   Trimethoprim/Sulfamethoxazole           >=4/76  R   Performed by [place],   [facility] [place] [place] [address] [phone]   [email or web address], Terrence Chen MD, PHD - Lab. Director    Resulting Agency [facility]              Narrative  Performed by: [email or web address]  Antimicrobial Susceptibility - Not Otherwise Specified [id number] [facility].   Actively growing Corynebacterium striatum group in pure culture. Source:   Left Foot. Ambient. 07/19/2023  10:25      Specimen Collected: 07/12/23  7:38 PM Last Resulted: 07/22/23  4:11 PM            Contains abnormal data CULTURE WOUND W/ GRAM STAIN  Order: [id number] - Reflex for Order [id number]  Status: Final result     Visible to patient: Yes (not seen)     Next appt: Today at 01:00 PM in Infectious Diseases (Husam Webber, A.P.R.N.)     Specimen Information: Wound   0 Result Notes      Component 3 wk ago   Significant Indicator POS Positive (POS)    Source WND    Site left foot #2    Culture Result - Abnormal     Gram Stain Result Many WBCs.   Moderate Gram positive cocci.    Culture Result  Abnormal   Streptococcus intermedius   Moderate growth     Culture Result  Abnormal   Staphylococcus aureus   Light growth     Culture Result  Abnormal   Corynebacterium striatum group   Rare growth     Culture Result  Abnormal   Enterococcus faecalis   Rare growth     Culture Result  Abnormal   Pasteurella species   Rare growth   Pasturella canis oralis     Resulting Agency M              Narrative  Performed by: M  2 left foot swab #2   Surgery - swabs received      Specimen  Collected: 07/12/23  7:38 PM Last Resulted: 07/19/23  7:27 AM             Impression/Assessment      1. Subacute osteomyelitis of left foot (HCC)        2. Abscess of left foot        3. Bacteremia        4. Polymicrobial bacterial infection        5. Neuropathy of both feet        6. Controlled type 2 diabetes mellitus with foot ulcer, without long-term current use of insulin ([facility])  Referral to Podiatry      7. Thrombocytopenia ([facility])          Esequiel Molina is a 33 y.o.  admitted 7/12/2023. Pt has a past medical history of DMT2, alcoholic cirrhosis and sober x2 years per notes, admitted with left foot pain, erythema and drainage.  MRI left foot with limited study due to motion artifact, some marrow enhancement the first metatarsal and first proximal likely representing osteomyelitis, extensive cellulitis of the great toe with extension into the midfoot.  Orthopedic surgery were consulted and the patient went to the OR.  Iron and D on 7/12/2023 with drainage of abscess from plantar and dorsal aspect of left foot. Per the procedure note, there was significant purulence noted and the wound bed probed all the way down to the lateral aspect of the dorsal forefoot.  OR cultures polymicrobial bacteria.  Streptococcal intermedius, MSSA, corynebacterium (Penicillin resistance, Bactrim resistance, doxycycline intermediate resistance), , enterococcal faecalis (pansensitivity), Pasturella canis oralis, anaerobic's Bacteroides ovatus group and Prevotella disiens.  Blood cultures on 7/12 + Streptococcus intermedius. BXc negative 7/14/23. Orthopedics has recommended further I&D's and likely first ray amputation but patient has refused.  Patient was discharged home with a 6-week course of IV ertapenem 1 g every 24 hours with a stop date of 8/25/2023.    Suspect the Corynebacterium and Enterococcus from the polymicrobial foot cultures to be colonizers. There is evidence for this approach particularly with Enterococcus in a  polymicrobial abscess being treated with ertapenem      08/02/23- Today Patient reports feeling well. Pt stating that the wound is healing well. Pt being followed by the [facility]ound clinic. Wound pictures reviewed in EPIC.  Tolerating IV ertapenem with no complaints of side effects.  Pending follow-up with orthopedic surgery.  Referral to podiatrist placed as patient has recurrent ulcers of bilateral feet in the past due to chronic neuropathy    High risk for failure irrespective of antibiotic choice and duration given burden of infection and patient's wishes to avoid further surgical intervention at this time  The patient is uninsured and has agreed to pay out of pocket for wound care services  PLAN:   - Continue IV ertapenem 1 g every 24 hours, stop date 8/25/2023  - Repeat Labs CBC/CMP weekly.  Most recent labs reviewed from 7/31/2023, WBC WNL 5.1, stable anemia 11.6/36.6, CMP mostly unremarkable, creatinine WNL 0.72, GFR , LFTs WNL, elevated alk phos stable 202  - Medication education provided and S/S of side effects discussed   - Recommend routine follow up with orthopedic surgery  - Recommended to start po probiotic  - Last A1c from 7/13/2023 5.8.  Previous diabetes resolved through diet and exercise.    -Referral placed for podiatrist due to chronic neuropathy of bilateral feet and recurrent ulcers.  Patient establish care at later date after osteomyelitis and left foot wounds heal.   -Recommend continued follow-up with wound care clinic weekly.  Patient is at high risk for worsening infection.    - If wound does not progress recommend amputation.  Patient is at extremely high risk for amputation due to polymicrobial infection  - Chronic thrombocytopenia, recommend follow-up with PCP for further work-up and evaluation  - Education provided on S/S of infection and when to report to ER/ call [phone]     Return visit: 3 weeks. Follow up with primary care physician for chronic medical problems      I have  performed a physical exam,  updated ROS and plan today. I have reviewed previous images, labs, and provider notes.      JESSICA Mckinnon.    All Patients should seek medical re-evaluation or report to the ER for new, increasing or worsening symptoms. In some circumstances medical conditions can change from the initial evaluation and may require emergent medical re-evaluation. This includes but is not limited to chest pain, shortness of breath, atypical abdominal pain, atypical headache, ALOC, fever >101, low blood pressure, high respiratory rate (above 30), low oxygen saturation (below 90%), acute delirium, abnormal bleeding, inability to tolerate any intake, weakness on one side of the body, any worsened or concerning conditions.    Please note that this dictation was created using voice recognition software. I have worked with technical experts from [facility] to optimize the interface.  I have made every reasonable attempt to correct obvious errors, but there may be errors of grammar and possibly content that I did not discover before finalizing the note.

## 2023-08-02 NOTE — PROGRESS NOTES
Pt returns to [facility] for Ertapenem infusion. LUE  PICC flushed with NS and brisk blood return observed.  Ertapenem infused without adverse reaction.  PICC flushed with NS, connector changed and capped, and line secured with sleeve.  Confirmed tomorrow's appt time with pt.  Pt dc home to self care in stable condition.

## 2023-08-02 NOTE — PROGRESS NOTES
Mr Molina is here today for daily invanz antibiotics.     He has no new concerns to report.    PICC line to his left upper arm, replaced yesterday. Flushes easily with good blood return.     Invanz infused per MAR over 30 minutes without adverse effects.    PICC line flushed after infusion, capped and sleeve replaced.    Mr Molina will return tomorrow.

## 2023-08-03 ENCOUNTER — NON-PROVIDER VISIT (OUTPATIENT)
Dept: WOUND CARE | Facility: MEDICAL CENTER | Age: 33
End: 2023-08-03
Attending: INTERNAL MEDICINE

## 2023-08-03 ENCOUNTER — OUTPATIENT INFUSION SERVICES (OUTPATIENT)
Dept: ONCOLOGY | Facility: MEDICAL CENTER | Age: 33
End: 2023-08-03
Attending: INTERNAL MEDICINE

## 2023-08-03 VITALS
SYSTOLIC BLOOD PRESSURE: 124 MMHG | OXYGEN SATURATION: 99 % | TEMPERATURE: 98 F | DIASTOLIC BLOOD PRESSURE: 71 MMHG | RESPIRATION RATE: 18 BRPM | HEART RATE: 88 BPM

## 2023-08-03 DIAGNOSIS — M86.172 ACUTE OSTEOMYELITIS OF LEFT FOOT (HCC): ICD-10-CM

## 2023-08-03 DIAGNOSIS — R78.81 BACTEREMIA: ICD-10-CM

## 2023-08-03 PROCEDURE — 96365 THER/PROPH/DIAG IV INF INIT: CPT

## 2023-08-03 PROCEDURE — 97602 WOUND(S) CARE NON-SELECTIVE: CPT

## 2023-08-03 PROCEDURE — 700105 HCHG RX REV CODE 258: Performed by: INTERNAL MEDICINE

## 2023-08-03 PROCEDURE — 700111 HCHG RX REV CODE 636 W/ 250 OVERRIDE (IP): Performed by: INTERNAL MEDICINE

## 2023-08-03 RX ORDER — 0.9 % SODIUM CHLORIDE 0.9 %
VIAL (ML) INJECTION PRN
Status: CANCELLED | OUTPATIENT
Start: 2023-08-04

## 2023-08-03 RX ORDER — 0.9 % SODIUM CHLORIDE 0.9 %
3 VIAL (ML) INJECTION PRN
Status: CANCELLED | OUTPATIENT
Start: 2023-08-04

## 2023-08-03 RX ORDER — 0.9 % SODIUM CHLORIDE 0.9 %
10 VIAL (ML) INJECTION PRN
Status: CANCELLED | OUTPATIENT
Start: 2023-08-04

## 2023-08-03 RX ADMIN — ERTAPENEM SODIUM 1000 MG: 1 INJECTION INTRAMUSCULAR; INTRAVENOUS at 15:01

## 2023-08-03 NOTE — PROCEDURES
Non-selective debridement to left foot wounds and danni-wound using foam cleanser and washcloth saline and gauze to remove biofilm and non-viable tissue.

## 2023-08-03 NOTE — PATIENT INSTRUCTIONS
-Keep your wound dressing clean, dry, and intact.    -Change your dressing if it becomes soiled, soaked, or falls off.      -Should you experience any significant changes in your wound(s), such as infection (redness, swelling, localized heat, increased pain, fever > 101 F, chills) or have any questions regarding your home care instructions, please contact the wound center at [phone]. If after hours, contact your primary care physician or go to the hospital emergency room.

## 2023-08-03 NOTE — PROGRESS NOTES
Esequiel arrived ambulatory with a knee scooter to infusion services for his daily Ertapenem infusion. He reports no changes since yesterday. His PICC was accessed, positive blood return noted, flushed and antibiotics infused with no complications. His PICC was flushed post infusion with positive blood return noted again. He was then discharged home in stable condition.

## 2023-08-04 ENCOUNTER — OUTPATIENT INFUSION SERVICES (OUTPATIENT)
Dept: ONCOLOGY | Facility: MEDICAL CENTER | Age: 33
End: 2023-08-04
Attending: INTERNAL MEDICINE

## 2023-08-04 VITALS
HEART RATE: 83 BPM | TEMPERATURE: 97.5 F | RESPIRATION RATE: 17 BRPM | OXYGEN SATURATION: 97 % | SYSTOLIC BLOOD PRESSURE: 116 MMHG | DIASTOLIC BLOOD PRESSURE: 66 MMHG

## 2023-08-04 DIAGNOSIS — R78.81 BACTEREMIA: ICD-10-CM

## 2023-08-04 DIAGNOSIS — M86.172 ACUTE OSTEOMYELITIS OF LEFT FOOT (HCC): ICD-10-CM

## 2023-08-04 PROCEDURE — 96365 THER/PROPH/DIAG IV INF INIT: CPT

## 2023-08-04 PROCEDURE — 700111 HCHG RX REV CODE 636 W/ 250 OVERRIDE (IP): Performed by: INTERNAL MEDICINE

## 2023-08-04 PROCEDURE — 700105 HCHG RX REV CODE 258: Performed by: INTERNAL MEDICINE

## 2023-08-04 RX ORDER — 0.9 % SODIUM CHLORIDE 0.9 %
10 VIAL (ML) INJECTION PRN
Status: CANCELLED | OUTPATIENT
Start: 2023-08-05

## 2023-08-04 RX ORDER — 0.9 % SODIUM CHLORIDE 0.9 %
VIAL (ML) INJECTION PRN
Status: CANCELLED | OUTPATIENT
Start: 2023-08-05

## 2023-08-04 RX ORDER — 0.9 % SODIUM CHLORIDE 0.9 %
3 VIAL (ML) INJECTION PRN
Status: CANCELLED | OUTPATIENT
Start: 2023-08-05

## 2023-08-04 RX ADMIN — ERTAPENEM SODIUM 1000 MG: 1 INJECTION INTRAMUSCULAR; INTRAVENOUS at 18:10

## 2023-08-05 ENCOUNTER — OUTPATIENT INFUSION SERVICES (OUTPATIENT)
Dept: ONCOLOGY | Facility: MEDICAL CENTER | Age: 33
End: 2023-08-05
Attending: INTERNAL MEDICINE

## 2023-08-05 DIAGNOSIS — R78.81 BACTEREMIA: ICD-10-CM

## 2023-08-05 DIAGNOSIS — M86.172 ACUTE OSTEOMYELITIS OF LEFT FOOT (HCC): ICD-10-CM

## 2023-08-05 PROCEDURE — 96365 THER/PROPH/DIAG IV INF INIT: CPT

## 2023-08-05 PROCEDURE — 700105 HCHG RX REV CODE 258: Performed by: INTERNAL MEDICINE

## 2023-08-05 PROCEDURE — 700111 HCHG RX REV CODE 636 W/ 250 OVERRIDE (IP): Mod: JZ | Performed by: INTERNAL MEDICINE

## 2023-08-05 RX ORDER — 0.9 % SODIUM CHLORIDE 0.9 %
3 VIAL (ML) INJECTION PRN
Status: CANCELLED | OUTPATIENT
Start: 2023-08-06

## 2023-08-05 RX ORDER — 0.9 % SODIUM CHLORIDE 0.9 %
10 VIAL (ML) INJECTION PRN
Status: CANCELLED | OUTPATIENT
Start: 2023-08-06

## 2023-08-05 RX ORDER — 0.9 % SODIUM CHLORIDE 0.9 %
VIAL (ML) INJECTION PRN
Status: CANCELLED | OUTPATIENT
Start: 2023-08-06

## 2023-08-05 RX ADMIN — ERTAPENEM 1000 MG: 1 INJECTION, POWDER, LYOPHILIZED, FOR SOLUTION INTRAMUSCULAR; INTRAVENOUS at 17:45

## 2023-08-05 NOTE — PROGRESS NOTES
Pt arrived with knee scooter to [facility] for daily Invanz. POC discussed and pt verbalized understanding. FIDELE PRISCILA PICC in place, flushes well with brisk blood return noted and dressing CDI. Invanz administered per MAR and pt tolerated well. PICC flushed, claves changed and line wrapped in sterile gauze/protective sleeve. [facility], and pt discharged to self care in [facility].

## 2023-08-06 ENCOUNTER — OUTPATIENT INFUSION SERVICES (OUTPATIENT)
Dept: ONCOLOGY | Facility: MEDICAL CENTER | Age: 33
End: 2023-08-06
Attending: INTERNAL MEDICINE

## 2023-08-06 VITALS
OXYGEN SATURATION: 98 % | SYSTOLIC BLOOD PRESSURE: 139 MMHG | WEIGHT: 207.89 LBS | TEMPERATURE: 97 F | BODY MASS INDEX: 24.65 KG/M2 | RESPIRATION RATE: 18 BRPM | DIASTOLIC BLOOD PRESSURE: 69 MMHG | HEART RATE: 80 BPM

## 2023-08-06 DIAGNOSIS — R78.81 BACTEREMIA: ICD-10-CM

## 2023-08-06 DIAGNOSIS — M86.172 ACUTE OSTEOMYELITIS OF LEFT FOOT (HCC): ICD-10-CM

## 2023-08-06 PROCEDURE — 96365 THER/PROPH/DIAG IV INF INIT: CPT

## 2023-08-06 PROCEDURE — 700105 HCHG RX REV CODE 258: Performed by: INTERNAL MEDICINE

## 2023-08-06 PROCEDURE — 700111 HCHG RX REV CODE 636 W/ 250 OVERRIDE (IP): Mod: JZ | Performed by: INTERNAL MEDICINE

## 2023-08-06 RX ORDER — 0.9 % SODIUM CHLORIDE 0.9 %
3 VIAL (ML) INJECTION PRN
Status: CANCELLED | OUTPATIENT
Start: 2023-08-07

## 2023-08-06 RX ORDER — 0.9 % SODIUM CHLORIDE 0.9 %
VIAL (ML) INJECTION PRN
Status: CANCELLED | OUTPATIENT
Start: 2023-08-07

## 2023-08-06 RX ORDER — 0.9 % SODIUM CHLORIDE 0.9 %
10 VIAL (ML) INJECTION PRN
Status: CANCELLED | OUTPATIENT
Start: 2023-08-07

## 2023-08-06 RX ADMIN — ERTAPENEM 1000 MG: 1 INJECTION, POWDER, LYOPHILIZED, FOR SOLUTION INTRAMUSCULAR; INTRAVENOUS at 17:51

## 2023-08-06 ASSESSMENT — FIBROSIS 4 INDEX: FIB4 SCORE: 1.5

## 2023-08-06 NOTE — PROGRESS NOTES
Pt arrived to [facility] for daily Invanz infusion for left foot osteomyelitis. POC discussed with pt and he agrees with plan. Pt states he had wound care dressing change today.     BRITTANY PICC with brisk blood return noted. Pt medicated per MAR. Pt tolerated treatment without s/s adverse reaction. PCC flushed with NS warpped with gauze and mesh sleeve. Pt discharged to self care, NAD. Pt to return tomorrow.

## 2023-08-07 ENCOUNTER — OUTPATIENT INFUSION SERVICES (OUTPATIENT)
Dept: ONCOLOGY | Facility: MEDICAL CENTER | Age: 33
End: 2023-08-07
Attending: INTERNAL MEDICINE

## 2023-08-07 VITALS
WEIGHT: 228.18 LBS | SYSTOLIC BLOOD PRESSURE: 126 MMHG | TEMPERATURE: 97.2 F | DIASTOLIC BLOOD PRESSURE: 65 MMHG | BODY MASS INDEX: 27.06 KG/M2 | HEART RATE: 94 BPM | RESPIRATION RATE: 18 BRPM | OXYGEN SATURATION: 97 %

## 2023-08-07 DIAGNOSIS — M86.172 ACUTE OSTEOMYELITIS OF LEFT FOOT (HCC): ICD-10-CM

## 2023-08-07 DIAGNOSIS — R78.81 BACTEREMIA: ICD-10-CM

## 2023-08-07 LAB
ALBUMIN SERPL BCP-MCNC: 4.2 G/DL (ref 3.2–4.9)
ALBUMIN/GLOB SERPL: 1.8 G/DL
ALP SERPL-CCNC: 198 U/L (ref 30–99)
ALT SERPL-CCNC: 40 U/L (ref 2–50)
ANION GAP SERPL CALC-SCNC: 10 MMOL/L (ref 7–16)
AST SERPL-CCNC: 32 U/L (ref 12–45)
BASOPHILS # BLD AUTO: 0.7 % (ref 0–1.8)
BASOPHILS # BLD: 0.03 K/UL (ref 0–0.12)
BILIRUB SERPL-MCNC: 0.6 MG/DL (ref 0.1–1.5)
BUN SERPL-MCNC: 8 MG/DL (ref 8–22)
CALCIUM ALBUM COR SERPL-MCNC: 8.8 MG/DL (ref 8.5–10.5)
CALCIUM SERPL-MCNC: 9 MG/DL (ref 8.5–10.5)
CHLORIDE SERPL-SCNC: 103 MMOL/L (ref 96–112)
CO2 SERPL-SCNC: 26 MMOL/L (ref 20–33)
CREAT SERPL-MCNC: 0.95 MG/DL (ref 0.5–1.4)
CRP SERPL HS-MCNC: <0.3 MG/DL (ref 0–0.75)
EOSINOPHIL # BLD AUTO: 0.32 K/UL (ref 0–0.51)
EOSINOPHIL NFR BLD: 7.9 % (ref 0–6.9)
ERYTHROCYTE [DISTWIDTH] IN BLOOD BY AUTOMATED COUNT: 46.4 FL (ref 35.9–50)
ERYTHROCYTE [SEDIMENTATION RATE] IN BLOOD BY WESTERGREN METHOD: 4 MM/HOUR (ref 0–20)
GFR SERPLBLD CREATININE-BSD FMLA CKD-EPI: 108 ML/MIN/1.73 M 2
GLOBULIN SER CALC-MCNC: 2.4 G/DL (ref 1.9–3.5)
GLUCOSE SERPL-MCNC: 148 MG/DL (ref 65–99)
HCT VFR BLD AUTO: 37.6 % (ref 42–52)
HGB BLD-MCNC: 11.9 G/DL (ref 14–18)
IMM GRANULOCYTES # BLD AUTO: 0.03 K/UL (ref 0–0.11)
IMM GRANULOCYTES NFR BLD AUTO: 0.7 % (ref 0–0.9)
LYMPHOCYTES # BLD AUTO: 1.11 K/UL (ref 1–4.8)
LYMPHOCYTES NFR BLD: 27.5 % (ref 22–41)
MCH RBC QN AUTO: 28.5 PG (ref 27–33)
MCHC RBC AUTO-ENTMCNC: 31.6 G/DL (ref 32.3–36.5)
MCV RBC AUTO: 90 FL (ref 81.4–97.8)
MONOCYTES # BLD AUTO: 0.28 K/UL (ref 0–0.85)
MONOCYTES NFR BLD AUTO: 6.9 % (ref 0–13.4)
NEUTROPHILS # BLD AUTO: 2.27 K/UL (ref 1.82–7.42)
NEUTROPHILS NFR BLD: 56.3 % (ref 44–72)
NRBC # BLD AUTO: 0 K/UL
NRBC BLD-RTO: 0 /100 WBC (ref 0–0.2)
OUTPT INFUS CBC COMMENT OICOM: ABNORMAL
PLATELET # BLD AUTO: 69 K/UL (ref 164–446)
PLATELETS.RETICULATED NFR BLD AUTO: 12.4 % (ref 0.6–13.1)
PMV BLD AUTO: 13.3 FL (ref 9–12.9)
POTASSIUM SERPL-SCNC: 4.2 MMOL/L (ref 3.6–5.5)
PROT SERPL-MCNC: 6.6 G/DL (ref 6–8.2)
RBC # BLD AUTO: 4.18 M/UL (ref 4.7–6.1)
SODIUM SERPL-SCNC: 139 MMOL/L (ref 135–145)
WBC # BLD AUTO: 4 K/UL (ref 4.8–10.8)

## 2023-08-07 PROCEDURE — 96365 THER/PROPH/DIAG IV INF INIT: CPT

## 2023-08-07 PROCEDURE — 700105 HCHG RX REV CODE 258: Performed by: INTERNAL MEDICINE

## 2023-08-07 PROCEDURE — 86140 C-REACTIVE PROTEIN: CPT

## 2023-08-07 PROCEDURE — 80053 COMPREHEN METABOLIC PANEL: CPT

## 2023-08-07 PROCEDURE — 85025 COMPLETE CBC W/AUTO DIFF WBC: CPT

## 2023-08-07 PROCEDURE — 85055 RETICULATED PLATELET ASSAY: CPT

## 2023-08-07 PROCEDURE — 700111 HCHG RX REV CODE 636 W/ 250 OVERRIDE (IP): Mod: JZ | Performed by: INTERNAL MEDICINE

## 2023-08-07 PROCEDURE — 85652 RBC SED RATE AUTOMATED: CPT

## 2023-08-07 RX ORDER — 0.9 % SODIUM CHLORIDE 0.9 %
VIAL (ML) INJECTION PRN
Status: CANCELLED | OUTPATIENT
Start: 2023-08-08

## 2023-08-07 RX ORDER — 0.9 % SODIUM CHLORIDE 0.9 %
3 VIAL (ML) INJECTION PRN
Status: CANCELLED | OUTPATIENT
Start: 2023-08-08

## 2023-08-07 RX ORDER — 0.9 % SODIUM CHLORIDE 0.9 %
10 VIAL (ML) INJECTION PRN
Status: CANCELLED | OUTPATIENT
Start: 2023-08-08

## 2023-08-07 RX ADMIN — ERTAPENEM 1000 MG: 1 INJECTION, POWDER, LYOPHILIZED, FOR SOLUTION INTRAMUSCULAR; INTRAVENOUS at 17:51

## 2023-08-07 ASSESSMENT — FIBROSIS 4 INDEX: FIB4 SCORE: 1.5

## 2023-08-07 NOTE — PROGRESS NOTES
Pt arrived to [facility] for daily Invanz infusion for osteomyelitis. POC discussed with pt and he agrees with plan.     BRITTANY PICC with brisk blood return, flushed with NS. Pt medicated per MAR. Pt tolerated treatment without s/s adverse reaction. PICC flushed with NS, wrapped with mesh sleeve. Pt discharged to self care, NAD. Pt to return tomorrow.

## 2023-08-08 ENCOUNTER — OUTPATIENT INFUSION SERVICES (OUTPATIENT)
Dept: ONCOLOGY | Facility: MEDICAL CENTER | Age: 33
End: 2023-08-08
Attending: INTERNAL MEDICINE

## 2023-08-08 VITALS
OXYGEN SATURATION: 97 % | RESPIRATION RATE: 18 BRPM | HEART RATE: 88 BPM | SYSTOLIC BLOOD PRESSURE: 113 MMHG | TEMPERATURE: 96.7 F | DIASTOLIC BLOOD PRESSURE: 64 MMHG

## 2023-08-08 DIAGNOSIS — R78.81 BACTEREMIA: ICD-10-CM

## 2023-08-08 DIAGNOSIS — M86.172 ACUTE OSTEOMYELITIS OF LEFT FOOT (HCC): ICD-10-CM

## 2023-08-08 LAB
FUNGUS SPEC CULT: NORMAL
FUNGUS SPEC CULT: NORMAL
FUNGUS SPEC FUNGUS STN: NORMAL
FUNGUS SPEC FUNGUS STN: NORMAL
SIGNIFICANT IND 70042: NORMAL
SIGNIFICANT IND 70042: NORMAL
SITE SITE: NORMAL
SITE SITE: NORMAL
SOURCE SOURCE: NORMAL
SOURCE SOURCE: NORMAL

## 2023-08-08 PROCEDURE — 700105 HCHG RX REV CODE 258: Performed by: INTERNAL MEDICINE

## 2023-08-08 PROCEDURE — 96365 THER/PROPH/DIAG IV INF INIT: CPT

## 2023-08-08 PROCEDURE — 700111 HCHG RX REV CODE 636 W/ 250 OVERRIDE (IP): Performed by: INTERNAL MEDICINE

## 2023-08-08 RX ORDER — 0.9 % SODIUM CHLORIDE 0.9 %
10 VIAL (ML) INJECTION PRN
Status: CANCELLED | OUTPATIENT
Start: 2023-08-09

## 2023-08-08 RX ORDER — 0.9 % SODIUM CHLORIDE 0.9 %
3 VIAL (ML) INJECTION PRN
Status: CANCELLED | OUTPATIENT
Start: 2023-08-09

## 2023-08-08 RX ORDER — 0.9 % SODIUM CHLORIDE 0.9 %
VIAL (ML) INJECTION PRN
Status: CANCELLED | OUTPATIENT
Start: 2023-08-09

## 2023-08-08 RX ADMIN — ERTAPENEM 1000 MG: 1 INJECTION, POWDER, LYOPHILIZED, FOR SOLUTION INTRAMUSCULAR; INTRAVENOUS at 17:59

## 2023-08-08 NOTE — PROGRESS NOTES
Esequiel presented to [facility] utilizing a knee scooter for daily Invanz. FIDELE  PICC in place, flushes well with brisk blood return noted. Labs drawn as ordered. Invanz administered per MAR and pt tolerated well. PICC with positive blood return after and flushed easily. PICC line wrapped in sterile gauze and protective sleeve. Pt has next appointment. Pt discharged to home in good condition.

## 2023-08-09 ENCOUNTER — OUTPATIENT INFUSION SERVICES (OUTPATIENT)
Dept: ONCOLOGY | Facility: MEDICAL CENTER | Age: 33
End: 2023-08-09
Attending: INTERNAL MEDICINE

## 2023-08-09 VITALS
TEMPERATURE: 98 F | SYSTOLIC BLOOD PRESSURE: 119 MMHG | OXYGEN SATURATION: 98 % | DIASTOLIC BLOOD PRESSURE: 62 MMHG | RESPIRATION RATE: 18 BRPM | HEART RATE: 86 BPM

## 2023-08-09 DIAGNOSIS — M86.172 ACUTE OSTEOMYELITIS OF LEFT FOOT (HCC): ICD-10-CM

## 2023-08-09 DIAGNOSIS — R78.81 BACTEREMIA: ICD-10-CM

## 2023-08-09 PROCEDURE — 700111 HCHG RX REV CODE 636 W/ 250 OVERRIDE (IP): Performed by: INTERNAL MEDICINE

## 2023-08-09 PROCEDURE — 96365 THER/PROPH/DIAG IV INF INIT: CPT

## 2023-08-09 PROCEDURE — 700105 HCHG RX REV CODE 258: Performed by: INTERNAL MEDICINE

## 2023-08-09 RX ORDER — 0.9 % SODIUM CHLORIDE 0.9 %
VIAL (ML) INJECTION PRN
Status: CANCELLED | OUTPATIENT
Start: 2023-08-10

## 2023-08-09 RX ORDER — 0.9 % SODIUM CHLORIDE 0.9 %
10 VIAL (ML) INJECTION PRN
Status: CANCELLED | OUTPATIENT
Start: 2023-08-10

## 2023-08-09 RX ORDER — 0.9 % SODIUM CHLORIDE 0.9 %
3 VIAL (ML) INJECTION PRN
Status: CANCELLED | OUTPATIENT
Start: 2023-08-10

## 2023-08-09 RX ADMIN — ERTAPENEM 1000 MG: 1 INJECTION, POWDER, LYOPHILIZED, FOR SOLUTION INTRAMUSCULAR; INTRAVENOUS at 18:24

## 2023-08-09 NOTE — PROGRESS NOTES
Esequiel presented to [facility] utilizing a knee scooter for daily Invanz. FIDELE SL PICC in place, flushes well with brisk blood return noted. Invanz administered per MAR and pt tolerated well. PICC with positive blood return after and flushed easily. Needleless connectors changed per protocol. PICC line wrapped in sterile gauze and protective sleeve. Pt has next appointment. Pt discharged to home in good condition.

## 2023-08-10 ENCOUNTER — NON-PROVIDER VISIT (OUTPATIENT)
Dept: WOUND CARE | Facility: MEDICAL CENTER | Age: 33
End: 2023-08-10
Attending: INTERNAL MEDICINE

## 2023-08-10 ENCOUNTER — OUTPATIENT INFUSION SERVICES (OUTPATIENT)
Dept: ONCOLOGY | Facility: MEDICAL CENTER | Age: 33
End: 2023-08-10
Attending: INTERNAL MEDICINE

## 2023-08-10 VITALS
RESPIRATION RATE: 18 BRPM | DIASTOLIC BLOOD PRESSURE: 67 MMHG | TEMPERATURE: 96.5 F | OXYGEN SATURATION: 96 % | HEART RATE: 89 BPM | SYSTOLIC BLOOD PRESSURE: 114 MMHG

## 2023-08-10 DIAGNOSIS — R78.81 BACTEREMIA: ICD-10-CM

## 2023-08-10 DIAGNOSIS — M86.172 ACUTE OSTEOMYELITIS OF LEFT FOOT (HCC): ICD-10-CM

## 2023-08-10 PROCEDURE — 97597 DBRDMT OPN WND 1ST 20 CM/<: CPT

## 2023-08-10 PROCEDURE — 700105 HCHG RX REV CODE 258: Performed by: INTERNAL MEDICINE

## 2023-08-10 PROCEDURE — 700111 HCHG RX REV CODE 636 W/ 250 OVERRIDE (IP): Performed by: INTERNAL MEDICINE

## 2023-08-10 PROCEDURE — 96365 THER/PROPH/DIAG IV INF INIT: CPT

## 2023-08-10 RX ORDER — AMOXICILLIN AND CLAVULANATE POTASSIUM 875; 125 MG/1; MG/1
1 TABLET, FILM COATED ORAL 2 TIMES DAILY
Qty: 60 TABLET | Refills: 0 | Status: SHIPPED | OUTPATIENT
Start: 2023-08-10 | End: 2023-09-09

## 2023-08-10 RX ORDER — 0.9 % SODIUM CHLORIDE 0.9 %
10 VIAL (ML) INJECTION PRN
Status: CANCELLED | OUTPATIENT
Start: 2023-08-11

## 2023-08-10 RX ORDER — 0.9 % SODIUM CHLORIDE 0.9 %
VIAL (ML) INJECTION PRN
Status: CANCELLED | OUTPATIENT
Start: 2023-08-11

## 2023-08-10 RX ORDER — 0.9 % SODIUM CHLORIDE 0.9 %
3 VIAL (ML) INJECTION PRN
Status: CANCELLED | OUTPATIENT
Start: 2023-08-11

## 2023-08-10 RX ADMIN — ERTAPENEM 1000 MG: 1 INJECTION, POWDER, LYOPHILIZED, FOR SOLUTION INTRAMUSCULAR; INTRAVENOUS at 17:53

## 2023-08-10 NOTE — PROGRESS NOTES
Pt returns to [facility] for IV ertapenem.  PICC flushed with NS and brisk blood return observed.  Ertapenem infused without adverse reaction.  PICC flushed with NS and line secured with sleeve.  Confirmed tomorrow's appt time with pt.  Pt dc home to self care.

## 2023-08-10 NOTE — PROGRESS NOTES
CSWD using curette to remove ~2 to 3cm2 nonviable tissue from left plantar and medial 1st mth wound beds. Patient tolerated well; denied pain.    Patient presents to clinic today with new wound to left dorsal hallux, patient stated he is unsure of how this got here. The wound is boggy and draining serous/serosanguinous drainage, more so than the left lateral hallux.

## 2023-08-10 NOTE — PATIENT INSTRUCTIONS
-Keep your wound dressing clean, dry, and intact.    -Change your dressing if it becomes soiled, soaked, or falls off.      -Should you experience any significant changes in your wound(s), such as infection (redness, swelling, localized heat, increased pain, fever > 101 F, chills) or have any questions regarding your home care instructions, please contact the wound center at [phone]. If after hours, contact your primary care physician or go to the hospital emergency room.

## 2023-08-11 ENCOUNTER — TELEPHONE (OUTPATIENT)
Dept: INFECTIOUS DISEASES | Facility: MEDICAL CENTER | Age: 33
End: 2023-08-11

## 2023-08-11 ENCOUNTER — OUTPATIENT INFUSION SERVICES (OUTPATIENT)
Dept: ONCOLOGY | Facility: MEDICAL CENTER | Age: 33
End: 2023-08-11
Attending: INTERNAL MEDICINE

## 2023-08-11 VITALS
WEIGHT: 229.28 LBS | BODY MASS INDEX: 27.19 KG/M2 | SYSTOLIC BLOOD PRESSURE: 124 MMHG | RESPIRATION RATE: 18 BRPM | HEART RATE: 79 BPM | TEMPERATURE: 98.6 F | OXYGEN SATURATION: 98 % | DIASTOLIC BLOOD PRESSURE: 62 MMHG

## 2023-08-11 DIAGNOSIS — R78.81 BACTEREMIA: ICD-10-CM

## 2023-08-11 DIAGNOSIS — M86.172 ACUTE OSTEOMYELITIS OF LEFT FOOT (HCC): ICD-10-CM

## 2023-08-11 RX ORDER — 0.9 % SODIUM CHLORIDE 0.9 %
10 VIAL (ML) INJECTION PRN
Status: CANCELLED | OUTPATIENT
Start: 2023-08-12

## 2023-08-11 RX ORDER — 0.9 % SODIUM CHLORIDE 0.9 %
3 VIAL (ML) INJECTION PRN
Status: CANCELLED | OUTPATIENT
Start: 2023-08-12

## 2023-08-11 RX ORDER — 0.9 % SODIUM CHLORIDE 0.9 %
VIAL (ML) INJECTION PRN
Status: CANCELLED | OUTPATIENT
Start: 2023-08-12

## 2023-08-11 ASSESSMENT — FIBROSIS 4 INDEX: FIB4 SCORE: 2.42

## 2023-08-11 NOTE — TELEPHONE ENCOUNTER
----- Message from Nell Steven M.D. sent at 8/10/2023  7:23 PM PDT -----  Regarding: Abnormal labs  DASIA Miranda was notified by Kika, infusion RN about patient's labs. His platelets and white count have been decreasing while on IV ertapenem.  Please call the patient and let him know that we will discontinue ertapenem infusions and transition him to oral augmentin 875 mg BID to complete abx course thru 8/25. I will send Rx to his pharmacy    Thanks

## 2023-08-11 NOTE — TELEPHONE ENCOUNTER
Spoke with patient he has picked up with the oral abx, I relayed Dr Steven's message he has done some research and is just concerned that this medication is what's best for him. I offered to schedule him next week to discuss concerns with APRN since he is currently scheduled to see him 8/23/23. Patient will keep 8/23 appt and will reach out to us if he wishes to come in.

## 2023-08-11 NOTE — TELEPHONE ENCOUNTER
Spoke with patient he has concerns of being changed to an abx that may no be strong enough? Wants to clarify that this is the best option even if labs are decreasing because he has no insurance and stated [facility] was covering his IV ABX. I advised him to call pharmacy and check how much the oral abx will cost and to us [facility] discount as well. Patient will call me back to see if he can afford the oral abx

## 2023-08-11 NOTE — TELEPHONE ENCOUNTER
Nell Steven M.D.  You 17 minutes ago (12:46 PM)       Spoke with patient and discussed oral antibiotics in detail.  He will go to the infusion center today and have his PICC line removed

## 2023-08-11 NOTE — PROGRESS NOTES
Pt presents to [facility] for daily ertapenam. L PICC line in place; brisk blood return observed and pt tolerated well. Ertapenam infused with no s/s of adverse reactions. Brisk blood return observed from PICC line before flushed and wrapped in gauze for tomorrow. Next appointment confirmed and education provided. Pt discharged to self care with all personal belongings and in NAD.

## 2023-08-11 NOTE — TELEPHONE ENCOUNTER
Nell Steven M.D.  You 33 minutes ago (11:07 AM)       Yes this antibiotic has great bone penetration

## 2023-08-12 ENCOUNTER — APPOINTMENT (OUTPATIENT)
Dept: ONCOLOGY | Facility: MEDICAL CENTER | Age: 33
End: 2023-08-12
Attending: INTERNAL MEDICINE

## 2023-08-12 NOTE — PROGRESS NOTES
Karan arrived to the Infusion Center for PICC line removal via knee scooter. POC reviewed. Pt reported taking his PO abx today. PICC line removed per protocol, line noted at 46 cm. D/C instructions reviewed, Pt able to teach back.  Pt monitored for 30 minutes, no s/sx of bleeding noted. Karan was discharged home in no acute distress.

## 2023-08-13 ENCOUNTER — APPOINTMENT (OUTPATIENT)
Dept: ONCOLOGY | Facility: MEDICAL CENTER | Age: 33
End: 2023-08-13
Attending: INTERNAL MEDICINE

## 2023-08-14 ENCOUNTER — APPOINTMENT (OUTPATIENT)
Dept: ONCOLOGY | Facility: MEDICAL CENTER | Age: 33
End: 2023-08-14
Attending: INTERNAL MEDICINE

## 2023-08-15 ENCOUNTER — APPOINTMENT (OUTPATIENT)
Dept: ONCOLOGY | Facility: MEDICAL CENTER | Age: 33
End: 2023-08-15
Attending: INTERNAL MEDICINE

## 2023-08-16 ENCOUNTER — NON-PROVIDER VISIT (OUTPATIENT)
Dept: WOUND CARE | Facility: MEDICAL CENTER | Age: 33
End: 2023-08-16
Attending: INTERNAL MEDICINE

## 2023-08-16 ENCOUNTER — APPOINTMENT (OUTPATIENT)
Dept: ONCOLOGY | Facility: MEDICAL CENTER | Age: 33
End: 2023-08-16
Attending: INTERNAL MEDICINE

## 2023-08-16 PROCEDURE — 97597 DBRDMT OPN WND 1ST 20 CM/<: CPT

## 2023-08-16 NOTE — PATIENT INSTRUCTIONS
Should you experience any significant changes in your wound(s) such as infection (redness, swelling, localized heat, increased pain, fever >101 F, chills) or have any questions regarding your home care instructions, please contact the wound center [phone]. If after hours, contact your primary care physician or go the hospital emergency room.  Keep dressing clean and dry and cover while bathing. Only change dressing if over saturated, soiled or its falling off or daily for yor toe wounds & every 3 days for the other left foot wounds as demonstrated during your appointment. Keep your blood sugars under tight control and continue to offload your foot. Consider getting insurance to be able to have a NPWT.

## 2023-08-16 NOTE — PROGRESS NOTES
Changed to iodoform strip packing for 1 week to left hallux wound due to concern over infection with new site opening, Probing close to bone, significant creamy serosanguinous drainage when palpated. Patient already on antibiotics & following by ID. Added collagen to left 1st MTH wounds to assist in granulation tissue formation.   Patient reports he is unable to have a VAC as no insurance & makes too much to qualify.

## 2023-08-16 NOTE — PROCEDURES
CSWD with curette to remove non viable dried crust, slough and biofilm from left foot wounds of <20cm2.

## 2023-08-17 ENCOUNTER — APPOINTMENT (OUTPATIENT)
Dept: ONCOLOGY | Facility: MEDICAL CENTER | Age: 33
End: 2023-08-17
Attending: INTERNAL MEDICINE

## 2023-08-18 ENCOUNTER — APPOINTMENT (OUTPATIENT)
Dept: ONCOLOGY | Facility: MEDICAL CENTER | Age: 33
End: 2023-08-18
Attending: INTERNAL MEDICINE

## 2023-08-19 ENCOUNTER — APPOINTMENT (OUTPATIENT)
Dept: ONCOLOGY | Facility: MEDICAL CENTER | Age: 33
End: 2023-08-19
Attending: INTERNAL MEDICINE

## 2023-08-20 ENCOUNTER — APPOINTMENT (OUTPATIENT)
Dept: ONCOLOGY | Facility: MEDICAL CENTER | Age: 33
End: 2023-08-20
Attending: INTERNAL MEDICINE

## 2023-08-21 ENCOUNTER — APPOINTMENT (OUTPATIENT)
Dept: ONCOLOGY | Facility: MEDICAL CENTER | Age: 33
End: 2023-08-21
Attending: INTERNAL MEDICINE

## 2023-08-22 ENCOUNTER — APPOINTMENT (OUTPATIENT)
Dept: ONCOLOGY | Facility: MEDICAL CENTER | Age: 33
End: 2023-08-22
Attending: INTERNAL MEDICINE

## 2023-08-23 ENCOUNTER — APPOINTMENT (OUTPATIENT)
Dept: ONCOLOGY | Facility: MEDICAL CENTER | Age: 33
End: 2023-08-23
Attending: INTERNAL MEDICINE

## 2023-08-24 ENCOUNTER — OFFICE VISIT (OUTPATIENT)
Dept: WOUND CARE | Facility: MEDICAL CENTER | Age: 33
End: 2023-08-24
Attending: INTERNAL MEDICINE

## 2023-08-24 ENCOUNTER — APPOINTMENT (OUTPATIENT)
Dept: ONCOLOGY | Facility: MEDICAL CENTER | Age: 33
End: 2023-08-24
Attending: INTERNAL MEDICINE

## 2023-08-24 VITALS
SYSTOLIC BLOOD PRESSURE: 143 MMHG | TEMPERATURE: 97.1 F | OXYGEN SATURATION: 100 % | HEART RATE: 97 BPM | RESPIRATION RATE: 18 BRPM | DIASTOLIC BLOOD PRESSURE: 94 MMHG

## 2023-08-24 DIAGNOSIS — M86.672 OTHER CHRONIC OSTEOMYELITIS OF LEFT FOOT (HCC): ICD-10-CM

## 2023-08-24 DIAGNOSIS — E11.8 CONTROLLED TYPE 2 DIABETES MELLITUS WITH COMPLICATION, WITHOUT LONG-TERM CURRENT USE OF INSULIN (HCC): ICD-10-CM

## 2023-08-24 DIAGNOSIS — S91.302D OPEN WOUND OF LEFT FOOT, SUBSEQUENT ENCOUNTER: ICD-10-CM

## 2023-08-24 PROCEDURE — 3080F DIAST BP >= 90 MM HG: CPT | Performed by: NURSE PRACTITIONER

## 2023-08-24 PROCEDURE — 99213 OFFICE O/P EST LOW 20 MIN: CPT

## 2023-08-24 PROCEDURE — 99213 OFFICE O/P EST LOW 20 MIN: CPT | Mod: 25 | Performed by: NURSE PRACTITIONER

## 2023-08-24 PROCEDURE — 3077F SYST BP >= 140 MM HG: CPT | Performed by: NURSE PRACTITIONER

## 2023-08-24 PROCEDURE — 11042 DBRDMT SUBQ TIS 1ST 20SQCM/<: CPT

## 2023-08-24 PROCEDURE — 11042 DBRDMT SUBQ TIS 1ST 20SQCM/<: CPT | Performed by: NURSE PRACTITIONER

## 2023-08-24 NOTE — PATIENT INSTRUCTIONS
-Keep dressings clean and dry. Change dressings every 3-4 days, and if the dressings become saturated, soiled, or fall off.    -If you need to change your dressings at home: Wash your wound with normal saline, wound cleanser, or unscented soap and water prior to applying your new dressings. Please avoid cleansing with hydrogen peroxide or rubbing alcohol.    -Avoid prolonged standing or sitting without elevating your legs.    -Remove your compression garments if you have severe pain, severe swelling, numbness, color change, or temperature change in your toes. If you need to remove your compression garments, do so by unrolling them. Do not cut the compression garments off, this is to prevent cutting yourself on accident.    -Never walk around the house barefoot.     -Should you experience any significant changes in your wounds, such as signs of infection (increasing redness, swelling, localized heat, increased pain, fever > 101 F, chills) or have any questions regarding your home care instructions, please contact the wound center at [phone]. If after hours, contact your primary care physician or go to the hospital emergency room.     -If you are 5 or more minutes late for an appointment, we reserve the right to cancel and reschedule that appointment. Additionally, if you are habitually late or not showing (3 late cancellations and/or no shows), we reserve the right to cancel your remaining appointments and it will be your responsibility to obtain a new referral if services are still needed.

## 2023-08-24 NOTE — PROGRESS NOTES
Provider Encounter- Full Thickness wound    HISTORY OF PRESENT ILLNESS  Wound History:   START OF CARE IN CLINIC: 07/27/23   REFERRING PROVIDER: Zabrina Nina   WOUND- Full Thickness Wound   LOCATION: Left Plantar foot.    HISTORY: Patient is a former alcoholic with type 2 diabetes and GERD who presented to [facility] on 7/12/2023 with a diabetic foot wound to the left plantar first MTH.  MRI showed osteomyelitis of the first metatarsal and proximal phalanx.  Dr. Melo took the patient back to surgery for an I&D with drainage of an abscess from the plantar and dorsal aspects of the left foot.  It was recommended at the time that the patient undergo amputation however patient wanted to try to salvage the toe.  Blood cultures were positive on 7/12/2023 for strep epidermidis, Corynebacterium, Pasteurella, bacterial drains, Prevotella and Enterococcus.  ID was consulted recommended 6 weeks of ertapenem guarding on 7/14.  Patient was given a knee roller scooter to offload the foot.  Patient was referred to [facility] for continued care of the left first MTH wound.    Pertinent Medical History: Portal venous hypertension, type 2 diabetes, alcoholic hepatitis, osteomyelitis of left foot    TOBACCO USE: Former smoker    Patient's problem list, allergies, and current medications reviewed and updated in Epic    Interval History:  7/27/2023: Clinic visit with ARJUN Cruz.  Patient reports that he was instructed to leave the dressing intact unless there is strikethrough.  The patient's dressing did have significant strikethrough however he did not know much drainage was truly under the bandage.  Patient has significant maceration with degradation of the wound beds due to excessive drainage.    8/24/2023 : Clinic visit with ARJUN Nelson, FNP-BC, CWOCN, CFCN.   Patient states he is feeling very well, happy with current wound progress.  He has been using knee scooter consistently.  He is still taking  Augmentin twice daily, tolerating well.  Blood sugars have been well controlled, 120 today.  He was seen by his surgeon a few weeks ago, recommendations were to continue with wound care.  If wound deteriorates or reopens after closure, may need to consider amputation.         REVIEW OF SYSTEMS:   Unchanged from previous clinic visit on 7/27/2023, except as documented in interval history above    PHYSICAL EXAMINATION:   BP (!) 143/94   Pulse 97   Temp 36.2 °C (97.1 °F) (Temporal)   Resp 18   SpO2 100%     Physical Exam  Cardiovascular:      Rate and Rhythm: Normal rate.   Pulmonary:      Effort: Pulmonary effort is normal.   Musculoskeletal:      Comments: Swelling of the left foot and hallux   Skin:     Comments: Full-thickness wounds to the plantar, medial and dorsal left hallux.  Total wound area has decreased significantly since last assessment, forefoot edema, no appreciable erythema, moderate serosanguineous drainage   Neurological:      Mental Status: He is alert and oriented to person, place, and time.       WOUND ASSESSMENT  Wound 11/09/22 Diabetic Ulcer MTH 1 Plantar Left (Active)   Wound Image    08/24/23 0920   Site Assessment Red;Yellow 08/24/23 0920   Periwound Assessment Edema 08/24/23 0920   Margins Unattached edges 08/24/23 0920   Closure Secondary intention 08/16/23 1300   Drainage Amount Moderate 08/24/23 0920   Drainage Description Serosanguineous 08/24/23 0920   Treatments Cleansed;Provider debridement 08/24/23 0920   Wound Cleansing Hypochlorus Acid 08/24/23 0920   Periwound Protectant Not Applicable 08/24/23 0920   Dressing Status Old drainage 08/16/23 1300   Dressing Changed Changed 08/24/23 0920   Dressing Cleansing/Solutions Normal Saline 08/24/23 0920   Dressing Options Collagen Dressing;Hydrofiber Silver;Dry Roll Gauze;Hypafix Tape;Tubigrip 08/24/23 0920   Dressing Change/Treatment Frequency Every 72 hrs, and As Needed 08/24/23 0920   Wound Team Following Weekly 08/24/23 0920    Non-staged Wound Description Full thickness 08/24/23 0920   Wound Length (cm) 0.6 cm 08/24/23 0920   Wound Width (cm) 1 cm 08/24/23 0920   Wound Depth (cm) 0.4 cm 08/24/23 0920   Wound Surface Area (cm^2) 0.6 cm^2 08/24/23 0920   Wound Volume (cm^3) 0.24 cm^3 08/24/23 0920   Post-Procedure Length (cm) 0.7 cm 08/24/23 0920   Post-Procedure Width (cm) 1.1 cm 08/24/23 0920   Post-Procedure Depth (cm) 0.4 cm 08/24/23 0920   Post-Procedure Surface Area (cm^2) 0.77 cm^2 08/24/23 0920   Post-Procedure Volume (cm^3) 0.308 cm^3 08/24/23 0920   Wound Healing % 94 08/24/23 0920   Wound Bed Granulation (%) - Post-Procedure 100 % 08/16/23 1300   Tunneling (cm) 0 cm 08/24/23 0920   Undermining (cm) 0 cm 08/24/23 0920   Wound Odor None 08/24/23 0920   Pulses 1+;Left;DP;PT 08/16/23 1300   Exposed Structures Adipose 08/24/23 0920   Number of days: 288       Wound 07/12/23 Incision MTH 1 Medial Left (Active)   Wound Image    08/24/23 0920   Site Assessment Red;Yellow 08/24/23 0920   Periwound Assessment Edema 08/24/23 0920   Margins Unattached edges 08/24/23 0920   Closure Secondary intention 08/16/23 1300   Drainage Amount Moderate 08/24/23 0920   Drainage Description Serosanguineous 08/24/23 0920   Treatments Cleansed;Provider debridement 08/24/23 0920   Offloading/DME Other (comment) 08/24/23 0920   Wound Cleansing Hypochlorus Acid 08/24/23 0920   Periwound Protectant Not Applicable 08/24/23 0920   Dressing Status Old drainage 08/16/23 1300   Dressing Changed Changed 08/24/23 0920   Dressing Cleansing/Solutions Normal Saline 08/24/23 0920   Dressing Options Collagen Dressing;Hydrofiber Silver;Dry Roll Gauze;Hypafix Tape;Tubigrip 08/24/23 0920   Dressing Change/Treatment Frequency Every 72 hrs, and As Needed 08/16/23 1300   Number of Sutures Removed 1 08/03/23 1400   Wound Team Following Weekly 08/24/23 0920   Non-staged Wound Description Full thickness 08/24/23 0920   Wound Length (cm) 1.5 cm 08/24/23 0920   Wound Width (cm) 0.6  cm 08/24/23 0920   Wound Depth (cm) 0.4 cm 08/24/23 0920   Wound Surface Area (cm^2) 0.9 cm^2 08/24/23 0920   Wound Volume (cm^3) 0.36 cm^3 08/24/23 0920   Post-Procedure Length (cm) 1.6 cm 08/24/23 0920   Post-Procedure Width (cm) 0.8 cm 08/24/23 0920   Post-Procedure Depth (cm) 0.4 cm 08/24/23 0920   Post-Procedure Surface Area (cm^2) 1.28 cm^2 08/24/23 0920   Post-Procedure Volume (cm^3) 0.512 cm^3 08/24/23 0920   Wound Healing % 99 08/24/23 0920   Wound Bed Granulation (%) - Post-Procedure 100 % 08/16/23 1300   Tunneling (cm) 0 cm 08/24/23 0920   Undermining (cm) 0 cm 08/24/23 0920   Wound Odor None 08/24/23 0920   Pulses Left;1+;DP;PT 08/16/23 1300   Exposed Structures Other (Comments) 08/24/23 0920   Number of days: 43       Wound 08/16/23 Diabetic Ulcer Toe, Hallux Proximal Left --Left Lateral Hallux (Active)   Wound Image   08/24/23 0920   Site Assessment Red 08/24/23 0920   Periwound Assessment Edema 08/24/23 0920   Margins Defined edges 08/24/23 0920   Drainage Amount Small 08/24/23 0920   Drainage Description Serosanguineous 08/24/23 0920   Treatments Cleansed 08/24/23 0920   Wound Cleansing Hypochlorus Acid 08/24/23 0920   Periwound Protectant Not Applicable 08/24/23 0920   Dressing Status Clean;Dry;Intact;Old drainage 08/16/23 1300   Dressing Changed New 08/24/23 0920   Dressing Cleansing/Solutions Normal Saline 08/24/23 0920   Dressing Options Collagen Dressing;Hydrofiber Silver;Dry Roll Gauze;Hypafix Tape;Tubigrip 08/24/23 0920   Dressing Change/Treatment Frequency Every 72 hrs, and As Needed 08/24/23 0920   Wound Team Following Weekly 08/24/23 0920   Non-staged Wound Description Full thickness 08/24/23 0920   Wound Length (cm) 0.2 cm 08/24/23 0920   Wound Width (cm) 0.2 cm 08/24/23 0920   Wound Depth (cm) 0.2 cm 08/24/23 0920   Wound Surface Area (cm^2) 0.04 cm^2 08/24/23 0920   Wound Volume (cm^3) 0.008 cm^3 08/24/23 0920   Post-Procedure Length (cm) 0.2 cm 08/24/23 0920   Post-Procedure Width (cm)  0.2 cm 08/24/23 0920   Post-Procedure Depth (cm) 0.2 cm 08/24/23 0920   Post-Procedure Surface Area (cm^2) 0.04 cm^2 08/24/23 0920   Post-Procedure Volume (cm^3) 0.008 cm^3 08/24/23 0920   Wound Healing % 67 08/24/23 0920   Wound Bed Granulation (%) 100 % 08/16/23 1300   Tunneling (cm) 0 cm 08/24/23 0920   Undermining (cm) 0 cm 08/24/23 0920   Wound Odor None 08/24/23 0920   Pulses 1+;Left;DP;PT 08/16/23 1300   Exposed Structures None 08/24/23 0920   Number of days: 8       PROCEDURE: Debridement first plantar MTH and left medial first MTH wounds.  -2% viscous lidocaine applied topically to wound bed for approximately 5 minutes prior to debridement  -Curette used to debride wound bed.  Excisional debridement was performed to remove devitalized tissue until healthy, bleeding tissue was visualized.   Entire surface of wound, 2.05 cm² debrided.  Tissue debrided into the subcutaneous layer.    -Bleeding controlled with manual pressure.    -Wound care completed by wound RN, refer to flowsheet  -Patient tolerated the procedure well, without c/o pain or discomfort.       Pertinent Labs and Diagnostics:    Labs:     A1c:   Lab Results   Component Value Date/Time    HBA1C 5.8 (H) 07/13/2023 12:09 AM            IMAGING:   MRI 7/13/2023  IMPRESSION:     1.  Limited study due to extensive patient motion artifact and failed fat saturation.     2.  Some marrow edema and enhancement of the first metatarsal and the first proximal phalanx likely representing osteomyelitis.     3.  Extensive cellulitis involving the great toe with extension into the mid and forefoot most prominently medially. There are areas of soft tissue ulceration seen as well.    VASCULAR STUDIES: Arterial ultrasound left lower extremity dated 2/15/2023  IMPRESSION:     No sonographic evidence of hemodynamically significant stenosis within the left leg arterial system    LAST  WOUND CULTURE:  DATE :   Lab Results   Component Value Date/Time    CULTRSULT   07/14/2023 08:01 AM     No growth after 5 days of incubation.  Blood culture testing and Gram stain, if indicated, are  performed at [facility], [address] [place] [address].[place], [place].  Positive blood cultures are  sent to [facility], [address] [place] [address], for organism identification and  susceptibility testing.      CULTRSULT  07/14/2023 08:01 AM     No growth after 5 days of incubation.  Blood culture testing and Gram stain, if indicated, are  performed at [facility], [address] [place] [address].[place], [place].  Positive blood cultures are  sent to [facility], [address] [place] [address], for organism identification and  susceptibility testing.           ASSESSMENT AND PLAN:   1. Open wound of left foot, subsequent encounter    8/24/2023: Patient's wound is currently progressing well, area is decreased significantly.  He still has forefoot swelling, no appreciable erythema.  -Excisional debridement of wound in clinic today, medically necessary to promote wound healing.   -Patient to return to clinic weekly for assessment and debridement as needed  -Patient is to change dressings 1-2 times per week in between clinic visits, or as needed for saturation or displacement  -Follow-up with surgeon as needed  -Continue to offload with knee scooter     Wound care: Collagen to accelerate granulation, silver Hydrofiber to manage exudate, dry roll gauze, Tubigrip to manage edema    2. Other chronic osteomyelitis of left foot (HCC) /Wound infection  Comments: Patient has known osteomyelitis see MRI results above.    8/24/2023: 6-week course of IV ertapenem per ID, end date 8/25/2023.  Patient's forefoot is swollen, however no appreciable erythema or induration.  Drainage is serosanguineous  -Definitive treatment would be amputation if the wound does not progress to closure  -Follow-up with ID    3.  Controlled type 2 diabetes  mellitus with complication, without long-term current use of insulin    8/24/2023: Patient's diabetes is currently well controlled, blood sugar 120 today.  On metformin only.  -Patient has been advised to keep blood sugars below 140 in order to optimize wound healing        PATIENT EDUCATION  - Importance of adequate nutrition for wound healing  -Advised to go to ER for any increased redness, swelling, drainage, or odor, or if patient develops fever, chills, nausea or vomiting.     My total time spent caring for the patient on the day of the encounter was 20 minutes.   This does not include time spent on separately billable procedures/tests.       Please note that this note may have been created using voice recognition software. I have worked with technical experts from [employer] [employer] to optimize the interface.  I have made every reasonable attempt to correct obvious errors, but there may be errors of grammar and possibly content that I did not discover before finalizing the note.    N

## 2023-08-25 ENCOUNTER — APPOINTMENT (OUTPATIENT)
Dept: ONCOLOGY | Facility: MEDICAL CENTER | Age: 33
End: 2023-08-25
Attending: INTERNAL MEDICINE

## 2023-08-31 ENCOUNTER — OFFICE VISIT (OUTPATIENT)
Dept: WOUND CARE | Facility: MEDICAL CENTER | Age: 33
End: 2023-08-31
Attending: INTERNAL MEDICINE

## 2023-08-31 VITALS
TEMPERATURE: 98.7 F | OXYGEN SATURATION: 98 % | DIASTOLIC BLOOD PRESSURE: 77 MMHG | HEART RATE: 85 BPM | RESPIRATION RATE: 18 BRPM | SYSTOLIC BLOOD PRESSURE: 129 MMHG

## 2023-08-31 DIAGNOSIS — S91.302D OPEN WOUND OF FOOT, LEFT, SUBSEQUENT ENCOUNTER: ICD-10-CM

## 2023-08-31 DIAGNOSIS — M86.172 ACUTE OSTEOMYELITIS OF LEFT FOOT (HCC): ICD-10-CM

## 2023-08-31 DIAGNOSIS — L08.9 WOUND INFECTION: ICD-10-CM

## 2023-08-31 DIAGNOSIS — E11.21 CONTROLLED TYPE 2 DIABETES MELLITUS WITH DIABETIC NEPHROPATHY, WITHOUT LONG-TERM CURRENT USE OF INSULIN (HCC): ICD-10-CM

## 2023-08-31 DIAGNOSIS — T14.8XXA WOUND INFECTION: ICD-10-CM

## 2023-08-31 PROCEDURE — 3078F DIAST BP <80 MM HG: CPT | Performed by: NURSE PRACTITIONER

## 2023-08-31 PROCEDURE — 11042 DBRDMT SUBQ TIS 1ST 20SQCM/<: CPT

## 2023-08-31 PROCEDURE — 11042 DBRDMT SUBQ TIS 1ST 20SQCM/<: CPT | Performed by: NURSE PRACTITIONER

## 2023-08-31 PROCEDURE — 3074F SYST BP LT 130 MM HG: CPT | Performed by: NURSE PRACTITIONER

## 2023-08-31 PROCEDURE — 99213 OFFICE O/P EST LOW 20 MIN: CPT | Mod: 25 | Performed by: NURSE PRACTITIONER

## 2023-08-31 PROCEDURE — 99213 OFFICE O/P EST LOW 20 MIN: CPT

## 2023-08-31 NOTE — PATIENT INSTRUCTIONS
-Keep your wound dressing clean, dry, and intact.    -Change your dressing if it becomes soiled, soaked, or falls off.    -Remove your compression wrap Left lower extremity if you have severe pain, severe swelling, numbness, color change, or temperature change in your toes. If you need to remove your compression wrap, do so by unrolling it. Do not cut the compression wrap off to prevent cutting yourself on accident.    -Should you experience any significant changes in your wound(s), such as infection (redness, swelling, localized heat, increased pain, fever > 101 F, chills) or have any questions regarding your home care instructions, please contact the wound center at [phone]. If after hours, contact your primary care physician or go to the hospital emergency room.

## 2023-09-01 NOTE — PROGRESS NOTES
Provider Encounter- Full Thickness wound    HISTORY OF PRESENT ILLNESS  Wound History:   START OF CARE IN CLINIC: 07/27/23   REFERRING PROVIDER: Zabrina Nina   WOUND- Full Thickness Wound   LOCATION: Left Plantar foot.    HISTORY: Patient is a former alcoholic with type 2 diabetes and GERD who presented to [facility] on 7/12/2023 with a diabetic foot wound to the left plantar first MTH.  MRI showed osteomyelitis of the first metatarsal and proximal phalanx.  Dr. Melo took the patient back to surgery for an I&D with drainage of an abscess from the plantar and dorsal aspects of the left foot.  It was recommended at the time that the patient undergo amputation however patient wanted to try to salvage the toe.  Blood cultures were positive on 7/12/2023 for strep epidermidis, Corynebacterium, Pasteurella, bacterial drains, Prevotella and Enterococcus.  ID was consulted recommended 6 weeks of ertapenem guarding on 7/14.  Patient was given a knee roller scooter to offload the foot.  Patient was referred to [facility] for continued care of the left first MTH wound.    Pertinent Medical History: Portal venous hypertension, type 2 diabetes, alcoholic hepatitis, osteomyelitis of left foot    TOBACCO USE: Former smoker    Patient's problem list, allergies, and current medications reviewed and updated in Epic    Interval History:  7/27/2023: Clinic visit with ARJUN Cruz.  Patient reports that he was instructed to leave the dressing intact unless there is strikethrough.  The patient's dressing did have significant strikethrough however he did not know much drainage was truly under the bandage.  Patient has significant maceration with degradation of the wound beds due to excessive drainage.    8/24/2023 : Clinic visit with ARJUN Nelson, FNP-BC, CWOCN, CFCN.   Patient states he is feeling very well, happy with current wound progress.  He has been using knee scooter consistently.  He is still taking  Augmentin twice daily, tolerating well.  Blood sugars have been well controlled, 120 today.  He was seen by his surgeon a few weeks ago, recommendations were to continue with wound care.  If wound deteriorates or reopens after closure, may need to consider amputation.    8/31/2023 : Clinic visit with ARJUN Nelson, FNP-BC, SINDHUN, CFCN.-States he is feeling very well.  Blood sugars remain in the 120s.  He continues to use his knee scooter consistently.  His wounds are slowly progressing, no evidence of infection.      REVIEW OF SYSTEMS:   Unchanged from previous clinic visit on 8/24/2023, except as documented in interval history above    PHYSICAL EXAMINATION:   /77   Pulse 85   Temp 37.1 °C (98.7 °F) (Temporal)   Resp 18   SpO2 98%     Physical Exam  Cardiovascular:      Rate and Rhythm: Normal rate.   Pulmonary:      Effort: Pulmonary effort is normal.   Musculoskeletal:      Comments: Swelling of the left foot and hallux   Skin:     Comments: Full-thickness wound to medial hallux-wound area has decreased, thin layer of slough to wound bed, moderate serosanguineous drainage, no evidence of infection  Full-thickness wound to right plantar first MTH-wound area is increased slightly, thin layer of slough to wound bed, moderate serosanguineous drainage, no evidence of infection  Left lateral hallux ulcer-resolved   Neurological:      Mental Status: He is alert and oriented to person, place, and time.         WOUND ASSESSMENT  Wound 11/09/22 Diabetic Ulcer MTH 1 Plantar Left (Active)   Wound Image    08/31/23 1600   Site Assessment Yellow;Red;Early/partial granulation 08/31/23 1600   Periwound Assessment Dry;Callused;Edema 08/31/23 1600   Margins Unattached edges 08/31/23 1600   Closure Secondary intention 08/16/23 1300   Drainage Amount Moderate 08/31/23 1600   Drainage Description Serosanguineous 08/31/23 1600   Treatments Topical Lidocaine;Cleansed;Provider debridement;Site care 08/31/23 1600   Wound  Cleansing Foam Cleanser/Washcloth 08/31/23 1600   Periwound Protectant Skin Protectant Wipes to Periwound 08/31/23 1600   Dressing Status Old drainage 08/16/23 1300   Dressing Changed Changed 08/31/23 1600   Dressing Cleansing/Solutions Normal Saline 08/31/23 1600   Dressing Options Collagen Dressing;Hydrofiber Silver;Dry Gauze;Hypafix Tape;Tubigrip 08/31/23 1600   Dressing Change/Treatment Frequency Every 72 hrs, and As Needed 08/31/23 1600   Wound Team Following Weekly 08/31/23 1600   Non-staged Wound Description Full thickness 08/31/23 1600   Wound Length (cm) 0.5 cm 08/31/23 1600   Wound Width (cm) 1 cm 08/31/23 1600   Wound Depth (cm) 0.1 cm 08/31/23 1600   Wound Surface Area (cm^2) 0.5 cm^2 08/31/23 1600   Wound Volume (cm^3) 0.05 cm^3 08/31/23 1600   Post-Procedure Length (cm) 0.8 cm 08/31/23 1600   Post-Procedure Width (cm) 1 cm 08/31/23 1600   Post-Procedure Depth (cm) 0.1 cm 08/31/23 1600   Post-Procedure Surface Area (cm^2) 0.8 cm^2 08/31/23 1600   Post-Procedure Volume (cm^3) 0.08 cm^3 08/31/23 1600   Wound Healing % 99 08/31/23 1600   Wound Bed Granulation (%) - Post-Procedure 100 % 08/16/23 1300   Tunneling (cm) 0 cm 08/31/23 1600   Undermining (cm) 0 cm 08/31/23 1600   Wound Odor None 08/31/23 1600   Pulses 1+;Left;DP;PT 08/16/23 1300   Exposed Structures Adipose 08/31/23 1600   Number of days: 295       Wound 07/12/23 Incision MTH 1 Medial Left (Active)   Wound Image    08/31/23 1600   Site Assessment Red;Yellow;Early/partial granulation 08/31/23 1600   Periwound Assessment Edema 08/31/23 1600   Margins Unattached edges 08/31/23 1600   Closure Secondary intention 08/16/23 1300   Drainage Amount Moderate 08/31/23 1600   Drainage Description Serosanguineous 08/31/23 1600   Treatments Topical Lidocaine;Cleansed;Provider debridement;Site care 08/31/23 1600   Offloading/DME Other (comment) 08/31/23 1600   Wound Cleansing Foam Cleanser/Washcloth 08/31/23 1600   Periwound Protectant Skin Protectant Wipes to  Periwound 08/31/23 1600   Dressing Status Old drainage 08/16/23 1300   Dressing Changed Changed 08/31/23 1600   Dressing Cleansing/Solutions Not Applicable 08/31/23 1600   Dressing Options Collagen Dressing;Hydrofiber Silver;Dry Roll Gauze;Tubigrip 08/31/23 1600   Dressing Change/Treatment Frequency Every 72 hrs, and As Needed 08/31/23 1600   Number of Sutures Removed 1 08/03/23 1400   Wound Team Following Weekly 08/31/23 1600   Non-staged Wound Description Full thickness 08/31/23 1600   Wound Length (cm) 1.6 cm 08/31/23 1600   Wound Width (cm) 0.6 cm 08/31/23 1600   Wound Depth (cm) 0.3 cm 08/31/23 1600   Wound Surface Area (cm^2) 0.96 cm^2 08/31/23 1600   Wound Volume (cm^3) 0.288 cm^3 08/31/23 1600   Post-Procedure Length (cm) 1.6 cm 08/31/23 1600   Post-Procedure Width (cm) 0.7 cm 08/31/23 1600   Post-Procedure Depth (cm) 0.3 cm 08/31/23 1600   Post-Procedure Surface Area (cm^2) 1.12 cm^2 08/31/23 1600   Post-Procedure Volume (cm^3) 0.336 cm^3 08/31/23 1600   Wound Healing % 99 08/31/23 1600   Wound Bed Granulation (%) - Post-Procedure 100 % 08/16/23 1300   Tunneling (cm) 0 cm 08/31/23 1600   Undermining (cm) 0 cm 08/31/23 1600   Wound Odor Mild 08/31/23 1600   Pulses Left;1+;DP;PT 08/16/23 1300   Exposed Structures Sutures 08/31/23 1600   Number of days: 50       Wound 08/16/23 Diabetic Ulcer Toe, Hallux Proximal Left --Left Lateral Hallux (Active)   Wound Image   08/31/23 1600   Site Assessment Pink;Brown;Epithelialization;Fragile 08/31/23 1600   Periwound Assessment Flaky 08/31/23 1600   Margins Defined edges 08/24/23 0920   Drainage Amount None 08/31/23 1600   Drainage Description Serosanguineous 08/24/23 0920   Treatments Cleansed 08/31/23 1600   Wound Cleansing Foam Cleanser/Washcloth 08/31/23 1600   Periwound Protectant Skin Protectant Wipes to Periwound 08/31/23 1600   Dressing Status Clean;Dry;Intact;Old drainage 08/16/23 1300   Dressing Changed New 08/24/23 0920   Dressing Cleansing/Solutions Not  Applicable 08/31/23 1600   Dressing Options Dry Gauze 08/31/23 1600   Dressing Change/Treatment Frequency Every 72 hrs, and As Needed 08/31/23 1600   Wound Team Following Weekly 08/31/23 1600   Non-staged Wound Description Full thickness 08/24/23 0920   Wound Length (cm) 0.2 cm 08/24/23 0920   Wound Width (cm) 0.2 cm 08/24/23 0920   Wound Depth (cm) 0.2 cm 08/24/23 0920   Wound Surface Area (cm^2) 0.04 cm^2 08/24/23 0920   Wound Volume (cm^3) 0.008 cm^3 08/24/23 0920   Post-Procedure Length (cm) 0.2 cm 08/24/23 0920   Post-Procedure Width (cm) 0.2 cm 08/24/23 0920   Post-Procedure Depth (cm) 0.2 cm 08/24/23 0920   Post-Procedure Surface Area (cm^2) 0.04 cm^2 08/24/23 0920   Post-Procedure Volume (cm^3) 0.008 cm^3 08/24/23 0920   Wound Healing % 67 08/24/23 0920   Wound Bed Granulation (%) 100 % 08/16/23 1300   Wound Bed Epithelium (%) 100 % 08/31/23 1600   Tunneling (cm) 0 cm 08/24/23 0920   Undermining (cm) 0 cm 08/24/23 0920   Wound Odor None 08/31/23 1600   Pulses 1+;Left;DP;PT 08/16/23 1300   Exposed Structures None 08/31/23 1600   Number of days: 15       PROCEDURE: Debridement first plantar MTH and left medial first MTH wounds.  -2% viscous lidocaine applied topically to wound bed for approximately 5 minutes prior to debridement  -Curette used to debride wound bed.  Excisional debridement was performed to remove devitalized tissue until healthy, bleeding tissue was visualized.   Entire surface of wound, 1.92 cm² debrided.  Tissue debrided into the subcutaneous layer.    -Bleeding controlled with manual pressure.    -Wound care completed by wound RN, refer to flowsheet  -Patient tolerated the procedure well, without c/o pain or discomfort.       Pertinent Labs and Diagnostics:    Labs:     A1c:   Lab Results   Component Value Date/Time    HBA1C 5.8 (H) 07/13/2023 12:09 AM            IMAGING:   MRI 7/13/2023  IMPRESSION:     1.  Limited study due to extensive patient motion artifact and failed fat saturation.      2.  Some marrow edema and enhancement of the first metatarsal and the first proximal phalanx likely representing osteomyelitis.     3.  Extensive cellulitis involving the great toe with extension into the mid and forefoot most prominently medially. There are areas of soft tissue ulceration seen as well.    VASCULAR STUDIES: Arterial ultrasound left lower extremity dated 2/15/2023  IMPRESSION:     No sonographic evidence of hemodynamically significant stenosis within the left leg arterial system    LAST  WOUND CULTURE:  DATE :   Lab Results   Component Value Date/Time    CULTRSULT  07/14/2023 08:01 AM     No growth after 5 days of incubation.  Blood culture testing and Gram stain, if indicated, are  performed at [facility], [address] [place] [address].  Positive blood cultures are  sent to [facility], [address] [place] [address], for organism identification and  susceptibility testing.      CULTRSULT  07/14/2023 08:01 AM     No growth after 5 days of incubation.  Blood culture testing and Gram stain, if indicated, are  performed at [facility], [address] [place] [address].  Positive blood cultures are  sent to [facility], [address] [place] [address], for organism identification and  susceptibility testing.           ASSESSMENT AND PLAN:   1. Open wound of left foot, subsequent encounter    8/31/2023: Patient's wounds are progressing, total wound area has decreased, medial hallux ulcer has resolved.  -Excisional debridement of wounds in clinic today, medically necessary to promote wound healing.   -Patient to return to clinic weekly for assessment and debridement as needed  -Patient is to change dressings 1-2 times per week in between clinic visits, or as needed for saturation or displacement  -Follow-up with surgeon as needed  -Continue to offload with knee scooter     Wound care: Collagen to  accelerate granulation, silver Hydrofiber to manage exudate, dry roll gauze, Tubigrip to manage edema    2. Other chronic osteomyelitis of left foot (HCC) /Wound infection  Comments: Patient has known osteomyelitis see MRI results above.    8/31/2023: 6-week course of IV ertapenem completed 8/20/2023.  No obvious signs or symptoms of infection today.   -Monitor for signs and symptoms of infection each clinic visit  -Definitive treatment would be amputation if wounds deteriorate  -Follow-up with ID    3.  Controlled type 2 diabetes mellitus with complication, without long-term current use of insulin    8/31/2023: Reports his blood sugars have been consistently in the 120s.  He is on metformin only.  -Patient has been advised to keep blood sugars below 140 in order to optimize wound healing        PATIENT EDUCATION  - Importance of adequate nutrition for wound healing  -Advised to go to ER for any increased redness, swelling, drainage, or odor, or if patient develops fever, chills, nausea or vomiting.     My total time spent caring for the patient on the day of the encounter was 20 minutes.   This does not include time spent on separately billable procedures/tests.       Please note that this note may have been created using voice recognition software. I have worked with technical experts from [employer] to optimize the interface.  I have made every reasonable attempt to correct obvious errors, but there may be errors of grammar and possibly content that I did not discover before finalizing the note.    N

## 2023-09-07 ENCOUNTER — NON-PROVIDER VISIT (OUTPATIENT)
Dept: WOUND CARE | Facility: MEDICAL CENTER | Age: 33
End: 2023-09-07
Attending: INTERNAL MEDICINE

## 2023-09-07 PROCEDURE — 97597 DBRDMT OPN WND 1ST 20 CM/<: CPT

## 2023-09-07 NOTE — PATIENT INSTRUCTIONS
-Keep your wound dressing clean, dry, and intact.    -Change your dressing if it becomes soiled, soaked, or falls off.    -Remove your compression wrap if you have severe pain, severe swelling, numbness, color change, or temperature change in your toes. If you need to remove your compression wrap, do so by unrolling it. Do not cut the compression wrap off to prevent cutting yourself on accident.    -Should you experience any significant changes in your wound(s), such as infection (redness, swelling, localized heat, increased pain, fever > 101 F, chills) or have any questions regarding your home care instructions, please contact the wound center at [phone]. If after hours, contact your primary care physician or go to the hospital emergency room.

## 2023-09-07 NOTE — PROGRESS NOTES
Changed the dressing to HFB ready to both wounds. Discontinued the collagen from the medial wound bed as it is very superficial now and it was very dry and crusted over. Patient is now using his own compression stocking. Wounds are healing exceptionally well.

## 2023-09-14 ENCOUNTER — NON-PROVIDER VISIT (OUTPATIENT)
Dept: WOUND CARE | Facility: MEDICAL CENTER | Age: 33
End: 2023-09-14
Attending: INTERNAL MEDICINE

## 2023-09-14 PROCEDURE — 97597 DBRDMT OPN WND 1ST 20 CM/<: CPT

## 2023-09-14 NOTE — PATIENT INSTRUCTIONS
-Keep your wound dressing clean, dry, and intact.    -Change your dressing if it becomes soiled, soaked, or falls off.    -Should you experience any significant changes in your wound(s), such as infection (redness, swelling, localized heat, increased pain, fever > 101 F, chills) or have any questions regarding your home care instructions, please contact the wound center at [phone]. If after hours, contact your primary care physician or go to the hospital emergency room.

## 2023-09-14 NOTE — PROCEDURES
CSWD with curette to remove <1 cm2 non viable tissue from medial wound bed only. Patient tolerated well. Wounds are nearly resolved.

## 2023-09-21 ENCOUNTER — NON-PROVIDER VISIT (OUTPATIENT)
Dept: WOUND CARE | Facility: MEDICAL CENTER | Age: 33
End: 2023-09-21
Attending: INTERNAL MEDICINE

## 2023-09-21 PROCEDURE — 99211 OFF/OP EST MAY X REQ PHY/QHP: CPT

## 2023-09-21 NOTE — PROGRESS NOTES
[facility]   [facility]   [address]   [address]   [place] [address]   [phone] Fax: [phone]    Discharge Note      Referring Physician: Zabrina Nina D.O.  Wound Etiology: DM ulcer  Wound location:Left lateral hallux                              L plantar hallux  Date of Discharge: 9/21/2023    Assessment:  Discharge patient at this time secondary to wound resolution.     Patient counseled on daily foot checks, skin hygiene, and footwear.     Thank you for the referral and the opportunity to treat your patient.

## 2023-09-21 NOTE — PATIENT INSTRUCTIONS
- Resolved wound be fragile for a few days, bathe and dry area gently, only ever regains a maximum of 80% of the tensile strength of the surrounding skin, remodeling of scar can continue for 6mo - a year. Contact PCP for a referral back her if any problems with area opening and draining again.    -Should you experience any significant changes in your wound(s), such as infection (redness, swelling, localized heat, increased pain, fever > 101 F, chills) or have any questions regarding your home care instructions, please contact the wound center at [phone]. If after hours, contact your primary care physician or go to the hospital emergency room.

## 2023-09-28 ENCOUNTER — APPOINTMENT (OUTPATIENT)
Dept: WOUND CARE | Facility: MEDICAL CENTER | Age: 33
End: 2023-09-28
Attending: INTERNAL MEDICINE

## (undated) DEVICE — TIP INTPLS HFLO ML ORFC BTRY - (12/CS)  FOR SURGILAV

## (undated) DEVICE — SWAB ANAEROBIC SPEC.COLLECTOR - (25/PK 4PK/CA 100EA/CA)

## (undated) DEVICE — COVER LIGHT HANDLE FLEXIBLE - SOFT (2EA/PK 80PK/CA)

## (undated) DEVICE — GLOVE BIOGEL SZ 8 SURGICAL PF LTX - (50PR/BX 4BX/CA)

## (undated) DEVICE — ELECTRODE DUAL RETURN W/ CORD - (50/PK)

## (undated) DEVICE — PAD PREP 24 X 48 CUFFED - (100/CA)

## (undated) DEVICE — SODIUM CHL IRRIGATION 0.9% 1000ML (12EA/CA)

## (undated) DEVICE — TOWEL STOP TIMEOUT SAFETY FLAG (40EA/CA)

## (undated) DEVICE — LACTATED RINGERS INJ 1000 ML - (14EA/CA 60CA/PF)

## (undated) DEVICE — TUBE, CULTURE AEROBIC